# Patient Record
Sex: MALE | Race: WHITE | NOT HISPANIC OR LATINO | Employment: UNEMPLOYED | URBAN - METROPOLITAN AREA
[De-identification: names, ages, dates, MRNs, and addresses within clinical notes are randomized per-mention and may not be internally consistent; named-entity substitution may affect disease eponyms.]

---

## 2017-02-22 ENCOUNTER — COMMUNICATION - HEALTHEAST (OUTPATIENT)
Dept: FAMILY MEDICINE | Facility: CLINIC | Age: 57
End: 2017-02-22

## 2017-02-27 ENCOUNTER — OFFICE VISIT - HEALTHEAST (OUTPATIENT)
Dept: FAMILY MEDICINE | Facility: CLINIC | Age: 57
End: 2017-02-27

## 2017-02-27 ENCOUNTER — AMBULATORY - HEALTHEAST (OUTPATIENT)
Dept: FAMILY MEDICINE | Facility: CLINIC | Age: 57
End: 2017-02-27

## 2017-02-27 DIAGNOSIS — Z00.00 HEALTHCARE MAINTENANCE: ICD-10-CM

## 2017-02-27 DIAGNOSIS — R80.9 PROTEINURIA: ICD-10-CM

## 2017-02-27 DIAGNOSIS — N39.41 URGE INCONTINENCE OF URINE: ICD-10-CM

## 2017-02-27 DIAGNOSIS — E78.00 HYPERCHOLESTEROLEMIA: ICD-10-CM

## 2017-02-27 DIAGNOSIS — I10 ESSENTIAL HYPERTENSION WITH GOAL BLOOD PRESSURE LESS THAN 140/90: ICD-10-CM

## 2017-02-27 LAB
ALT SERPL W P-5'-P-CCNC: 18 U/L (ref 0–45)
CHOLEST SERPL-MCNC: 186 MG/DL
FASTING STATUS PATIENT QL REPORTED: NO
HDLC SERPL-MCNC: 38 MG/DL

## 2017-02-28 ENCOUNTER — COMMUNICATION - HEALTHEAST (OUTPATIENT)
Dept: FAMILY MEDICINE | Facility: CLINIC | Age: 57
End: 2017-02-28

## 2017-03-11 ENCOUNTER — COMMUNICATION - HEALTHEAST (OUTPATIENT)
Dept: FAMILY MEDICINE | Facility: CLINIC | Age: 57
End: 2017-03-11

## 2017-10-02 ENCOUNTER — COMMUNICATION - HEALTHEAST (OUTPATIENT)
Dept: FAMILY MEDICINE | Facility: CLINIC | Age: 57
End: 2017-10-02

## 2017-10-11 ENCOUNTER — OFFICE VISIT - HEALTHEAST (OUTPATIENT)
Dept: FAMILY MEDICINE | Facility: CLINIC | Age: 57
End: 2017-10-11

## 2017-10-11 DIAGNOSIS — K21.9 ESOPHAGEAL REFLUX: ICD-10-CM

## 2017-10-11 DIAGNOSIS — I10 ESSENTIAL HYPERTENSION, MALIGNANT: ICD-10-CM

## 2017-10-11 DIAGNOSIS — Z00.00 HEALTH CARE MAINTENANCE: ICD-10-CM

## 2017-10-11 DIAGNOSIS — R05.9 COUGH: ICD-10-CM

## 2017-10-11 DIAGNOSIS — I10 HTN (HYPERTENSION): ICD-10-CM

## 2017-10-11 RX ORDER — LORATADINE 10 MG/1
10 TABLET ORAL DAILY
Qty: 30 TABLET | Refills: 11 | Status: SHIPPED | OUTPATIENT
Start: 2017-10-11 | End: 2024-04-23

## 2017-10-11 ASSESSMENT — MIFFLIN-ST. JEOR: SCORE: 1482.22

## 2017-10-12 ENCOUNTER — COMMUNICATION - HEALTHEAST (OUTPATIENT)
Dept: FAMILY MEDICINE | Facility: CLINIC | Age: 57
End: 2017-10-12

## 2018-01-12 ENCOUNTER — COMMUNICATION - HEALTHEAST (OUTPATIENT)
Dept: FAMILY MEDICINE | Facility: CLINIC | Age: 58
End: 2018-01-12

## 2018-01-15 ENCOUNTER — OFFICE VISIT - HEALTHEAST (OUTPATIENT)
Dept: FAMILY MEDICINE | Facility: CLINIC | Age: 58
End: 2018-01-15

## 2018-01-15 ENCOUNTER — COMMUNICATION - HEALTHEAST (OUTPATIENT)
Dept: FAMILY MEDICINE | Facility: CLINIC | Age: 58
End: 2018-01-15

## 2018-01-15 ENCOUNTER — RECORDS - HEALTHEAST (OUTPATIENT)
Dept: GENERAL RADIOLOGY | Facility: CLINIC | Age: 58
End: 2018-01-15

## 2018-01-15 DIAGNOSIS — R05.9 COUGH: ICD-10-CM

## 2018-01-15 DIAGNOSIS — R11.0 NAUSEA: ICD-10-CM

## 2018-01-15 DIAGNOSIS — R80.9 PROTEINURIA: ICD-10-CM

## 2018-01-15 DIAGNOSIS — I10 ESSENTIAL HYPERTENSION: ICD-10-CM

## 2018-01-15 LAB
ALT SERPL W P-5'-P-CCNC: 16 U/L (ref 0–45)
ANION GAP SERPL CALCULATED.3IONS-SCNC: 13 MMOL/L (ref 5–18)
BUN SERPL-MCNC: 17 MG/DL (ref 8–22)
CALCIUM SERPL-MCNC: 9.3 MG/DL (ref 8.5–10.5)
CHLORIDE BLD-SCNC: 98 MMOL/L (ref 98–107)
CHOLEST SERPL-MCNC: 140 MG/DL
CO2 SERPL-SCNC: 30 MMOL/L (ref 22–31)
CREAT SERPL-MCNC: 0.97 MG/DL (ref 0.7–1.3)
FASTING STATUS PATIENT QL REPORTED: NO
GFR SERPL CREATININE-BSD FRML MDRD: >60 ML/MIN/1.73M2
GLUCOSE BLD-MCNC: 87 MG/DL (ref 70–125)
HDLC SERPL-MCNC: 33 MG/DL
LDLC SERPL CALC-MCNC: 85 MG/DL
POTASSIUM BLD-SCNC: 3.7 MMOL/L (ref 3.5–5)
SODIUM SERPL-SCNC: 141 MMOL/L (ref 136–145)
TRIGL SERPL-MCNC: 110 MG/DL

## 2018-01-15 ASSESSMENT — MIFFLIN-ST. JEOR: SCORE: 1477.68

## 2018-01-15 NOTE — ASSESSMENT & PLAN NOTE
Well-controlled, on lisinopril 20, hydrochlorothiazide 25, potassium.  Check basic metabolic profile.  Also urine protein.

## 2018-01-15 NOTE — ASSESSMENT & PLAN NOTE
Protein creatinine ratio 600 last year.  Need follow-up.  Unable to leave urine today.  On lisinopril

## 2018-01-17 ENCOUNTER — COMMUNICATION - HEALTHEAST (OUTPATIENT)
Dept: FAMILY MEDICINE | Facility: CLINIC | Age: 58
End: 2018-01-17

## 2018-01-19 ENCOUNTER — COMMUNICATION - HEALTHEAST (OUTPATIENT)
Dept: FAMILY MEDICINE | Facility: CLINIC | Age: 58
End: 2018-01-19

## 2018-01-20 ENCOUNTER — COMMUNICATION - HEALTHEAST (OUTPATIENT)
Dept: FAMILY MEDICINE | Facility: CLINIC | Age: 58
End: 2018-01-20

## 2018-01-22 ENCOUNTER — COMMUNICATION - HEALTHEAST (OUTPATIENT)
Dept: FAMILY MEDICINE | Facility: CLINIC | Age: 58
End: 2018-01-22

## 2018-01-23 ENCOUNTER — COMMUNICATION - HEALTHEAST (OUTPATIENT)
Dept: FAMILY MEDICINE | Facility: CLINIC | Age: 58
End: 2018-01-23

## 2018-01-24 ENCOUNTER — COMMUNICATION - HEALTHEAST (OUTPATIENT)
Dept: FAMILY MEDICINE | Facility: CLINIC | Age: 58
End: 2018-01-24

## 2018-01-25 ENCOUNTER — COMMUNICATION - HEALTHEAST (OUTPATIENT)
Dept: FAMILY MEDICINE | Facility: CLINIC | Age: 58
End: 2018-01-25

## 2018-01-26 ENCOUNTER — COMMUNICATION - HEALTHEAST (OUTPATIENT)
Dept: FAMILY MEDICINE | Facility: CLINIC | Age: 58
End: 2018-01-26

## 2018-01-27 ENCOUNTER — COMMUNICATION - HEALTHEAST (OUTPATIENT)
Dept: FAMILY MEDICINE | Facility: CLINIC | Age: 58
End: 2018-01-27

## 2018-01-30 ENCOUNTER — COMMUNICATION - HEALTHEAST (OUTPATIENT)
Dept: FAMILY MEDICINE | Facility: CLINIC | Age: 58
End: 2018-01-30

## 2018-01-31 ENCOUNTER — COMMUNICATION - HEALTHEAST (OUTPATIENT)
Dept: FAMILY MEDICINE | Facility: CLINIC | Age: 58
End: 2018-01-31

## 2018-02-08 ENCOUNTER — OFFICE VISIT - HEALTHEAST (OUTPATIENT)
Dept: FAMILY MEDICINE | Facility: CLINIC | Age: 58
End: 2018-02-08

## 2018-02-08 DIAGNOSIS — R80.9 PROTEINURIA: ICD-10-CM

## 2018-02-08 DIAGNOSIS — R23.8 PIMPLES: ICD-10-CM

## 2018-02-08 DIAGNOSIS — I10 ESSENTIAL HYPERTENSION: ICD-10-CM

## 2018-02-08 DIAGNOSIS — R51.9 HEADACHE: ICD-10-CM

## 2018-02-08 LAB
CREAT UR-MCNC: 166 MG/DL
MICROALBUMIN UR-MCNC: 56.85 MG/DL (ref 0–1.99)
MICROALBUMIN/CREAT UR: 342.5 MG/G

## 2018-02-08 ASSESSMENT — MIFFLIN-ST. JEOR: SCORE: 1477.68

## 2018-02-08 NOTE — ASSESSMENT & PLAN NOTE
Hypertension is poorly controlled today but as below has been good coming into this and good at home  BP Readings from Last 3 Encounters:   02/08/18 142/82   01/15/18 108/62   10/11/17 136/70     Comment: We will continue to follow  Current antihypertensives lisinopril 20 mg, hydrochlorothiazide 25  Plan: Follow-up 3 months, continue current medication

## 2018-02-09 ENCOUNTER — COMMUNICATION - HEALTHEAST (OUTPATIENT)
Dept: FAMILY MEDICINE | Facility: CLINIC | Age: 58
End: 2018-02-09

## 2018-02-10 ENCOUNTER — COMMUNICATION - HEALTHEAST (OUTPATIENT)
Dept: FAMILY MEDICINE | Facility: CLINIC | Age: 58
End: 2018-02-10

## 2018-02-12 ENCOUNTER — COMMUNICATION - HEALTHEAST (OUTPATIENT)
Dept: FAMILY MEDICINE | Facility: CLINIC | Age: 58
End: 2018-02-12

## 2018-02-14 ENCOUNTER — COMMUNICATION - HEALTHEAST (OUTPATIENT)
Dept: FAMILY MEDICINE | Facility: CLINIC | Age: 58
End: 2018-02-14

## 2018-02-18 ENCOUNTER — COMMUNICATION - HEALTHEAST (OUTPATIENT)
Dept: FAMILY MEDICINE | Facility: CLINIC | Age: 58
End: 2018-02-18

## 2018-02-20 ENCOUNTER — COMMUNICATION - HEALTHEAST (OUTPATIENT)
Dept: FAMILY MEDICINE | Facility: CLINIC | Age: 58
End: 2018-02-20

## 2018-02-26 ENCOUNTER — COMMUNICATION - HEALTHEAST (OUTPATIENT)
Dept: FAMILY MEDICINE | Facility: CLINIC | Age: 58
End: 2018-02-26

## 2018-03-06 ENCOUNTER — COMMUNICATION - HEALTHEAST (OUTPATIENT)
Dept: FAMILY MEDICINE | Facility: CLINIC | Age: 58
End: 2018-03-06

## 2018-03-07 ENCOUNTER — OFFICE VISIT - HEALTHEAST (OUTPATIENT)
Dept: FAMILY MEDICINE | Facility: CLINIC | Age: 58
End: 2018-03-07

## 2018-03-07 DIAGNOSIS — R05.9 COUGH: ICD-10-CM

## 2018-03-07 DIAGNOSIS — I10 ESSENTIAL HYPERTENSION: ICD-10-CM

## 2018-03-07 DIAGNOSIS — R80.9 PROTEINURIA: ICD-10-CM

## 2018-03-07 DIAGNOSIS — R51.9 HEADACHE: ICD-10-CM

## 2018-03-07 NOTE — ASSESSMENT & PLAN NOTE
9 weeks, at times severe, no clear pattern though most symptoms consistent with muscle tension headache, no red flags.  Patient with history of cerebral palsy.    Given duration, describes severity, will obtain MRI.  If negative, referral for physical therapy for tension headache/cervicalgia

## 2018-03-07 NOTE — ASSESSMENT & PLAN NOTE
Well-controlled but has developed a subacute cough, will stop lisinopril, start losartan, follow blood pressures.

## 2018-03-07 NOTE — ASSESSMENT & PLAN NOTE
Subacute/chronic at this point  No clear cause, began without upper respiratory infection  Normal chest x-ray, some wheezing with coughing  Not respond to treatment with omeprazole, prednisone, cephalexin (used for another cause and )  No symptoms GERD, no upper respiratory symptoms whatsoever  We will stop lisinopril despite the fact that he took it for a long time without any significant cough.  Obtain PFTs looking for obstructive lung disease/asthma  Follow-up with these results

## 2018-03-09 ENCOUNTER — COMMUNICATION - HEALTHEAST (OUTPATIENT)
Dept: FAMILY MEDICINE | Facility: CLINIC | Age: 58
End: 2018-03-09

## 2018-03-12 ENCOUNTER — COMMUNICATION - HEALTHEAST (OUTPATIENT)
Dept: FAMILY MEDICINE | Facility: CLINIC | Age: 58
End: 2018-03-12

## 2018-03-13 ENCOUNTER — COMMUNICATION - HEALTHEAST (OUTPATIENT)
Dept: FAMILY MEDICINE | Facility: CLINIC | Age: 58
End: 2018-03-13

## 2018-03-13 ENCOUNTER — HOSPITAL ENCOUNTER (OUTPATIENT)
Dept: MRI IMAGING | Facility: CLINIC | Age: 58
Discharge: HOME | End: 2018-03-13
Attending: FAMILY MEDICINE

## 2018-03-13 DIAGNOSIS — R51.9 HEADACHE: ICD-10-CM

## 2018-03-13 LAB
CREAT BLD-MCNC: 1 MG/DL
CREAT BLD-MCNC: 1 MG/DL (ref 0.7–1.3)
POC GFR AMER AF HE - HISTORICAL: >60 ML/MIN/1.73M2
POC GFR NON AMER AF HE - HISTORICAL: >60 ML/MIN/1.73M2

## 2018-03-14 ENCOUNTER — AMBULATORY - HEALTHEAST (OUTPATIENT)
Dept: FAMILY MEDICINE | Facility: CLINIC | Age: 58
End: 2018-03-14

## 2018-03-14 ENCOUNTER — COMMUNICATION - HEALTHEAST (OUTPATIENT)
Dept: FAMILY MEDICINE | Facility: CLINIC | Age: 58
End: 2018-03-14

## 2018-03-14 DIAGNOSIS — G44.209 MUSCLE TENSION HEADACHE: ICD-10-CM

## 2018-03-23 ENCOUNTER — OFFICE VISIT - HEALTHEAST (OUTPATIENT)
Dept: PHYSICAL THERAPY | Facility: REHABILITATION | Age: 58
End: 2018-03-23

## 2018-03-23 DIAGNOSIS — R29.3 ABNORMAL POSTURE: ICD-10-CM

## 2018-03-23 DIAGNOSIS — G44.209 TENSION HEADACHE: ICD-10-CM

## 2018-03-23 DIAGNOSIS — R29.898 DECREASED ROM OF NECK: ICD-10-CM

## 2018-03-27 ENCOUNTER — OFFICE VISIT - HEALTHEAST (OUTPATIENT)
Dept: PHYSICAL THERAPY | Facility: REHABILITATION | Age: 58
End: 2018-03-27

## 2018-03-27 DIAGNOSIS — R29.3 ABNORMAL POSTURE: ICD-10-CM

## 2018-03-27 DIAGNOSIS — G44.209 TENSION HEADACHE: ICD-10-CM

## 2018-03-27 DIAGNOSIS — R29.898 DECREASED ROM OF NECK: ICD-10-CM

## 2018-03-30 ENCOUNTER — OFFICE VISIT - HEALTHEAST (OUTPATIENT)
Dept: PHYSICAL THERAPY | Facility: REHABILITATION | Age: 58
End: 2018-03-30

## 2018-03-30 ENCOUNTER — COMMUNICATION - HEALTHEAST (OUTPATIENT)
Dept: FAMILY MEDICINE | Facility: CLINIC | Age: 58
End: 2018-03-30

## 2018-03-30 DIAGNOSIS — R29.3 ABNORMAL POSTURE: ICD-10-CM

## 2018-03-30 DIAGNOSIS — R29.898 DECREASED ROM OF NECK: ICD-10-CM

## 2018-03-30 DIAGNOSIS — G44.209 TENSION HEADACHE: ICD-10-CM

## 2018-04-17 ENCOUNTER — OFFICE VISIT - HEALTHEAST (OUTPATIENT)
Dept: PHYSICAL THERAPY | Facility: REHABILITATION | Age: 58
End: 2018-04-17

## 2018-04-17 DIAGNOSIS — R29.898 DECREASED ROM OF NECK: ICD-10-CM

## 2018-04-17 DIAGNOSIS — G44.209 TENSION HEADACHE: ICD-10-CM

## 2018-04-17 DIAGNOSIS — R29.3 ABNORMAL POSTURE: ICD-10-CM

## 2018-08-05 ENCOUNTER — COMMUNICATION - HEALTHEAST (OUTPATIENT)
Dept: FAMILY MEDICINE | Facility: CLINIC | Age: 58
End: 2018-08-05

## 2018-08-13 ENCOUNTER — COMMUNICATION - HEALTHEAST (OUTPATIENT)
Dept: FAMILY MEDICINE | Facility: CLINIC | Age: 58
End: 2018-08-13

## 2018-08-15 ENCOUNTER — OFFICE VISIT - HEALTHEAST (OUTPATIENT)
Dept: FAMILY MEDICINE | Facility: CLINIC | Age: 58
End: 2018-08-15

## 2018-08-15 ENCOUNTER — COMMUNICATION - HEALTHEAST (OUTPATIENT)
Dept: FAMILY MEDICINE | Facility: CLINIC | Age: 58
End: 2018-08-15

## 2018-08-15 DIAGNOSIS — N30.00 ACUTE CYSTITIS WITHOUT HEMATURIA: ICD-10-CM

## 2018-08-15 DIAGNOSIS — I10 ESSENTIAL HYPERTENSION: ICD-10-CM

## 2018-08-15 DIAGNOSIS — R35.0 URINARY FREQUENCY: ICD-10-CM

## 2018-08-15 LAB
ALBUMIN UR-MCNC: ABNORMAL MG/DL
APPEARANCE UR: ABNORMAL
BILIRUB UR QL STRIP: NEGATIVE
COLOR UR AUTO: YELLOW
GLUCOSE UR STRIP-MCNC: NEGATIVE MG/DL
HGB UR QL STRIP: ABNORMAL
KETONES UR STRIP-MCNC: NEGATIVE MG/DL
LEUKOCYTE ESTERASE UR QL STRIP: ABNORMAL
NITRATE UR QL: POSITIVE
PH UR STRIP: 5.5 [PH] (ref 5–8)
SP GR UR STRIP: 1.02 (ref 1–1.03)
UROBILINOGEN UR STRIP-ACNC: ABNORMAL

## 2018-08-15 NOTE — ASSESSMENT & PLAN NOTE
Ports condition pre-existing before recent UTI.  Asked him to follow-up when he comes in for hypertension, discussion regarding possible treatment of BPH.

## 2018-08-15 NOTE — ASSESSMENT & PLAN NOTE
, Unclear whether he is taking losartan in addition to hydrochlorothiazide.  Asked him to check on this, also, check blood pressures a few times a week over the next 3-4 weeks and follow-up withhis machine in hand

## 2018-08-17 LAB — BACTERIA SPEC CULT: ABNORMAL

## 2018-11-06 ENCOUNTER — COMMUNICATION - HEALTHEAST (OUTPATIENT)
Dept: FAMILY MEDICINE | Facility: CLINIC | Age: 58
End: 2018-11-06

## 2019-01-17 ENCOUNTER — COMMUNICATION - HEALTHEAST (OUTPATIENT)
Dept: FAMILY MEDICINE | Facility: CLINIC | Age: 59
End: 2019-01-17

## 2019-05-29 ENCOUNTER — COMMUNICATION - HEALTHEAST (OUTPATIENT)
Dept: FAMILY MEDICINE | Facility: CLINIC | Age: 59
End: 2019-05-29

## 2019-06-18 ENCOUNTER — OFFICE VISIT - HEALTHEAST (OUTPATIENT)
Dept: FAMILY MEDICINE | Facility: CLINIC | Age: 59
End: 2019-06-18

## 2019-06-18 ENCOUNTER — COMMUNICATION - HEALTHEAST (OUTPATIENT)
Dept: FAMILY MEDICINE | Facility: CLINIC | Age: 59
End: 2019-06-18

## 2019-06-18 DIAGNOSIS — N40.1 BENIGN PROSTATIC HYPERPLASIA WITH LOWER URINARY TRACT SYMPTOMS, SYMPTOM DETAILS UNSPECIFIED: ICD-10-CM

## 2019-06-18 DIAGNOSIS — I10 ESSENTIAL HYPERTENSION: ICD-10-CM

## 2019-06-18 DIAGNOSIS — I51.7 CARDIOMEGALY: ICD-10-CM

## 2019-06-18 DIAGNOSIS — R80.9 PROTEINURIA, UNSPECIFIED TYPE: ICD-10-CM

## 2019-06-18 DIAGNOSIS — E78.00 HYPERCHOLESTEROLEMIA: ICD-10-CM

## 2019-06-18 DIAGNOSIS — Z00.00 HEALTHCARE MAINTENANCE: ICD-10-CM

## 2019-06-18 LAB
ALBUMIN UR-MCNC: ABNORMAL MG/DL
ALT SERPL W P-5'-P-CCNC: 14 U/L (ref 0–45)
ANION GAP SERPL CALCULATED.3IONS-SCNC: 7 MMOL/L (ref 5–18)
APPEARANCE UR: CLEAR
BACTERIA #/AREA URNS HPF: ABNORMAL HPF
BILIRUB UR QL STRIP: NEGATIVE
BUN SERPL-MCNC: 20 MG/DL (ref 8–22)
CALCIUM SERPL-MCNC: 9.8 MG/DL (ref 8.5–10.5)
CHLORIDE BLD-SCNC: 105 MMOL/L (ref 98–107)
CHOLEST SERPL-MCNC: 210 MG/DL
CO2 SERPL-SCNC: 31 MMOL/L (ref 22–31)
COLOR UR AUTO: YELLOW
CREAT SERPL-MCNC: 1.01 MG/DL (ref 0.7–1.3)
CREAT UR-MCNC: 165.5 MG/DL
FASTING STATUS PATIENT QL REPORTED: YES
GFR SERPL CREATININE-BSD FRML MDRD: >60 ML/MIN/1.73M2
GLUCOSE BLD-MCNC: 77 MG/DL (ref 70–125)
GLUCOSE UR STRIP-MCNC: NEGATIVE MG/DL
HDLC SERPL-MCNC: 41 MG/DL
HGB UR QL STRIP: ABNORMAL
KETONES UR STRIP-MCNC: NEGATIVE MG/DL
LEUKOCYTE ESTERASE UR QL STRIP: NEGATIVE
MICROALBUMIN UR-MCNC: 61.76 MG/DL (ref 0–1.99)
MICROALBUMIN/CREAT UR: 373.2 MG/G
MUCOUS THREADS #/AREA URNS LPF: ABNORMAL LPF
NITRATE UR QL: NEGATIVE
PH UR STRIP: 5.5 [PH] (ref 5–8)
POTASSIUM BLD-SCNC: 3.7 MMOL/L (ref 3.5–5)
RBC #/AREA URNS AUTO: ABNORMAL HPF
SODIUM SERPL-SCNC: 143 MMOL/L (ref 136–145)
SP GR UR STRIP: >=1.03 (ref 1–1.03)
SQUAMOUS #/AREA URNS AUTO: ABNORMAL LPF
UROBILINOGEN UR STRIP-ACNC: ABNORMAL
WBC #/AREA URNS AUTO: ABNORMAL HPF

## 2019-06-18 ASSESSMENT — MIFFLIN-ST. JEOR: SCORE: 1451.03

## 2019-06-18 NOTE — ASSESSMENT & PLAN NOTE
Symptoms of urinary frequency, and urgency moderate to severe AUA, enlarged prostate.  Check UA, start tamsulosin, follow-up 1 month

## 2019-06-18 NOTE — ASSESSMENT & PLAN NOTE
Patient had stopped all medications.  Restart losartan 50, hold on restarting hydrochlorothiazide because of urinary frequency issues.Base metabolic profile and microalbumin ordered, history of mild proteinuria.  Follow-up 1 month

## 2019-06-19 ENCOUNTER — AMBULATORY - HEALTHEAST (OUTPATIENT)
Dept: FAMILY MEDICINE | Facility: CLINIC | Age: 59
End: 2019-06-19

## 2019-06-19 ENCOUNTER — COMMUNICATION - HEALTHEAST (OUTPATIENT)
Dept: FAMILY MEDICINE | Facility: CLINIC | Age: 59
End: 2019-06-19

## 2019-06-19 DIAGNOSIS — E78.00 HYPERCHOLESTEROLEMIA: ICD-10-CM

## 2019-06-20 ENCOUNTER — COMMUNICATION - HEALTHEAST (OUTPATIENT)
Dept: FAMILY MEDICINE | Facility: CLINIC | Age: 59
End: 2019-06-20

## 2019-07-18 ENCOUNTER — OFFICE VISIT - HEALTHEAST (OUTPATIENT)
Dept: FAMILY MEDICINE | Facility: CLINIC | Age: 59
End: 2019-07-18

## 2019-07-18 DIAGNOSIS — Z11.4 ENCOUNTER FOR SCREENING FOR HIV: ICD-10-CM

## 2019-07-18 DIAGNOSIS — I51.7 CARDIOMEGALY: ICD-10-CM

## 2019-07-18 DIAGNOSIS — R53.1 WEAKNESS: ICD-10-CM

## 2019-07-18 DIAGNOSIS — I10 ESSENTIAL HYPERTENSION: ICD-10-CM

## 2019-07-18 DIAGNOSIS — R80.9 PROTEINURIA, UNSPECIFIED TYPE: ICD-10-CM

## 2019-07-18 DIAGNOSIS — R40.0 DAYTIME SLEEPINESS: ICD-10-CM

## 2019-07-18 DIAGNOSIS — N40.1 BENIGN PROSTATIC HYPERPLASIA WITH LOWER URINARY TRACT SYMPTOMS, SYMPTOM DETAILS UNSPECIFIED: ICD-10-CM

## 2019-07-18 LAB
ALBUMIN SERPL-MCNC: 3.8 G/DL (ref 3.5–5)
ALP SERPL-CCNC: 71 U/L (ref 45–120)
ALT SERPL W P-5'-P-CCNC: 12 U/L (ref 0–45)
ANION GAP SERPL CALCULATED.3IONS-SCNC: 7 MMOL/L (ref 5–18)
AST SERPL W P-5'-P-CCNC: 13 U/L (ref 0–40)
BILIRUB SERPL-MCNC: 0.7 MG/DL (ref 0–1)
BUN SERPL-MCNC: 25 MG/DL (ref 8–22)
CALCIUM SERPL-MCNC: 9.6 MG/DL (ref 8.5–10.5)
CHLORIDE BLD-SCNC: 106 MMOL/L (ref 98–107)
CO2 SERPL-SCNC: 28 MMOL/L (ref 22–31)
CREAT SERPL-MCNC: 0.94 MG/DL (ref 0.7–1.3)
ERYTHROCYTE [SEDIMENTATION RATE] IN BLOOD BY WESTERGREN METHOD: 2 MM/HR (ref 0–15)
GFR SERPL CREATININE-BSD FRML MDRD: >60 ML/MIN/1.73M2
GLUCOSE BLD-MCNC: 94 MG/DL (ref 70–125)
HIV 1+2 AB+HIV1 P24 AG SERPL QL IA: NEGATIVE
POTASSIUM BLD-SCNC: 4.3 MMOL/L (ref 3.5–5)
PROT SERPL-MCNC: 7.1 G/DL (ref 6–8)
SODIUM SERPL-SCNC: 141 MMOL/L (ref 136–145)
URATE SERPL-MCNC: 5.3 MG/DL (ref 3–8)

## 2019-07-18 NOTE — ASSESSMENT & PLAN NOTE
Modest improvement in symptoms with tamsulosin but patient is complaining of daytime sleepiness and fairly convinced that is medication related.  Will hold this for 2 weeks, restart if it does not seem related to the sleepiness

## 2019-07-18 NOTE — ASSESSMENT & PLAN NOTE
Hypertension is well controlled;    BP Readings from Last 3 Encounters:   07/18/19 138/82   06/25/19 134/61   06/18/19 (!) 162/100       Current antihypertensives losartan 50 mg  Patient has long-standing proteinuria, on the order of 300 mg/day semiquantitative estimate  Plan: No change in blood pressure medicine, work-up proteinuria    for orders placed or performed during the hospital encounter of 06/25/19   Basic Metabolic Panel   Result Value Ref Range    Sodium 141 136 - 145 mmol/L    Potassium 3.4 (L) 3.5 -5.0 mmol/L    Chloride 104 98 - 107 mmol/L    CO2 23 22 - 31 mmol/L    Anion Gap, Calculation 14 5 - 18 mmol/L    Glucose 199 (H) 70 - 125 mg/dL    Calcium 9.4 8.5 - 10.5 mg/dL    BUN 16 8 - 22 mg/dL    Creatinine 1.37 (H) 0.70 - 1.30mg/dL    GFR MDRD Af Amer >60 >60 mL/min/1.73m2    GFR MDRD Non Af Amer 53 (L) >60 mL/min/1.73m2

## 2019-07-18 NOTE — ASSESSMENT & PLAN NOTE
Long-standing.  Reviewed chart and it appears that the first inkling of some proteinuria occurred on urinalysis in 2015.  Had a high level in 2017 and this is gone down somewhat.  Now on the order of 300 mg/dL; to take it if.  Has not been worked up.  Given LVH, suspect this may be related to long-standing untreated hypertension from the past but no proof of that.    Will do serology work-up, check 24-hour urine protein, hold all ibuprofen, reassess, consider referral to nephrology

## 2019-07-18 NOTE — ASSESSMENT & PLAN NOTE
Recently started 3 medications which he believes are the culprit.  Losartan is unlikely to be a problem, atorvastatin unlikely but possible, tamsulosin possible.  Will hold the last 2 for 2 weeks, see if symptoms improve.  If not improving, consider sleep study if patient does not sleep with anybody and is not sure if he snores

## 2019-07-19 ENCOUNTER — COMMUNICATION - HEALTHEAST (OUTPATIENT)
Dept: FAMILY MEDICINE | Facility: CLINIC | Age: 59
End: 2019-07-19

## 2019-07-19 DIAGNOSIS — R80.9 PROTEINURIA, UNSPECIFIED TYPE: ICD-10-CM

## 2019-07-19 LAB
ALBUMIN PERCENT: 55.4 % (ref 51–67)
ALBUMIN SERPL ELPH-MCNC: 3.8 G/DL (ref 3.2–4.7)
ALPHA 1 PERCENT: 3 % (ref 2–4)
ALPHA 2 PERCENT: 10.8 % (ref 5–13)
ALPHA1 GLOB SERPL ELPH-MCNC: 0.2 G/DL (ref 0.1–0.3)
ALPHA2 GLOB SERPL ELPH-MCNC: 0.7 G/DL (ref 0.4–0.9)
B-GLOBULIN SERPL ELPH-MCNC: 0.7 G/DL (ref 0.7–1.2)
BETA PERCENT: 10.1 % (ref 10–17)
GAMMA GLOB SERPL ELPH-MCNC: 1.4 G/DL (ref 0.6–1.4)
GAMMA GLOBULIN PERCENT: 20.7 % (ref 9–20)
HAV IGM SERPL QL IA: NEGATIVE
HBV CORE IGM SERPL QL IA: NEGATIVE
HBV SURFACE AG SERPL QL IA: NEGATIVE
HCV AB SERPL QL IA: NEGATIVE
PATH ICD:: ABNORMAL
PROT PATTERN SERPL ELPH-IMP: ABNORMAL
PROT SERPL-MCNC: 6.9 G/DL (ref 6–8)
REVIEWING PATHOLOGIST: ABNORMAL
T PALLIDUM AB SER QL: NEGATIVE

## 2019-08-15 ENCOUNTER — OFFICE VISIT - HEALTHEAST (OUTPATIENT)
Dept: FAMILY MEDICINE | Facility: CLINIC | Age: 59
End: 2019-08-15

## 2019-08-15 DIAGNOSIS — I10 ESSENTIAL HYPERTENSION: ICD-10-CM

## 2019-08-15 DIAGNOSIS — N40.1 BENIGN PROSTATIC HYPERPLASIA WITH LOWER URINARY TRACT SYMPTOMS, SYMPTOM DETAILS UNSPECIFIED: ICD-10-CM

## 2019-08-15 DIAGNOSIS — E78.00 HYPERCHOLESTEROLEMIA: ICD-10-CM

## 2019-08-15 DIAGNOSIS — I51.7 CARDIOMEGALY: ICD-10-CM

## 2019-08-15 LAB
PROTEIN TOTAL TIMED URINE MG/SPEC LHE: 306 MG/24HR (ref 0–150)
PROTEIN, RANDOM URINE - HISTORICAL: 18 MG/DL
SPECIMEN VOL UR: 1700 ML

## 2019-09-05 ENCOUNTER — AMBULATORY - HEALTHEAST (OUTPATIENT)
Dept: FAMILY MEDICINE | Facility: CLINIC | Age: 59
End: 2019-09-05

## 2019-09-05 ENCOUNTER — AMBULATORY - HEALTHEAST (OUTPATIENT)
Dept: NURSING | Facility: CLINIC | Age: 59
End: 2019-09-05

## 2019-09-05 ENCOUNTER — COMMUNICATION - HEALTHEAST (OUTPATIENT)
Dept: FAMILY MEDICINE | Facility: CLINIC | Age: 59
End: 2019-09-05

## 2019-09-05 DIAGNOSIS — E78.00 HYPERCHOLESTEROLEMIA: ICD-10-CM

## 2019-11-15 ENCOUNTER — COMMUNICATION - HEALTHEAST (OUTPATIENT)
Dept: FAMILY MEDICINE | Facility: CLINIC | Age: 59
End: 2019-11-15

## 2019-11-26 ENCOUNTER — COMMUNICATION - HEALTHEAST (OUTPATIENT)
Dept: FAMILY MEDICINE | Facility: CLINIC | Age: 59
End: 2019-11-26

## 2020-02-22 ENCOUNTER — COMMUNICATION - HEALTHEAST (OUTPATIENT)
Dept: FAMILY MEDICINE | Facility: CLINIC | Age: 60
End: 2020-02-22

## 2020-04-21 ENCOUNTER — COMMUNICATION - HEALTHEAST (OUTPATIENT)
Dept: FAMILY MEDICINE | Facility: CLINIC | Age: 60
End: 2020-04-21

## 2020-05-08 ENCOUNTER — COMMUNICATION - HEALTHEAST (OUTPATIENT)
Dept: FAMILY MEDICINE | Facility: CLINIC | Age: 60
End: 2020-05-08

## 2020-05-18 ENCOUNTER — AMBULATORY - HEALTHEAST (OUTPATIENT)
Dept: FAMILY MEDICINE | Facility: CLINIC | Age: 60
End: 2020-05-18

## 2020-05-18 DIAGNOSIS — R05.9 COUGH: ICD-10-CM

## 2020-05-19 ENCOUNTER — COMMUNICATION - HEALTHEAST (OUTPATIENT)
Dept: FAMILY MEDICINE | Facility: CLINIC | Age: 60
End: 2020-05-19

## 2020-05-20 ENCOUNTER — OFFICE VISIT - HEALTHEAST (OUTPATIENT)
Dept: INTERNAL MEDICINE | Facility: CLINIC | Age: 60
End: 2020-05-20

## 2020-05-20 DIAGNOSIS — R05.9 COUGH: ICD-10-CM

## 2020-05-21 ENCOUNTER — COMMUNICATION - HEALTHEAST (OUTPATIENT)
Dept: INTERNAL MEDICINE | Facility: CLINIC | Age: 60
End: 2020-05-21

## 2020-05-23 ENCOUNTER — COMMUNICATION - HEALTHEAST (OUTPATIENT)
Dept: FAMILY MEDICINE | Facility: CLINIC | Age: 60
End: 2020-05-23

## 2020-07-03 ENCOUNTER — COMMUNICATION - HEALTHEAST (OUTPATIENT)
Dept: FAMILY MEDICINE | Facility: CLINIC | Age: 60
End: 2020-07-03

## 2020-07-07 ENCOUNTER — OFFICE VISIT - HEALTHEAST (OUTPATIENT)
Dept: FAMILY MEDICINE | Facility: CLINIC | Age: 60
End: 2020-07-07

## 2020-07-07 DIAGNOSIS — N18.30 CHRONIC KIDNEY DISEASE, STAGE 3 (MODERATE): ICD-10-CM

## 2020-07-07 DIAGNOSIS — M16.11 OSTEOARTHRITIS OF RIGHT HIP, UNSPECIFIED OSTEOARTHRITIS TYPE: ICD-10-CM

## 2020-07-07 DIAGNOSIS — I10 ESSENTIAL HYPERTENSION: ICD-10-CM

## 2020-07-07 DIAGNOSIS — R73.9 HYPERGLYCEMIA: ICD-10-CM

## 2020-07-07 DIAGNOSIS — E87.6 HYPOKALEMIA: ICD-10-CM

## 2020-07-07 DIAGNOSIS — R79.89 ELEVATED SERUM CREATININE: ICD-10-CM

## 2020-07-07 ASSESSMENT — PATIENT HEALTH QUESTIONNAIRE - PHQ9: SUM OF ALL RESPONSES TO PHQ QUESTIONS 1-9: 0

## 2020-07-08 ENCOUNTER — COMMUNICATION - HEALTHEAST (OUTPATIENT)
Dept: FAMILY MEDICINE | Facility: CLINIC | Age: 60
End: 2020-07-08

## 2020-07-22 ENCOUNTER — COMMUNICATION - HEALTHEAST (OUTPATIENT)
Dept: FAMILY MEDICINE | Facility: CLINIC | Age: 60
End: 2020-07-22

## 2020-07-22 DIAGNOSIS — N40.1 BENIGN PROSTATIC HYPERPLASIA WITH LOWER URINARY TRACT SYMPTOMS, SYMPTOM DETAILS UNSPECIFIED: ICD-10-CM

## 2020-07-23 ENCOUNTER — AMBULATORY - HEALTHEAST (OUTPATIENT)
Dept: FAMILY MEDICINE | Facility: CLINIC | Age: 60
End: 2020-07-23

## 2020-07-23 DIAGNOSIS — I10 ESSENTIAL HYPERTENSION, MALIGNANT: ICD-10-CM

## 2020-07-23 RX ORDER — TAMSULOSIN HYDROCHLORIDE 0.4 MG/1
CAPSULE ORAL
Qty: 90 CAPSULE | Refills: 3 | Status: SHIPPED | OUTPATIENT
Start: 2020-07-23 | End: 2021-10-04

## 2020-08-28 ENCOUNTER — COMMUNICATION - HEALTHEAST (OUTPATIENT)
Dept: FAMILY MEDICINE | Facility: CLINIC | Age: 60
End: 2020-08-28

## 2020-08-28 DIAGNOSIS — E78.00 HYPERCHOLESTEROLEMIA: ICD-10-CM

## 2020-08-28 RX ORDER — ATORVASTATIN CALCIUM 20 MG/1
20 TABLET, FILM COATED ORAL AT BEDTIME
Qty: 90 TABLET | Refills: 3 | Status: SHIPPED | OUTPATIENT
Start: 2020-08-28 | End: 2024-03-03

## 2020-12-13 ENCOUNTER — COMMUNICATION - HEALTHEAST (OUTPATIENT)
Dept: FAMILY MEDICINE | Facility: CLINIC | Age: 60
End: 2020-12-13

## 2020-12-30 ENCOUNTER — COMMUNICATION - HEALTHEAST (OUTPATIENT)
Dept: FAMILY MEDICINE | Facility: CLINIC | Age: 60
End: 2020-12-30

## 2020-12-30 ENCOUNTER — OFFICE VISIT - HEALTHEAST (OUTPATIENT)
Dept: FAMILY MEDICINE | Facility: CLINIC | Age: 60
End: 2020-12-30

## 2020-12-30 DIAGNOSIS — R60.0 LOWER EXTREMITY EDEMA: ICD-10-CM

## 2020-12-30 DIAGNOSIS — M54.50 ACUTE BILATERAL LOW BACK PAIN WITHOUT SCIATICA: ICD-10-CM

## 2020-12-30 DIAGNOSIS — I10 ESSENTIAL HYPERTENSION: ICD-10-CM

## 2020-12-30 DIAGNOSIS — R59.0 CERVICAL LYMPHADENOPATHY: ICD-10-CM

## 2020-12-30 DIAGNOSIS — N18.31 STAGE 3A CHRONIC KIDNEY DISEASE (H): ICD-10-CM

## 2020-12-30 DIAGNOSIS — R73.9 HYPERGLYCEMIA: ICD-10-CM

## 2020-12-30 DIAGNOSIS — E78.00 HYPERCHOLESTEROLEMIA: ICD-10-CM

## 2020-12-30 DIAGNOSIS — R80.9 PROTEINURIA, UNSPECIFIED TYPE: ICD-10-CM

## 2020-12-30 LAB
ANION GAP SERPL CALCULATED.3IONS-SCNC: 11 MMOL/L (ref 5–18)
BUN SERPL-MCNC: 24 MG/DL (ref 8–22)
CALCIUM SERPL-MCNC: 9 MG/DL (ref 8.5–10.5)
CHLORIDE BLD-SCNC: 105 MMOL/L (ref 98–107)
CO2 SERPL-SCNC: 27 MMOL/L (ref 22–31)
CREAT SERPL-MCNC: 0.99 MG/DL (ref 0.7–1.3)
GFR SERPL CREATININE-BSD FRML MDRD: >60 ML/MIN/1.73M2
GLUCOSE BLD-MCNC: 121 MG/DL (ref 70–125)
HBA1C MFR BLD: 5.4 %
HGB BLD-MCNC: 15.2 G/DL (ref 14–18)
LDLC SERPL CALC-MCNC: 139 MG/DL
POTASSIUM BLD-SCNC: 4.2 MMOL/L (ref 3.5–5)
SODIUM SERPL-SCNC: 143 MMOL/L (ref 136–145)

## 2020-12-30 RX ORDER — CYCLOBENZAPRINE HCL 5 MG
5 TABLET ORAL 3 TIMES DAILY PRN
Qty: 30 TABLET | Refills: 3 | Status: SHIPPED | OUTPATIENT
Start: 2020-12-30 | End: 2023-12-05

## 2020-12-30 ASSESSMENT — MIFFLIN-ST. JEOR: SCORE: 1489.02

## 2020-12-30 NOTE — ASSESSMENT & PLAN NOTE
Recheck UA/microalbumin upon follow-up.  Possibly related to swelling.  Increase losartan dose to treat blood pressure

## 2020-12-30 NOTE — ASSESSMENT & PLAN NOTE
BMP, hemoglobin ordered, vitamin D check rejected/not covered even for chronic kidney disease.  Willforego that for now.  Losartan.  Check microalbumin upon follow-up

## 2020-12-30 NOTE — ASSESSMENT & PLAN NOTE
No red flags but patient exquisitely tenderness with a lot of spasm.  Had plan to do x-ray today but x-ray closed by the time this wasdone.  Consider upon follow-up for hypertension.  Tylenol, physical therapy, muscle relaxer.

## 2020-12-30 NOTE — ASSESSMENT & PLAN NOTE
Mobile, smooth, less than 1 cm, not pathologic, 3 weeks duration.  Follow 3-4 more weeks for resolution.  Work-up if not resolving upon follow-up

## 2020-12-30 NOTE — ASSESSMENT & PLAN NOTE
Etiology unclear, check BMP.  Recommend recheck urine but unable to urinate.  I do not want to add a diuretic at this point because ofhistory of hypokalemia.  Will follow, treat elevated blood pressure, follow-up 3 weeks for elevated blood pressure, check UA at that time patient does have a history of albuminuria

## 2020-12-31 ENCOUNTER — COMMUNICATION - HEALTHEAST (OUTPATIENT)
Dept: FAMILY MEDICINE | Facility: CLINIC | Age: 60
End: 2020-12-31

## 2021-01-18 ENCOUNTER — COMMUNICATION - HEALTHEAST (OUTPATIENT)
Dept: FAMILY MEDICINE | Facility: CLINIC | Age: 61
End: 2021-01-18

## 2021-01-27 ENCOUNTER — OFFICE VISIT - HEALTHEAST (OUTPATIENT)
Dept: FAMILY MEDICINE | Facility: CLINIC | Age: 61
End: 2021-01-27

## 2021-01-27 ENCOUNTER — COMMUNICATION - HEALTHEAST (OUTPATIENT)
Dept: FAMILY MEDICINE | Facility: CLINIC | Age: 61
End: 2021-01-27

## 2021-01-27 DIAGNOSIS — I10 ESSENTIAL HYPERTENSION: ICD-10-CM

## 2021-01-27 DIAGNOSIS — M54.50 ACUTE BILATERAL LOW BACK PAIN WITHOUT SCIATICA: ICD-10-CM

## 2021-01-27 NOTE — ASSESSMENT & PLAN NOTE
Blood pressure remains high.  Losartan 100 mg, dose increased from 50 mg.  We will add metoprolol XL 50 mg, follow-up 1 month

## 2021-01-27 NOTE — ASSESSMENT & PLAN NOTE
Has not scheduled for physical therapy yet, continues to have significant pain, difficulty getting up out of a chair.  Pledges to schedule for physical therapy.

## 2021-01-29 RX ORDER — METOPROLOL SUCCINATE 50 MG/1
TABLET, EXTENDED RELEASE ORAL
Qty: 90 TABLET | Refills: 3 | Status: SHIPPED | OUTPATIENT
Start: 2021-01-29 | End: 2021-06-16

## 2021-03-07 ENCOUNTER — COMMUNICATION - HEALTHEAST (OUTPATIENT)
Dept: FAMILY MEDICINE | Facility: CLINIC | Age: 61
End: 2021-03-07

## 2021-03-31 ENCOUNTER — OFFICE VISIT - HEALTHEAST (OUTPATIENT)
Dept: FAMILY MEDICINE | Facility: CLINIC | Age: 61
End: 2021-03-31

## 2021-03-31 ENCOUNTER — COMMUNICATION - HEALTHEAST (OUTPATIENT)
Dept: FAMILY MEDICINE | Facility: CLINIC | Age: 61
End: 2021-03-31

## 2021-03-31 DIAGNOSIS — I13.0 HYPERTENSIVE HEART AND CHRONIC KIDNEY DISEASE WITH HEART FAILURE AND STAGE 1 THROUGH STAGE 4 CHRONIC KIDNEY DISEASE, OR UNSPECIFIED CHRONIC KIDNEY DISEASE (H): ICD-10-CM

## 2021-03-31 DIAGNOSIS — I10 ESSENTIAL HYPERTENSION: ICD-10-CM

## 2021-03-31 DIAGNOSIS — R60.9 EDEMA, UNSPECIFIED TYPE: ICD-10-CM

## 2021-03-31 DIAGNOSIS — M54.50 ACUTE BILATERAL LOW BACK PAIN WITHOUT SCIATICA: ICD-10-CM

## 2021-03-31 LAB
ALBUMIN SERPL-MCNC: 4 G/DL (ref 3.5–5)
ALP SERPL-CCNC: 78 U/L (ref 45–120)
ALT SERPL W P-5'-P-CCNC: 17 U/L (ref 0–45)
ANION GAP SERPL CALCULATED.3IONS-SCNC: 9 MMOL/L (ref 5–18)
AST SERPL W P-5'-P-CCNC: 14 U/L (ref 0–40)
BILIRUB SERPL-MCNC: 1 MG/DL (ref 0–1)
BNP SERPL-MCNC: 281 PG/ML (ref 0–56)
BUN SERPL-MCNC: 22 MG/DL (ref 8–22)
CALCIUM SERPL-MCNC: 9.2 MG/DL (ref 8.5–10.5)
CHLORIDE BLD-SCNC: 107 MMOL/L (ref 98–107)
CO2 SERPL-SCNC: 28 MMOL/L (ref 22–31)
CREAT SERPL-MCNC: 1.01 MG/DL (ref 0.7–1.3)
GFR SERPL CREATININE-BSD FRML MDRD: >60 ML/MIN/1.73M2
GLUCOSE BLD-MCNC: 111 MG/DL (ref 70–125)
POTASSIUM BLD-SCNC: 4.1 MMOL/L (ref 3.5–5)
PROT SERPL-MCNC: 7.4 G/DL (ref 6–8)
SODIUM SERPL-SCNC: 144 MMOL/L (ref 136–145)

## 2021-03-31 NOTE — ASSESSMENT & PLAN NOTE
Uncontrolled blood pressure and lower extremity edema.  Will add chlorothiazide for both problems.  Follow-up 3 weeks.

## 2021-03-31 NOTE — ASSESSMENT & PLAN NOTE
Usually more chronic pain at this point.  Still has not scheduled physical therapy.  Will refer once more

## 2021-03-31 NOTE — ASSESSMENT & PLAN NOTE
Unclear cause, fairly inactive muscle/sedentary.  Also proteinuria.  Check BNP and CMP.  No anasarca.    Start/restart hydrochlorothiazide for blood pressure and edema, Ace wrap lower extremities, follow-up 3 weeks.  Measure for compression stockings.

## 2021-04-01 ENCOUNTER — AMBULATORY - HEALTHEAST (OUTPATIENT)
Dept: FAMILY MEDICINE | Facility: CLINIC | Age: 61
End: 2021-04-01

## 2021-04-01 ENCOUNTER — COMMUNICATION - HEALTHEAST (OUTPATIENT)
Dept: FAMILY MEDICINE | Facility: CLINIC | Age: 61
End: 2021-04-01

## 2021-04-01 DIAGNOSIS — R60.9 EDEMA, UNSPECIFIED TYPE: ICD-10-CM

## 2021-04-02 RX ORDER — HYDROCHLOROTHIAZIDE 25 MG/1
TABLET ORAL
Qty: 90 TABLET | Refills: 0 | Status: SHIPPED | OUTPATIENT
Start: 2021-04-02 | End: 2021-06-16

## 2021-04-12 ENCOUNTER — COMMUNICATION - HEALTHEAST (OUTPATIENT)
Dept: FAMILY MEDICINE | Facility: CLINIC | Age: 61
End: 2021-04-12

## 2021-04-14 ENCOUNTER — COMMUNICATION - HEALTHEAST (OUTPATIENT)
Dept: FAMILY MEDICINE | Facility: CLINIC | Age: 61
End: 2021-04-14

## 2021-04-14 DIAGNOSIS — I10 ESSENTIAL HYPERTENSION: ICD-10-CM

## 2021-04-16 RX ORDER — LOSARTAN POTASSIUM 100 MG/1
100 TABLET ORAL DAILY
Qty: 90 TABLET | Refills: 3 | Status: SHIPPED | OUTPATIENT
Start: 2021-04-16 | End: 2022-07-19

## 2021-04-20 ENCOUNTER — OFFICE VISIT - HEALTHEAST (OUTPATIENT)
Dept: PHYSICAL THERAPY | Facility: REHABILITATION | Age: 61
End: 2021-04-20

## 2021-04-20 ENCOUNTER — AMBULATORY - HEALTHEAST (OUTPATIENT)
Dept: NURSING | Facility: CLINIC | Age: 61
End: 2021-04-20

## 2021-04-20 DIAGNOSIS — R26.9 ABNORMALITY OF GAIT: ICD-10-CM

## 2021-04-20 DIAGNOSIS — G80.9 CEREBRAL PALSY, UNSPECIFIED TYPE (H): ICD-10-CM

## 2021-04-20 DIAGNOSIS — M54.50 ACUTE BILATERAL LOW BACK PAIN: ICD-10-CM

## 2021-04-20 DIAGNOSIS — M62.81 GENERALIZED MUSCLE WEAKNESS: ICD-10-CM

## 2021-04-20 DIAGNOSIS — M53.86 DECREASED ROM OF LUMBAR SPINE: ICD-10-CM

## 2021-04-20 DIAGNOSIS — M54.50 ACUTE BILATERAL LOW BACK PAIN WITHOUT SCIATICA: ICD-10-CM

## 2021-04-30 ENCOUNTER — OFFICE VISIT - HEALTHEAST (OUTPATIENT)
Dept: PHYSICAL THERAPY | Facility: REHABILITATION | Age: 61
End: 2021-04-30

## 2021-04-30 DIAGNOSIS — G80.9 CEREBRAL PALSY, UNSPECIFIED TYPE (H): ICD-10-CM

## 2021-04-30 DIAGNOSIS — M62.81 GENERALIZED MUSCLE WEAKNESS: ICD-10-CM

## 2021-04-30 DIAGNOSIS — R26.9 ABNORMALITY OF GAIT: ICD-10-CM

## 2021-04-30 DIAGNOSIS — M53.86 DECREASED ROM OF LUMBAR SPINE: ICD-10-CM

## 2021-04-30 DIAGNOSIS — M54.50 ACUTE BILATERAL LOW BACK PAIN WITHOUT SCIATICA: ICD-10-CM

## 2021-05-04 ENCOUNTER — RECORDS - HEALTHEAST (OUTPATIENT)
Dept: FAMILY MEDICINE | Facility: CLINIC | Age: 61
End: 2021-05-04

## 2021-05-04 ENCOUNTER — AMBULATORY - HEALTHEAST (OUTPATIENT)
Dept: FAMILY MEDICINE | Facility: CLINIC | Age: 61
End: 2021-05-04

## 2021-05-04 DIAGNOSIS — R60.9 EDEMA, UNSPECIFIED TYPE: ICD-10-CM

## 2021-05-04 DIAGNOSIS — I13.0 HYPERTENSIVE HEART AND CHRONIC KIDNEY DISEASE WITH HEART FAILURE AND STAGE 1 THROUGH STAGE 4 CHRONIC KIDNEY DISEASE, OR UNSPECIFIED CHRONIC KIDNEY DISEASE (H): ICD-10-CM

## 2021-05-07 ENCOUNTER — OFFICE VISIT - HEALTHEAST (OUTPATIENT)
Dept: PHYSICAL THERAPY | Facility: REHABILITATION | Age: 61
End: 2021-05-07

## 2021-05-07 DIAGNOSIS — R26.9 ABNORMALITY OF GAIT: ICD-10-CM

## 2021-05-07 DIAGNOSIS — M53.86 DECREASED ROM OF LUMBAR SPINE: ICD-10-CM

## 2021-05-07 DIAGNOSIS — G80.9 CEREBRAL PALSY, UNSPECIFIED TYPE (H): ICD-10-CM

## 2021-05-07 DIAGNOSIS — M62.81 GENERALIZED MUSCLE WEAKNESS: ICD-10-CM

## 2021-05-07 DIAGNOSIS — M54.50 ACUTE BILATERAL LOW BACK PAIN WITHOUT SCIATICA: ICD-10-CM

## 2021-05-11 ENCOUNTER — AMBULATORY - HEALTHEAST (OUTPATIENT)
Dept: NURSING | Facility: CLINIC | Age: 61
End: 2021-05-11

## 2021-05-14 ENCOUNTER — OFFICE VISIT - HEALTHEAST (OUTPATIENT)
Dept: PHYSICAL THERAPY | Facility: REHABILITATION | Age: 61
End: 2021-05-14

## 2021-05-14 DIAGNOSIS — G80.9 CEREBRAL PALSY, UNSPECIFIED TYPE (H): ICD-10-CM

## 2021-05-14 DIAGNOSIS — M54.50 ACUTE BILATERAL LOW BACK PAIN WITHOUT SCIATICA: ICD-10-CM

## 2021-05-14 DIAGNOSIS — M53.86 DECREASED ROM OF LUMBAR SPINE: ICD-10-CM

## 2021-05-14 DIAGNOSIS — M62.81 GENERALIZED MUSCLE WEAKNESS: ICD-10-CM

## 2021-05-14 DIAGNOSIS — R26.9 ABNORMALITY OF GAIT: ICD-10-CM

## 2021-05-21 ENCOUNTER — OFFICE VISIT - HEALTHEAST (OUTPATIENT)
Dept: PHYSICAL THERAPY | Facility: REHABILITATION | Age: 61
End: 2021-05-21

## 2021-05-21 DIAGNOSIS — R26.9 ABNORMALITY OF GAIT: ICD-10-CM

## 2021-05-21 DIAGNOSIS — G80.9 CEREBRAL PALSY, UNSPECIFIED TYPE (H): ICD-10-CM

## 2021-05-21 DIAGNOSIS — M53.86 DECREASED ROM OF LUMBAR SPINE: ICD-10-CM

## 2021-05-21 DIAGNOSIS — M54.50 ACUTE BILATERAL LOW BACK PAIN WITHOUT SCIATICA: ICD-10-CM

## 2021-05-21 DIAGNOSIS — M62.81 GENERALIZED MUSCLE WEAKNESS: ICD-10-CM

## 2021-05-24 ENCOUNTER — RECORDS - HEALTHEAST (OUTPATIENT)
Dept: ADMINISTRATIVE | Facility: CLINIC | Age: 61
End: 2021-05-24

## 2021-05-25 ENCOUNTER — RECORDS - HEALTHEAST (OUTPATIENT)
Dept: ADMINISTRATIVE | Facility: CLINIC | Age: 61
End: 2021-05-25

## 2021-05-26 ENCOUNTER — RECORDS - HEALTHEAST (OUTPATIENT)
Dept: ADMINISTRATIVE | Facility: CLINIC | Age: 61
End: 2021-05-26

## 2021-05-26 VITALS — DIASTOLIC BLOOD PRESSURE: 70 MMHG | SYSTOLIC BLOOD PRESSURE: 144 MMHG

## 2021-05-27 ENCOUNTER — RECORDS - HEALTHEAST (OUTPATIENT)
Dept: ADMINISTRATIVE | Facility: CLINIC | Age: 61
End: 2021-05-27

## 2021-05-27 VITALS
DIASTOLIC BLOOD PRESSURE: 82 MMHG | RESPIRATION RATE: 12 BRPM | TEMPERATURE: 98.4 F | SYSTOLIC BLOOD PRESSURE: 180 MMHG | HEART RATE: 88 BPM

## 2021-05-27 ASSESSMENT — PATIENT HEALTH QUESTIONNAIRE - PHQ9: SUM OF ALL RESPONSES TO PHQ QUESTIONS 1-9: 0

## 2021-05-28 ENCOUNTER — RECORDS - HEALTHEAST (OUTPATIENT)
Dept: ADMINISTRATIVE | Facility: CLINIC | Age: 61
End: 2021-05-28

## 2021-05-28 ENCOUNTER — OFFICE VISIT - HEALTHEAST (OUTPATIENT)
Dept: PHYSICAL THERAPY | Facility: REHABILITATION | Age: 61
End: 2021-05-28

## 2021-05-28 ENCOUNTER — HOSPITAL ENCOUNTER (OUTPATIENT)
Dept: CARDIOLOGY | Facility: HOSPITAL | Age: 61
Discharge: HOME | End: 2021-05-28
Attending: FAMILY MEDICINE

## 2021-05-28 DIAGNOSIS — I13.0 HYPERTENSIVE HEART AND CHRONIC KIDNEY DISEASE WITH HEART FAILURE AND STAGE 1 THROUGH STAGE 4 CHRONIC KIDNEY DISEASE, OR UNSPECIFIED CHRONIC KIDNEY DISEASE (H): ICD-10-CM

## 2021-05-28 DIAGNOSIS — M53.86 DECREASED ROM OF LUMBAR SPINE: ICD-10-CM

## 2021-05-28 DIAGNOSIS — M54.50 ACUTE BILATERAL LOW BACK PAIN WITHOUT SCIATICA: ICD-10-CM

## 2021-05-28 DIAGNOSIS — G80.9 CEREBRAL PALSY, UNSPECIFIED TYPE (H): ICD-10-CM

## 2021-05-28 DIAGNOSIS — R26.9 ABNORMALITY OF GAIT: ICD-10-CM

## 2021-05-28 DIAGNOSIS — R60.9 EDEMA, UNSPECIFIED TYPE: ICD-10-CM

## 2021-05-28 DIAGNOSIS — M62.81 GENERALIZED MUSCLE WEAKNESS: ICD-10-CM

## 2021-05-28 LAB
AORTIC ROOT: 3.4 CM
AORTIC VALVE MEAN VELOCITY: 111 CM/S
AR DECEL SLOPE: 3320 MM/S2
AR PEAK VELOCITY: 410 CM/S
ASCENDING AORTA: 3.2 CM
AV DIMENSIONLESS INDEX VTI: 0.5
AV MEAN GRADIENT: 5 MMHG
AV PEAK GRADIENT: 9.6 MMHG
AV REGURGITANT PEAK GRADIENT: 67.2 MMHG
AV REGURGITATION PRESSURE HALF TIME: 366 MS
AV VALVE AREA: 1.3 CM2
AV VELOCITY RATIO: 0.4
BSA FOR ECHO PROCEDURE: 1.88 M2
CV BLOOD PRESSURE: ABNORMAL MMHG
CV ECHO HEIGHT: 64.8 IN
CV ECHO WEIGHT: 170 LBS
DOP CALC AO PEAK VEL: 155 CM/S
DOP CALC AO VTI: 24.7 CM
DOP CALC LVOT AREA: 2.54 CM2
DOP CALC LVOT DIAMETER: 1.8 CM
DOP CALC LVOT PEAK VEL: 60.2 CM/S
DOP CALC LVOT STROKE VOLUME: 32.8 CM3
DOP CALCLVOT PEAK VEL VTI: 12.9 CM
FRACTIONAL SHORTENING: 34.1 % (ref 28–44)
INTERVENTRICULAR SEPTUM IN END DIASTOLE: 1.2 CM (ref 0.6–1)
IVS/PW RATIO: 1
LA AREA 1: 21.8 CM2
LA AREA 2: 23.8 CM2
LEFT ATRIUM LENGTH: 5.77 CM
LEFT ATRIUM SIZE: 4.1 CM
LEFT ATRIUM TO AORTIC ROOT RATIO: 1.21 NO UNITS
LEFT ATRIUM VOLUME INDEX: 40.7 ML/M2
LEFT ATRIUM VOLUME: 76.4 ML
LEFT VENTRICLE CARDIAC INDEX: 1.6 L/MIN/M2
LEFT VENTRICLE CARDIAC OUTPUT: 3.1 L/MIN
LEFT VENTRICLE HEART RATE: 94 BPM
LEFT VENTRICLE MASS INDEX: 101.8 G/M2
LEFT VENTRICULAR INTERNAL DIMENSION IN DIASTOLE: 4.4 CM (ref 4.2–5.8)
LEFT VENTRICULAR INTERNAL DIMENSION IN SYSTOLE: 2.9 CM (ref 2.5–4)
LEFT VENTRICULAR MASS: 191.3 G
LEFT VENTRICULAR OUTFLOW TRACT MEAN GRADIENT: 1 MMHG
LEFT VENTRICULAR OUTFLOW TRACT MEAN VELOCITY: 41.6 CM/S
LEFT VENTRICULAR OUTFLOW TRACT PEAK GRADIENT: 1 MMHG
LEFT VENTRICULAR POSTERIOR WALL IN END DIASTOLE: 1.2 CM (ref 0.6–1)
LV STROKE VOLUME INDEX: 17.5 ML/M2
MITRAL VALVE E/A RATIO: 1.6
MV AVERAGE E/E' RATIO: 23.1 CM/S
MV DECELERATION TIME: 156 MS
MV E'TISSUE VEL-LAT: 4.68 CM/S
MV E'TISSUE VEL-MED: 4.48 CM/S
MV LATERAL E/E' RATIO: 22.6
MV MEDIAL E/E' RATIO: 23.7
MV PEAK A VELOCITY: 67.1 CM/S
MV PEAK E VELOCITY: 106 CM/S
NUC REST DIASTOLIC VOLUME INDEX: 2720 LBS
NUC REST SYSTOLIC VOLUME INDEX: 64.75 IN
PR MAX PG: 5 MMHG
PR PEAK VELOCITY: 115 CM/S
PV ACCELERATION TIME: 111 MS
TRICUSPID REGURGITATION PEAK PRESSURE GRADIENT: 29.8 MMHG
TRICUSPID VALVE ANULAR PLANE SYSTOLIC EXCURSION: 1.6 CM
TRICUSPID VALVE PEAK REGURGITANT VELOCITY: 273 CM/S

## 2021-05-28 ASSESSMENT — MIFFLIN-ST. JEOR: SCORE: 1489.02

## 2021-05-29 ENCOUNTER — RECORDS - HEALTHEAST (OUTPATIENT)
Dept: ADMINISTRATIVE | Facility: CLINIC | Age: 61
End: 2021-05-29

## 2021-05-29 NOTE — TELEPHONE ENCOUNTER
RN cannot approve Refill Request    RN can NOT refill this medication overdue for office visits and/or labs.    Clint Galarza, [facility] Triage/Med Refill 6/19/2019    Requested Prescriptions   Pending Prescriptions Disp Refills     tamsulosin (FLOMAX) 0.4 mg cap [Pharmacy Med Name: TAMSULOSIN 0.4MG CAPSULES] 90 capsule 6     Sig: TAKE 1 CAPSULE(0.4 MG) BY MOUTH DAILY AFTER BREAKFAST       Alfuzosin/Tamsulosin/Silodosin Refill Protocol  Passed - 6/18/2019  3:55 PM        Passed - PCP or prescribing provider visit in past 12 months       Last office visit with prescriber/PCP: 6/18/2019 Ladarius Soria MD OR same dept: 6/18/2019 Ladarius Soria MD OR same specialty: 6/18/2019 Ladarius Soria MD  Last physical: Visit date not found Last MTM visit: Visit date not found   Next visit within 3 mo: Visit date not found  Next physical within 3 mo: Visit date not found  Prescriber OR PCP: Ladarius Soria MD  Last diagnosis associated with med order: 1. Benign prostatic hyperplasia with lower urinary tract symptoms, symptom details unspecified  - tamsulosin (FLOMAX) 0.4 mg cap [Pharmacy Med Name: TAMSULOSIN 0.4MG CAPSULES]; TAKE 1 CAPSULE(0.4 MG) BY MOUTH DAILY AFTER BREAKFAST  Dispense: 90 capsule; Refill: 6    2. Essential hypertension  - losartan (COZAAR) 50 MG tablet [Pharmacy Med Name: LOSARTAN 50MG TABLETS]; TAKE 1 TABLET(50 MG) BY MOUTH DAILY  Dispense: 90 tablet; Refill: 3    3. Left Ventricular Hypertrophy  - losartan (COZAAR) 50 MG tablet [Pharmacy Med Name: LOSARTAN 50MG TABLETS]; TAKE 1 TABLET(50 MG) BY MOUTH DAILY  Dispense: 90 tablet; Refill: 3    If protocol passes may refill for 12 months if within 3 months of last provider visit (or a total of 15 months).             losartan (COZAAR) 50 MG tablet [Pharmacy Med Name: LOSARTAN 50MG TABLETS] 90 tablet 3     Sig: TAKE 1 TABLET(50 MG) BY MOUTH DAILY       Angiotensin Receptor Blocker Protocol Failed - 6/18/2019  3:55 PM         Failed - Serum potassium within the past 12 months     Lab Results   Component Value Date    Potassium 3.7 06/18/2019             Failed - Serum creatinine within the past 12 months     Creatinine   Date Value Ref Range Status   06/18/2019 1.01 0.70 - 1.30 mg/dL Final             Passed - PCP or prescribing provider visit in past 12 months       Last office visit with prescriber/PCP: 6/18/2019 Ladarius Soria MD OR same dept: 6/18/2019 Ladarius Soria MD OR same specialty: 6/18/2019 Ladarius Soria MD  Last physical: Visit date not found Last MTM visit: Visit date not found   Next visit within 3 mo: Visit date not found  Next physical within 3 mo: Visit date not found  Prescriber OR PCP: Ladarius Sorai MD  Last diagnosis associated with med order: 1. Benign prostatic hyperplasia with lower urinary tract symptoms, symptom details unspecified  - tamsulosin (FLOMAX) 0.4 mg cap [Pharmacy Med Name: TAMSULOSIN 0.4MG CAPSULES]; TAKE 1 CAPSULE(0.4 MG) BY MOUTH DAILY AFTER BREAKFAST  Dispense: 90 capsule; Refill: 6    2. Essential hypertension  - losartan (COZAAR) 50 MG tablet [Pharmacy Med Name: LOSARTAN 50MG TABLETS]; TAKE 1 TABLET(50 MG) BY MOUTH DAILY  Dispense: 90 tablet; Refill: 3    3. Left Ventricular Hypertrophy  - losartan (COZAAR) 50 MG tablet [Pharmacy Med Name: LOSARTAN 50MG TABLETS]; TAKE 1 TABLET(50 MG) BY MOUTH DAILY  Dispense: 90 tablet; Refill: 3    If protocol passes may refill for 12 months if within 3 months of last provider visit (or a total of 15 months).             Passed - Blood pressure filed in past 12 months     BP Readings from Last 1 Encounters:   06/18/19 (!) 162/100

## 2021-05-29 NOTE — TELEPHONE ENCOUNTER
----- Message from Ladarius Soria MD sent at 6/19/2019  4:29 PM CDT -----  This contact patient.  Let him know that his cholesterol is high and that I think he should be on medication for this.  I have sent him a letter detailing the rest of his lab results.  We can talk about this on follow-up in 3 or 4 weeks when he comes in.  Have him bring in his lab letter at that time.    The meantime I called in atorvastatin which is similar but a little different than the medication he was on before.  Should take 1 pill per night

## 2021-05-30 ENCOUNTER — RECORDS - HEALTHEAST (OUTPATIENT)
Dept: ADMINISTRATIVE | Facility: CLINIC | Age: 61
End: 2021-05-30

## 2021-05-30 NOTE — PROGRESS NOTES
Assessment/ Plan  Problem List Items Addressed This Visit        High    Essential hypertension (Chronic)     Hypertension is well controlled;    BP Readings from Last 3 Encounters:   07/18/19 138/82   06/25/19 134/61   06/18/19 (!) 162/100       Current antihypertensives losartan 50 mg  Patient has long-standing proteinuria, on the order of 300 mg/day semiquantitative estimate  Plan: No change in blood pressure medicine, work-up proteinuria    Results for orders placed or performed during the hospital encounter of 06/25/19   Basic Metabolic Panel   Result Value Ref Range    Sodium 141 136 - 145 mmol/L    Potassium 3.4 (L) 3.5 - 5.0 mmol/L    Chloride 104 98 - 107 mmol/L    CO2 23 22 - 31 mmol/L    Anion Gap, Calculation 14 5 - 18 mmol/L    Glucose 199 (H) 70 - 125 mg/dL    Calcium 9.4 8.5 - 10.5 mg/dL    BUN 16 8 - 22 mg/dL    Creatinine 1.37 (H) 0.70 - 1.30 mg/dL    GFR MDRD Af Amer >60 >60 mL/min/1.73m2    GFR MDRD Non Af Amer 53 (L) >60 mL/min/1.73m2                 Left Ventricular Hypertrophy - Primary     Markedly abnormal EKG, last echocardiogram was 8 years ago, recheck         Relevant Orders    Comprehensive Metabolic Panel    Echo Complete       Unprioritized    Proteinuria     Long-standing.  Reviewed chart and it appears that the first inkling of some proteinuria occurred on urinalysis in 2015.  Had a high level in 2017 and this is gone down somewhat.  Now on the order of 300 mg/dL; to take it if.  Has not been worked up.  Given LVH, suspect this may be related to long-standing untreated hypertension from the past but no proof of that.    Will do serology work-up, check 24-hour urine protein, hold all ibuprofen, reassess, consider referral to nephrology         Relevant Orders    Protein, 24 Hour Urine    Uric Acid    Electrophoresis, Protein, Serum    Syphilis Screen, [place]    HIV Antigen/Antibody Screening [place]    Erythrocyte Sedimentation Rate (Completed)    Hepatitis Acute Evaluation    Benign  prostatic hyperplasia with lower urinary tract symptoms, symptom details unspecified     Modest improvement in symptoms with tamsulosin but patient is complaining of daytime sleepiness and fairly convinced that is medication related.  Will hold this for 2 weeks, restart if it does not seem related to the sleepiness         Daytime sleepiness     Recently started 3 medications which he believes are the culprit.  Losartan is unlikely to be a problem, atorvastatin unlikely but possible, tamsulosin possible.  Will hold the last 2 for 2 weeks, see if symptoms improve.  If not improving, consider sleep study if patient does not sleep with anybody and is not sure if he snores           Other Visit Diagnoses     Encounter for screening for HIV         Relevant Orders    HIV Antigen/Antibody Screening [facility]    Weakness         Relevant Orders    Hepatitis Acute Evaluation        Subjective  CC:  Chief Complaint   Patient presents with     Follow-up     HPI:    59-year-old with cerebral palsy and hypertension and chronic proteinuria, sub-nephrotic level, comes in for follow-up on emergency room visit and hypertension and BPH.    Last visit his blood pressure was very high.  He had stopped his blood pressure medications which included a diuretic.  He had frequent urination.  I restarted losartan for blood pressure.  Labs were done which showed a normal BMP, estimated 300 mg/day protein excretion in his urine based on semi-quantitative level.  Total cholesterol is 210, HDL was 41.  ALT was 14.    Atorvastatin 40 mg was started.    Patient was seen in the emergency room 6/25/2019, 1 week after I saw him for elevated blood pressure and BPH.  He was feeling weak, came on suddenly with presyncope when he was waiting for the bus.  Ate a candy bar which did not help, asked the pedestrian to call an ambulance.  He admitted that he had been drinking less water.  Seen in the emergency room, with blood pressure was 133/63 with a pulse  of 94 O2 sat was 94% mucous membranes were dry, hemoglobin was normal, potassium was 3.4 glucose was elevated at 199, creatinine was 1.37 and BUN was 16, troponin 0 0.03 EKG showed ST depression in inferior leads.  Possible left atrial enlargement  He complains of quite severe sleepiness since starting medications.  Then with pushing fluids, still feels at times lightheaded.  Does not know if he snores.  Does wake up doing well rested and denies depressionPFSH:  Patient Active Problem List   Diagnosis     Unspecified glaucoma     Neuritis     Essential hypertension     Osteoarthritis Of The Knee     Cerebral Palsy     Left Ventricular Hypertrophy     Esophageal reflux     Aortic Sclerosis     Benign Adenomatous Polyp Of The Large Intestine     Diverticulosis     Hypercholesterolemia     Low back pain     Proteinuria     Headache     Cough     Urinary frequency     Healthcare maintenance     Benign prostatic hyperplasia with lower urinary tract symptoms, symptom details unspecified     Daytime sleepiness     Current medications reviewed as follows:  Current Outpatient Medications on File Prior to Visit   Medication Sig     atorvastatin (LIPITOR) 40 MG tablet TAKE 1 TABLET(40 MG) BY MOUTH AT BEDTIME     losartan (COZAAR) 50 MG tablet TAKE 1 TABLET(50 MG) BY MOUTH DAILY     tamsulosin (FLOMAX) 0.4 mg cap TAKE 1 CAPSULE(0.4 MG) BY MOUTH DAILY AFTER BREAKFAST     ASCORBATE CALCIUM (VITAMIN C ORAL) 1 tablet daily. TABS     calcium-magnesium-zinc Tab 1 tablet daily.     fluticasone (ALLERGY RELIEF, FLUTICASONE,) 50 mcg/actuation nasal spray 2 sprays each nostril once daily     loratadine (CLARITIN) 10 mg tablet Take 1 tablet (10 mg total) by mouth daily.     omeprazole (PRILOSEC) 20 MG capsule Take 1 capsule (20 mg total) by mouth daily.     potassium chloride (MICRO-K) 10 mEq CR capsule Take 1 capsule (10 mEq total) by mouth daily.     timolol maleate (TIMOPTIC) 0.5 % ophthalmic solution Administer 1 drop to both eyes  daily.     [DISCONTINUED] ibuprofen (ADVIL,MOTRIN) 600 MG tablet 600 mg PO TID for 5 days scheduled then TID PRN     [DISCONTINUED] lovastatin (MEVACOR) 40 MG tablet Take 2 tablets (80 mg total) by mouth at bedtime.     No current facility-administered medications on file prior to visit.      Social History     Tobacco Use   Smoking Status Never Smoker   Smokeless Tobacco Never Used     Social History     Social History Narrative     Not on file     Patient Care Team:  Ladarius Soria MD as PCP - General  ROS  Full 10 system review including constitutional, respiratory, cardiac, gi, urinary, rheumatologic, neurologic, reproductive, dermatologic psychiatric is  performed  and is otherwise negative         Objective  Physical Exam  Vitals:    07/18/19 1452   BP: 138/82   Patient Site: Left Arm   Patient Position: Sitting   Cuff Size: Adult Regular   Pulse: 82   Resp: 18   Weight: 161 lb (73 kg)     Body mass index is 26.79 kg/m .  Gen- alert, oriented/ appropriately responsive  HEENT- normal cephalic, atraumatic.   Chest- Normal inspiration and expiration.    Clear to ascultation.    No chest wall deformity or scar.  CV- Heart regular rate and rhythm  normal tones, no murmurs   No gallops or rubs.  Ext- appear well perfused, no edema  Skin- warm and dry,   no visualized rash    Diagnostics:   Pending      Please note: Voice recognition software was used in this dictation.  It may therefore contain typographical errors.

## 2021-05-31 VITALS — WEIGHT: 165 LBS | HEIGHT: 66 IN | BODY MASS INDEX: 26.52 KG/M2

## 2021-05-31 VITALS — BODY MASS INDEX: 26.36 KG/M2 | HEIGHT: 66 IN | WEIGHT: 164 LBS

## 2021-05-31 NOTE — PROGRESS NOTES
Assessment/ Plan  Problem List Items Addressed This Visit        High    Essential hypertension (Chronic)     Really controlled today because he is out of his losartan.  Refill this medication, ask him to come in in the near future for nurse only check.  Previous blood pressures were good on this medication, anticipate success         Relevant Medications    losartan (COZAAR) 50 MG tablet    Hypercholesterolemia (Chronic)     Did a trial of stopping medications and realized that it was the atorvastatin that was making him tired.  Cut back to 20 mg daily, problem got better.  We will continue this level.         Left Ventricular Hypertrophy     Reordered echocardiogram as patient overslept and missed his appointment         Relevant Medications    losartan (COZAAR) 50 MG tablet       Unprioritized    Benign prostatic hyperplasia with lower urinary tract symptoms, symptom details unspecified - Primary     Doing well, no significant side effects of tamsulosin.  Continue medication             Subjective  CC:  Chief Complaint   Patient presents with     Follow-up     HPI:  Bright is here to follow-up from last visit.  At that time he was seen for hypertension, taking losartan, noted to have proteinuria.  Serologic work-up was done at that time, reviewing now, showed BUN slightly elevated at 25 but normal creatinine serum protein electrophoresis showed borderline elevated gammaglobulin but felt to be unremarkable by pathologist interpreting results sed rate was normal acute hepatitis evaluation negative HIV negative syphilis done and negative uric acid 5.3 he brought in his 24-hour protein today    Echocardiogram was ordered but I do not see that it has been completed    Patient also had symptoms of moderate to severe BPH with an enlarged prostate.  Urinalysis was checked which was negative except for the protein.  He was started on tamsulosin    Reports that he is tolerating the tamsulosin well    Reports that he missed  his echocardiogram because he overslept    Reports that he is run out of his losartan for the last few days and needs a refill.  To this blood pressures were good.      PFSH:  Patient Active Problem List   Diagnosis     Unspecified glaucoma     Neuritis     Essential hypertension     Osteoarthritis Of The Knee     Cerebral Palsy     Left Ventricular Hypertrophy     Esophageal reflux     Aortic Sclerosis     Benign Adenomatous Polyp Of The Large Intestine     Diverticulosis     Hypercholesterolemia     Low back pain     Proteinuria     Headache     Cough     Urinary frequency     Healthcare maintenance     Benign prostatic hyperplasia with lower urinary tract symptoms, symptom details unspecified     Daytime sleepiness     Current medications reviewed as follows:  Current Outpatient Medications on File Prior to Visit   Medication Sig     atorvastatin (LIPITOR) 40 MG tablet TAKE 1 TABLET(40 MG) BY MOUTH AT BEDTIME     tamsulosin (FLOMAX) 0.4 mg cap TAKE 1 CAPSULE(0.4 MG) BY MOUTH DAILY AFTER BREAKFAST     [DISCONTINUED] losartan (COZAAR) 50 MG tablet TAKE 1 TABLET(50 MG) BY MOUTH DAILY     ASCORBATE CALCIUM (VITAMIN C ORAL) 1 tablet daily. TABS     calcium-magnesium-zinc Tab 1 tablet daily.     fluticasone (ALLERGY RELIEF, FLUTICASONE,) 50 mcg/actuation nasal spray 2 sprays each nostril once daily     loratadine (CLARITIN) 10 mg tablet Take 1 tablet (10 mg total) by mouth daily.     omeprazole (PRILOSEC) 20 MG capsule Take 1 capsule (20 mg total) by mouth daily.     potassium chloride (MICRO-K) 10 mEq CR capsule Take 1 capsule (10 mEq total) by mouth daily.     timolol maleate (TIMOPTIC) 0.5 % ophthalmic solution Administer 1 drop to both eyes daily.     No current facility-administered medications on file prior to visit.      Social History     Tobacco Use   Smoking Status Never Smoker   Smokeless Tobacco Never Used     Social History     Social History Narrative     Not on file     Patient Care Team:  Ladarius Soria  MD Carrington as PCP - General  ROS  Full 10 system review including constitutional, respiratory, cardiac, gi, urinary, rheumatologic, neurologic, reproductive, dermatologic psychiatric is  performed  and is otherwise negative         Objective  Physical Exam  Vitals:    08/15/19 1428 08/15/19 1511   BP: 158/82 162/78   Patient Site: Right Arm    Patient Position: Sitting    Cuff Size: Adult Regular    Pulse: 89    Resp: 18    Weight: 160 lb (72.6 kg)      Body mass index is 26.63 kg/m .  Gen- alert, oriented/ appropriately responsive  HEENT- normal cephalic, atraumatic.   Chest- Normal inspiration and expiration.    Clear to ascultation.    No chest wall deformity or scar.  CV- Heart regular rate and rhythm  normal tones, no murmurs   No gallops or rubs.  Ext- appear well perfused, no edema  Skin- warm and dry,   no visualized rash    Diagnostics:   None      Please note: Voice recognition software was used in this dictation.  It may therefore contain typographical errors.

## 2021-06-01 VITALS — WEIGHT: 158 LBS | BODY MASS INDEX: 25.7 KG/M2

## 2021-06-01 VITALS — HEIGHT: 66 IN | WEIGHT: 164 LBS | BODY MASS INDEX: 26.36 KG/M2

## 2021-06-01 VITALS — WEIGHT: 154 LBS | BODY MASS INDEX: 25.05 KG/M2

## 2021-06-01 NOTE — PROGRESS NOTES
I met with Bright Lockett at the request of Dr. Soria to recheck his blood pressure.  Blood pressure medications on the MAR were reviewed with patient.    Patient has taken all medications as per usual regimen: No, patient states he takes one bp med in the morning and one in the evening.  Patient reports tolerating them without any issues or concerns: Yes    Vitals:    09/05/19 1453   BP: 144/70   Patient Site: Left Arm   Patient Position: Sitting   Cuff Size: Adult Large       Blood pressure was taken, previous encounter was reviewed, recorded blood pressure below 140/90.  Patient was discharged and the note will be sent to the provider for final review.    Patient asked me to speak with Dr. Soria about taking half of a tablet instead of a whole tablet for ATORVASTATIN 40MG. Per Dr. Soria, provider was informed of patients concern, and said he will address by either calling the patient or having a nurse call.

## 2021-06-01 NOTE — TELEPHONE ENCOUNTER
Refill Approved    Rx renewed per Medication Renewal Policy. Medication was last renewed on 9/5/19. (Requesting 90 day supply)    Kenyatta Deutsch, [facility] Triage/Med Refill 9/5/2019     Requested Prescriptions   Pending Prescriptions Disp Refills     atorvastatin (LIPITOR) 20 MG tablet [Pharmacy Med Name: ATORVASTATIN 20MG TABLETS] 90 tablet 6     Sig: TAKE 1 TABLET(20 MG) BY MOUTH AT BEDTIME       Statins Refill Protocol (Hmg CoA Reductase Inhibitors) Passed - 9/5/2019  4:10 PM        Passed - PCP or prescribing provider visit in past 12 months      Last office visit with prescriber/PCP: 8/15/2019 Ladarius Soria MD OR same dept: 8/15/2019 Ladarius Soria MD OR same specialty: 8/15/2019 Ladarius Soria MD  Last physical: Visit date not found Last MTM visit: Visit date not found   Next visit within 3 mo: Visit date not found  Next physical within 3 mo: Visit date not found  Prescriber OR PCP: Ladarius Soria MD  Last diagnosis associated with med order: 1. Hypercholesterolemia  - atorvastatin (LIPITOR) 20 MG tablet [Pharmacy Med Name: ATORVASTATIN 20MG TABLETS]; TAKE 1 TABLET(20 MG) BY MOUTH AT BEDTIME  Dispense: 90 tablet; Refill: 6    If protocol passes may refill for 12 months if within 3 months of last provider visit (or a total of 15 months).

## 2021-06-02 ENCOUNTER — RECORDS - HEALTHEAST (OUTPATIENT)
Dept: ADMINISTRATIVE | Facility: CLINIC | Age: 61
End: 2021-06-02

## 2021-06-03 VITALS — HEIGHT: 66 IN | BODY MASS INDEX: 25.55 KG/M2 | WEIGHT: 159 LBS

## 2021-06-03 VITALS — BODY MASS INDEX: 26.79 KG/M2 | WEIGHT: 161 LBS

## 2021-06-03 VITALS — BODY MASS INDEX: 26.63 KG/M2 | WEIGHT: 160 LBS

## 2021-06-05 VITALS
SYSTOLIC BLOOD PRESSURE: 195 MMHG | DIASTOLIC BLOOD PRESSURE: 92 MMHG | RESPIRATION RATE: 16 BRPM | TEMPERATURE: 98.2 F | HEART RATE: 105 BPM

## 2021-06-05 VITALS
TEMPERATURE: 98.3 F | SYSTOLIC BLOOD PRESSURE: 181 MMHG | BODY MASS INDEX: 28.32 KG/M2 | HEART RATE: 111 BPM | RESPIRATION RATE: 17 BRPM | HEIGHT: 65 IN | WEIGHT: 170 LBS | DIASTOLIC BLOOD PRESSURE: 83 MMHG

## 2021-06-06 NOTE — TELEPHONE ENCOUNTER
Not sure what the question is here.  We will have PCP address tomorrow.  It appears patient should probably come in for an appointment...

## 2021-06-07 NOTE — TELEPHONE ENCOUNTER
Please let the patient know that Dr. Soria will be back next week, at which time Dr. Soria will review this.  If the patient feels like he wants something done before then, have him let us know

## 2021-06-08 NOTE — TELEPHONE ENCOUNTER
Dr. Vargas, Mr. Goldman would like to get tested for COVID-19, are you willing to order this for him? He prefers the [facility] location if you are agreeable. Thank you.

## 2021-06-08 NOTE — TELEPHONE ENCOUNTER
----- Message from Ladarius Soria MD sent at 5/8/2020  1:40 PM CDT -----  Regarding: covid testing  This patient has a worsening cough and should get covid testing  Please give him inof on how to do this (onCARE)  And, he does not drive- if that changes anything from our end

## 2021-06-09 NOTE — TELEPHONE ENCOUNTER
Medication refill requested. Please authorize medication if appropriate.   I do not see a prescription for potassium .

## 2021-06-09 NOTE — PROGRESS NOTES
"Bright Lockett is a 60 y.o. male who is being evaluated via a billable telephone visit.      The patient has been notified of following:     \"This telephone visit will be conducted via a call between you and your physician/provider. We have found that certain health care needs can be provided without the need for a physical exam.  This service lets us provide the care you need with a short phone conversation.  If a prescription is necessary we can send it directly to your pharmacy.  If lab work is needed we can place an order for that and you can then stop by our lab to have the test done at a later time.    Telephone visits are billed at different rates depending on your insurance coverage. During this emergency period, for some insurers they may be billed the same as an in-person visit.  Please reach out to your insurance provider with any questions.    If during the course of the call the physician/provider feels a telephone visit is not appropriate, you will not be charged for this service.\"    Patient has given verbal consent to a Telephone visit? Yes    What phone number would you like to be contacted at? [phone]    Patient would like to receive their AVS by AVS Preference: Dotty.    Additional provider notes:  Follow up emergency room visit, [facility] on 7/4. Right hip pain, x-ray suggested osteoarthritis. Patient describes more weakness in the leg. Had elevated blood sugar and low potassium at this time as well as slightly elevated creatinine. Treated with potassium chloride, 20 Vicodin. Blood pressures were good, actually on the low side.    Currently on low Vishal 50 mg, atorvastatin 20.Blood pressure machine not working well.  Assessment/Plan:    Problem List Items Addressed This Visit        High    Essential hypertension (Chronic)      Other Visit Diagnoses     Osteoarthritis of right hip, unspecified osteoarthritis type    -  Primary    Hypokalemia        Hyperglycemia        Elevated serum " creatinine          Plan, no change in hypertension treatment, continue potassium supplements, recheck BMP, hemoglobin, vitamin D and A-1 C.  Phone call duration:  15 minutes    Ladarius Soria MD

## 2021-06-11 ENCOUNTER — OFFICE VISIT - HEALTHEAST (OUTPATIENT)
Dept: PHYSICAL THERAPY | Facility: REHABILITATION | Age: 61
End: 2021-06-11

## 2021-06-11 DIAGNOSIS — M62.81 GENERALIZED MUSCLE WEAKNESS: ICD-10-CM

## 2021-06-11 DIAGNOSIS — M54.50 ACUTE BILATERAL LOW BACK PAIN WITHOUT SCIATICA: ICD-10-CM

## 2021-06-11 DIAGNOSIS — M53.86 DECREASED ROM OF LUMBAR SPINE: ICD-10-CM

## 2021-06-11 DIAGNOSIS — R26.9 ABNORMALITY OF GAIT: ICD-10-CM

## 2021-06-11 DIAGNOSIS — G80.9 CEREBRAL PALSY, UNSPECIFIED TYPE (H): ICD-10-CM

## 2021-06-11 NOTE — TELEPHONE ENCOUNTER
Please see message below regarding medication. Medication refill requested. Please authorize medication if appropriate.

## 2021-06-13 NOTE — PROGRESS NOTES
"Assessment/ Plan  1. Cough  Suspect this is cough variant asthma  Possibly triggered by allergies    Nebulizer \"took the edge off\" the cough but did not solve it.  Will not choose to use bronchodilator  Prednisone 5 days.  Loratadine around this time to help prevent problems.  Patient does use lisinopril which could be playing a role but suspect it is not because the cough is seasonal.    Discussed option of whether to do pulmonary function tests.  Since there is no shortness of breath, problem is pretty limited and his transportation is difficulty, will hold off for now.  - albuterol nebulizer solution 3 mL (PROVENTIL); Take 3 mL by nebulization once.    2. HTN (hypertension)  Well-controlled with lisinopril and hydrochlorothiazide.  Labs done in February.  No changes  - hydroCHLOROthiazide (HYDRODIURIL) 25 MG tablet; TAKE ONE TABLET BY MOUTH DAILY  Dispense: 90 tablet; Refill: 3    3. Health care maintenance    - Influenza, Seasonal,Quad Inj, 36+ MOS    4. Esophageal reflux  Potentially playing a role with the cough but symptoms are pretty well controlled.  - omeprazole (PRILOSEC) 20 MG capsule; Take 1 capsule (20 mg total) by mouth daily.  Dispense: 90 capsule; Refill: 2        Body mass index is 26.83 kg/(m^2).    Subjective  CC:  Chief Complaint   Patient presents with     Cough     HPI:  Cough  Narrative spring and fall cough  Mild cough   -----------------------------------------  Duration: 1 month this fall but again has been twice year, usually lasts about a month  Associated URI symptoms?  Mild runny nose, mild throat irritation  Productive?  Mildly productive- clear phlegm  SOB?  no  Severity   comes  Context, Other associated symptoms:   After meals    Does cough more after.    No allergy meds      Same duration    Patient Active Problem List   Diagnosis     Glaucoma     Neuritis     Essential hypertension     Osteoarthritis Of The Knee     Cerebral Palsy     Left Ventricular Hypertrophy     Esophageal " "reflux     Aortic Sclerosis     Benign Adenomatous Polyp Of The Large Intestine     Diverticulosis     Hypercholesterolemia     Low back pain     Current medications reviewed as follows:  Current Outpatient Prescriptions on File Prior to Visit   Medication Sig     ASCORBATE CALCIUM (VITAMIN C ORAL) 1 tablet daily. TABS     calcium-magnesium-zinc Tab 1 tablet daily.     ibuprofen (ADVIL,MOTRIN) 600 MG tablet 600 mg PO TID for 5 days scheduled then TID PRN     potassium chloride (MICRO-K) 10 mEq CR capsule Take 1 capsule (10 mEq total) by mouth daily.     timolol maleate (TIMOPTIC) 0.5 % ophthalmic solution Administer 1 drop to both eyes daily.     [DISCONTINUED] hydroCHLOROthiazide (HYDRODIURIL) 25 MG tablet TAKE ONE TABLET BY MOUTH DAILY     [DISCONTINUED] lisinopril (PRINIVIL,ZESTRIL) 20 MG tablet Take 1 tablet (20 mg total) by mouth daily.     [DISCONTINUED] lovastatin (MEVACOR) 40 MG tablet Take 2 tablets (80 mg total) by mouth bedtime.     [DISCONTINUED] omeprazole (PRILOSEC) 20 MG capsule Take 1 capsule (20 mg total) by mouth daily.     No current facility-administered medications on file prior to visit.      History   Smoking Status     Never Smoker   Smokeless Tobacco     Not on file     Social History     Social History Narrative     Patient Care Team:  Ladarius Soria MD as PCP - General  ROS  Full 10 system review including constitutional, respiratory, cardiac, gi, urinary, rheumatologic, neurologic, reproductive, dermatologic psychiatric is  performed (via questionnaire) and is negative       Objective  Physical Exam  Vitals:    10/11/17 1349   BP: 136/70   Pulse: 95   Resp: 20   SpO2: 100%   Weight: 165 lb (74.8 kg)   Height: 5' 5.75\" (1.67 m)     Patient is alert, oriented and in no distress.    Conjunctiva, lids appear normal.  Nares are normal bilaterally.    TMs are visualized bilaterally and appear normal    There is no adenopathy in the neck.  Oral cavity is without any notable lesion, " oropharynx appears normal without any erythema, exudate, petechia    Chest appears normal, auscultation reveals normal breath sounds, no wheezing, rales or rhonchi.  Exam unchanged after neb  Diagnostics  None, reviewed labs from February  Reviewed chest x-ray report from 2011 which was normal.  Please note: Voice recognition software was used in this dictation.  It may therefore contain typographical errors.

## 2021-06-14 NOTE — PROGRESS NOTES
Assessment/ Plan  Problem List Items Addressed This Visit        High    Essential hypertension - Primary (Chronic)     Blood pressure remains high.  Losartan 100 mg, dose increased from 50 mg.  We will add metoprolol XL 50 mg, follow-up 1 month         Relevant Medications    metoprolol succinate (TOPROL XL) 50 MG 24 hr tablet       Medium    Acute bilateral low back pain without sciatica     Has not scheduled for physical therapy yet, continues to have significant pain, difficulty getting up out of a chair.  Pledges to schedule for physical therapy.             Subjective  CC:  Chief Complaint   Patient presents with     Follow-up     HPI:  61-year-old with history of cerebral palsy and hypertension with proteinuria presents for follow-up on hypertension.    He was seen about 1 month ago, complaining of back pain, prescribed cyclobenzaprine and referred for physical therapy.  He confesses that he is not done physical therapy yet.    Does not check blood pressures on his own.  Indicates that he has been adherent with the losartan and does not have any problems taking this.    Patient also indicates that the soreness on the left side of his neck, the palpable area has improved, though not completely resolved.  PFSH:  Patient Active Problem List   Diagnosis     Unspecified glaucoma     Neuritis     Essential hypertension     Osteoarthritis Of The Knee     Cerebral Palsy     Left Ventricular Hypertrophy     Esophageal reflux     Aortic Sclerosis     Benign Adenomatous Polyp Of The Large Intestine     Diverticulosis     Hypercholesterolemia     Acute bilateral low back pain without sciatica     Proteinuria     Headache     Cough     Urinary frequency     Healthcare maintenance     Benign prostatic hyperplasia with lower urinary tract symptoms, symptom details unspecified     Daytime sleepiness     Cervical lymphadenopathy     Hyperglycemia     Stage 3a chronic kidney disease     Current medications reviewed as  follows:  Current Outpatient Medications on File Prior to Visit   Medication Sig     atorvastatin (LIPITOR) 20 MG tablet Take 1 tablet (20 mg total) by mouth at bedtime.     cyclobenzaprine (FLEXERIL) 5 MG tablet Take 1 tablet (5 mg total) by mouth 3 (three) times a day as needed for muscle spasms.     losartan (COZAAR) 100 MG tablet TAKE 1 TABLET(100 MG) BY MOUTH DAILY     potassium chloride (MICRO-K) 10 mEq CR capsule Take 1 capsule (10 mEq total) by mouth daily.     tamsulosin (FLOMAX) 0.4 mg cap TAKE 1 CAPSULE(0.4 MG) BY MOUTH DAILY AFTER BREAKFAST     calcium-magnesium-zinc Tab 1 tablet daily.     fluticasone (ALLERGY RELIEF, FLUTICASONE,) 50 mcg/actuation nasal spray 2 sprays each nostril once daily     HYDROcodone-acetaminophen 5-325 mg per tablet Take 1 tablet by mouth every 6 (six) hours as needed for pain.     loratadine (CLARITIN) 10 mg tablet Take 1 tablet (10 mg total) by mouth daily.     omeprazole (PRILOSEC) 20 MG capsule Take 1 capsule (20 mg total) by mouth daily.     timolol maleate (TIMOPTIC) 0.5 % ophthalmic solution Administer 1 drop to both eyes daily.     No current facility-administered medications on file prior to visit.      Social History     Tobacco Use   Smoking Status Never Smoker   Smokeless Tobacco Never Used     Social History     Social History Narrative     Not on file     Patient Care Team:  Ladarius Soria MD as PCP - General  Ladarius Soria MD as Assigned PCP        Objective  Physical Exam  Vitals:    01/27/21 1704   BP: (!) 195/92   Patient Site: Right Arm   Patient Position: Sitting   Cuff Size: Adult Regular   Pulse: (!) 105   Resp: 16   Temp: 98.2  F (36.8  C)   TempSrc: Temporal     There is no height or weight on file to calculate BMI.  I do not feel any palpable lymph nodes on the neck, no mass, normal laryngeal cartilage.,   Cardiac exam normal, chest is clear to auscultation.  Diagnostics:   None      Please note: Voice recognition software was used in  this dictation.  It may therefore contain typographical errors.

## 2021-06-14 NOTE — PROGRESS NOTES
Assessment/ Plan    Problem List Items Addressed This Visit        High    Essential hypertension - Primary (Chronic)     Etiology unclear, check BMP.  Recommend recheck urine but unable to urinate.  I do not want to add a diuretic at this point because of history of hypokalemia.  Will follow, treat elevated blood pressure, follow-up 3 weeks for elevated blood pressure, check UA at that time patient does have a history of albuminuria         Relevant Medications    losartan (COZAAR) 100 MG tablet    Other Relevant Orders    Basic Metabolic Panel    Hemoglobin (Completed)    LDL Cholesterol, Direct    Hypercholesterolemia (Chronic)     On atorvastatin, 20 mg, check direct LDL and ALT            Medium    Acute bilateral low back pain without sciatica     No red flags but patient exquisitely tenderness with a lot of spasm.  Had plan to do x-ray today but x-ray closed by the time this was done.  Consider upon follow-up for hypertension.  Tylenol, physical therapy, muscle relaxer.         Relevant Medications    cyclobenzaprine (FLEXERIL) 5 MG tablet    Other Relevant Orders    Ambulatory referral to PT/OT       Unprioritized    Proteinuria     Recheck UA/microalbumin upon follow-up.  Possibly related to swelling.  Increase losartan dose to treat blood pressure         Relevant Orders    Basic Metabolic Panel    Hemoglobin (Completed)    Cervical lymphadenopathy     Mobile, smooth, less than 1 cm, not pathologic, 3 weeks duration.  Follow 3-4 more weeks for resolution.  Work-up if not resolving upon follow-up         Hyperglycemia     Check A1c         Relevant Orders    Glycosylated Hemoglobin A1c (Completed)    Stage 3a chronic kidney disease     BMP, hemoglobin ordered, vitamin D check rejected/not covered even for chronic kidney disease.  Will forego that for now.  Losartan.  Check microalbumin upon follow-up         Relevant Orders    Basic Metabolic Panel    Hemoglobin (Completed)      Other Visit Diagnoses      Lower extremity edema        Possibly related to proteinuria/chronic kidney disease, await labs, follow-up for UA which he was unable to leave today.          Subjective  CC:  Chief Complaint   Patient presents with     Back Pain     HPI:  Nodule   Narrative patient noticed a lump on the outside of his neck, no recent URI symptoms  --------------  Location- R side of neck  Duration/ onset- 3 weeks  Enlarging? No, but varies in size  Painful?  no  Associated symptoms? no    Back pain  ---------------------  Duration/timing- 5 months, progressing- really hurts  Location/ radiation- center of low back- initially thought it was due to diverticultis  Quality/Severity-severe  Context/modifying factors-any sort of movement, it seems especially when he stands up shaking long underwear, moving in certain ways  Red flags- progressive weakness, weight loss/ fever, night-time awakening?-No  Back pain history  previous evaluation, treatment, imaging? - yes  Comments-none    Legs swelling last few months.  Painless, fairly mild  Patient with history of hypertension, takes losartan 50 mg, does not check blood pressure regularly.  Also on potassium.  PFSH:  Patient Active Problem List   Diagnosis     Unspecified glaucoma     Neuritis     Essential hypertension     Osteoarthritis Of The Knee     Cerebral Palsy     Left Ventricular Hypertrophy     Esophageal reflux     Aortic Sclerosis     Benign Adenomatous Polyp Of The Large Intestine     Diverticulosis     Hypercholesterolemia     Low back pain     Proteinuria     Headache     Cough     Urinary frequency     Healthcare maintenance     Benign prostatic hyperplasia with lower urinary tract symptoms, symptom details unspecified     Daytime sleepiness     Cervical lymphadenopathy     Hyperglycemia     Stage 3a chronic kidney disease     Current medications reviewed as follows:  Current Outpatient Medications on File Prior to Visit   Medication Sig     atorvastatin (LIPITOR) 20 MG  "tablet Take 1 tablet (20 mg total) by mouth at bedtime.     calcium-magnesium-zinc Tab 1 tablet daily.     fluticasone (ALLERGY RELIEF, FLUTICASONE,) 50 mcg/actuation nasal spray 2 sprays each nostril once daily     HYDROcodone-acetaminophen 5-325 mg per tablet Take 1 tablet by mouth every 6 (six) hours as needed for pain.     loratadine (CLARITIN) 10 mg tablet Take 1 tablet (10 mg total) by mouth daily.     omeprazole (PRILOSEC) 20 MG capsule Take 1 capsule (20 mg total) by mouth daily.     potassium chloride (MICRO-K) 10 mEq CR capsule Take 1 capsule (10 mEq total) by mouth daily.     tamsulosin (FLOMAX) 0.4 mg cap TAKE 1 CAPSULE(0.4 MG) BY MOUTH DAILY AFTER BREAKFAST     timolol maleate (TIMOPTIC) 0.5 % ophthalmic solution Administer 1 drop to both eyes daily.     [DISCONTINUED] losartan (COZAAR) 50 MG tablet Take 1 tablet (50 mg total) by mouth daily.     No current facility-administered medications on file prior to visit.      Social History     Tobacco Use   Smoking Status Never Smoker   Smokeless Tobacco Never Used     Social History     Social History Narrative     Not on file     Patient Care Team:  Ladarius Soria MD as PCP - General  Ladarius Soria MD as Assigned PCP  ROS  Full 10 system review including constitutional, respiratory, cardiac, gi, urinary, rheumatologic, neurologic, reproductive, dermatologic psychiatric is  performed  and is otherwise negative         Objective  Physical Exam  Vitals:    12/30/20 1710   BP: (!) 181/83   Patient Site: Right Arm   Patient Position: Sitting   Cuff Size: Adult Regular   Pulse: (!) 111   Resp: 17   Temp: 98.3  F (36.8  C)   TempSrc: Temporal   Weight: 170 lb (77.1 kg)   Height: 5' 4.75\" (1.645 m)     Body mass index is 28.51 kg/m .  Gen- alert, oriented/ appropriately responsive  HEENT- normal cephalic, atraumatic.   Chest- Normal inspiration and expiration.    Clear to ascultation.    No chest wall deformity or scar.  CV- Heart regular rate and " rhythm  normal tones, no murmurs   No gallops or rubs.  Ext- appear well perfused, no edema  Skin- warm and dry,   no visualized rash  Spinous process tenderness on back, some low back spasm.  Unable to do x-ray today.  Consider this upon follow-up  Diagnostics:   Pending      Please note: Voice recognition software was used in this dictation.  It may therefore contain typographical errors.

## 2021-06-15 NOTE — PROGRESS NOTES
Assessment/ Plan  Problem List Items Addressed This Visit        High    Essential hypertension (Chronic)     Hypertension is poorly controlled today but as below has been good coming into this and good at home  BP Readings from Last 3 Encounters:   02/08/18 142/82   01/15/18 108/62   10/11/17 136/70     Comment: We will continue to follow  Current antihypertensives lisinopril 20 mg, hydrochlorothiazide 25  Plan: Follow-up 3 months, continue current medication                Unprioritized    Proteinuria     Recheck protein creatinine ratio today         Relevant Orders    Microalbumin, Random Urine      Other Visit Diagnoses     Headache    -  Primary    Trigger points is placed where pimple formed on scalp.  Now pretty well resorbed but still pain.  Will try antibiotic treatment.  If ineffective, consider predn    Pimples        As above, cephalexin        Subjective  CC:  Chief Complaint   Patient presents with     Headache     HPI:  Headache  Duration-began around the first of the year.  Started with a pimple or swelling in his occipital region of his head.  This did not drain but went down and is left with the pain which is been increasing lately.  Is been using acetaminophen, 3 pills first thing in the morning followed by 2 every 6 hours after that.  Discussed that this is too much and could cause rebound headache.  He understands and will cut back.  Location- R posterior  Severity/quality-dull- severe- feels like initial impact from a fall  Associated symptoms-no  Timing/context-pretty constant  Things that make pain better?  He says nothing, Tylenol must help a little    Nausea/vomiting?  No  Photophobia?  No  No comfortable way to sleep  3 acetominophen q am, then 2 q 6   Red flag symptoms; overnight awakening, worsening with valsalva or orthostatic change, change in behavior or mood, gradually worsening pattern?  Denies    HTN Follow-up  Narrative/ comments: Been taking blood pressure medication,  BP meds  lisinopril 20, hydrochlorothiazide 25  BP Readings from Last 3 Encounters:   02/08/18 164/70   01/15/18 108/62   10/11/17 136/70     BP readings outside of clinic?  Occasionally and has been good  Comments on lifestyle factors: Did not discuss at length, previously no problem with alcohol  Results for orders placed or performed in visit on 01/15/18   Basic Metabolic Panel   Result Value Ref Range    Sodium 141 136 - 145 mmol/L    Potassium 3.7 3.5 - 5.0 mmol/L    Chloride 98 98 - 107 mmol/L    CO2 30 22 - 31 mmol/L    Anion Gap, Calculation 13 5 - 18 mmol/L    Glucose 87 70 - 125 mg/dL    Calcium 9.3 8.5 - 10.5 mg/dL    BUN 17 8 - 22 mg/dL    Creatinine 0.97 0.70 - 1.30 mg/dL    GFR MDRD Af Amer >60 >60 mL/min/1.73m2    GFR MDRD Non Af Amer >60 >60 mL/min/1.73m2         PFSH:  Patient Active Problem List   Diagnosis     Glaucoma     Neuritis     Essential hypertension     Osteoarthritis Of The Knee     Cerebral Palsy     Left Ventricular Hypertrophy     Esophageal reflux     Aortic Sclerosis     Benign Adenomatous Polyp Of The Large Intestine     Diverticulosis     Hypercholesterolemia     Low back pain     Proteinuria     Current medications reviewed as follows:  Current Outpatient Prescriptions on File Prior to Visit   Medication Sig     ASCORBATE CALCIUM (VITAMIN C ORAL) 1 tablet daily. TABS     calcium-magnesium-zinc Tab 1 tablet daily.     hydroCHLOROthiazide (HYDRODIURIL) 25 MG tablet TAKE ONE TABLET BY MOUTH DAILY     ibuprofen (ADVIL,MOTRIN) 600 MG tablet 600 mg PO TID for 5 days scheduled then TID PRN     lisinopril (PRINIVIL,ZESTRIL) 20 MG tablet Take 1 tablet (20 mg total) by mouth daily.     loratadine (CLARITIN) 10 mg tablet Take 1 tablet (10 mg total) by mouth daily.     lovastatin (MEVACOR) 40 MG tablet Take 2 tablets (80 mg total) by mouth at bedtime.     omeprazole (PRILOSEC) 20 MG capsule Take 1 capsule (20 mg total) by mouth daily.     potassium chloride (MICRO-K) 10 mEq CR capsule Take 1 capsule  "(10 mEq total) by mouth daily.     timolol maleate (TIMOPTIC) 0.5 % ophthalmic solution Administer 1 drop to both eyes daily.     No current facility-administered medications on file prior to visit.      History   Smoking Status     Never Smoker   Smokeless Tobacco     Never Used     Social History     Social History Narrative     Patient Care Team:  Ladarius Soria MD as PCP - General  [facility]  Full 10 system review including constitutional, respiratory, cardiac, gi, urinary, rheumatologic, neurologic, reproductive, dermatologic psychiatric is  performed (via questionnaire) and is otherwise negative         Objective  Physical Exam  Vitals:    02/08/18 1602 02/08/18 1637   BP: 164/70 142/82   Pulse: 73    Resp: 24    SpO2: 95%    Weight: 164 lb (74.4 kg)    Height: 5' 5.75\" (1.67 m)      Body mass index is 26.67 kg/(m^2).  Scalp is pretty unremarkable, there is one area of slight erythema and slight induration that triggers his pain.  Otherwise renal nerves are tested and are normal, skin palpated and is normal.  Chest clear, cardiovascular exam normal.  Diagnostics:   Microalbumin is pending      Please note: Voice recognition software was used in this dictation.  It may therefore contain typographical errors.      "

## 2021-06-15 NOTE — PROGRESS NOTES
Assessment/ Plan  Problem List Items Addressed This Visit        High    Essential hypertension (Chronic)     Well-controlled, on lisinopril 20, hydrochlorothiazide 25, potassium.  Check basic metabolic profile.  Also urine protein.           Relevant Orders    Microalbumin, Random Urine    Basic Metabolic Panel    ALT (SGPT)    Lipid Cascade       Unprioritized    Proteinuria     Protein creatinine ratio 600 last year.  Need follow-up.  Unable to leave urine today.  On lisinopril         Relevant Orders    Microalbumin, Random Urine      Other Visit Diagnoses     Cough    -  Primary    Likely post viral.  Paroxysms leading to nausea.  Using over-the-counter cough medicine.  Printed prescription for prednisone to use if not improving    Relevant Orders    XR Chest 2 Views (Completed)    Nausea        Related to coughing, will focus on treatment of that, see above    Relevant Orders    XR Chest 2 Views (Completed)        Subjective  CC:  Chief Complaint   Patient presents with     feeling sick     HPI:  Nausea   Narrative-  Spells since new years holiday, not vomiting-comes after jags of coughing  ___________________________  Notes  Duration/ Timing/ Frequency/bieaiup2lgime- no vomiting  Abdominal Pain? no  Diarrhea? no  Other associated symptoms? Nagging cough  Severity?-Quite severe but again has not vomited to the  Trigger/ anything make it worse? Happens 20 minutes after meal and lasts 20-30 minutes      Cough  Narrative productive cough, seemingly stable, intermittent, comes in spasms,  -----------------------------------------  Duration:  2 weeks  Associated URI symptoms?  No   Productive?  Small amount of phlegm  SOB?  no  Severity   spells    2 months  Blemish on back of head  Initially swelling- not painfull      HTN Follow-up  Narrative/ comments: Adherence to medication regimen.  Last time patient was seen, we loaded up on protein and indeed he had an estimated 600 g a day.  Has not followed up  since.  Adherent with antihypertensives?  Lisinopril 20, hydrochlorothiazide 25 (see assessment section for drugs)  Unfortunately patient unable to leave urine today  PFSH:  Patient Active Problem List   Diagnosis     Glaucoma     Neuritis     Essential hypertension     Osteoarthritis Of The Knee     Cerebral Palsy     Left Ventricular Hypertrophy     Esophageal reflux     Aortic Sclerosis     Benign Adenomatous Polyp Of The Large Intestine     Diverticulosis     Hypercholesterolemia     Low back pain     Proteinuria     Current medications reviewed as follows:  Current Outpatient Prescriptions on File Prior to Visit   Medication Sig     ASCORBATE CALCIUM (VITAMIN C ORAL) 1 tablet daily. TABS     calcium-magnesium-zinc Tab 1 tablet daily.     hydroCHLOROthiazide (HYDRODIURIL) 25 MG tablet TAKE ONE TABLET BY MOUTH DAILY     ibuprofen (ADVIL,MOTRIN) 600 MG tablet 600 mg PO TID for 5 days scheduled then TID PRN     lisinopril (PRINIVIL,ZESTRIL) 20 MG tablet Take 1 tablet (20 mg total) by mouth daily.     loratadine (CLARITIN) 10 mg tablet Take 1 tablet (10 mg total) by mouth daily.     lovastatin (MEVACOR) 40 MG tablet Take 2 tablets (80 mg total) by mouth at bedtime.     omeprazole (PRILOSEC) 20 MG capsule Take 1 capsule (20 mg total) by mouth daily.     potassium chloride (MICRO-K) 10 mEq CR capsule Take 1 capsule (10 mEq total) by mouth daily.     timolol maleate (TIMOPTIC) 0.5 % ophthalmic solution Administer 1 drop to both eyes daily.     No current facility-administered medications on file prior to visit.      History   Smoking Status     Never Smoker   Smokeless Tobacco     Never Used     Social History     Social History Narrative     Patient Care Team:  Ladarius Soria MD as PCP - General  [facility]  Full 10 system review including constitutional, respiratory, cardiac, gi, urinary, rheumatologic, neurologic, reproductive, dermatologic psychiatric is  performed  and is negative except as listed  "above      Objective  Physical Exam  Vitals:    01/15/18 1556   BP: 108/62   Pulse: 84   Resp: 20   Weight: 164 lb (74.4 kg)   Height: 5' 5.75\" (1.67 m)     Body mass index is 26.67 kg/(m^2).  Patient is alert, oriented and in no distress.    Conjunctiva, lids appear normal.  Nares are normal bilaterally.    TMs are visualized bilaterally and appear normal    There is no adenopathy in the neck.  Oral cavity is without any notable lesion, oropharynx appears normal without any erythema, exudate, petechia    Chest appears normal, auscultation reveals normal breath sounds, no wheezing, rales or rhonchi.  Neck exam reveals regular rate and rhythm normal tones no murmurs, gallops, rubs  Abdomen is soft and nontender  Diagnostics:         Please note: Voice recognition software was used in this dictation.  It may therefore contain typographical errors.            "

## 2021-06-16 ENCOUNTER — OFFICE VISIT - HEALTHEAST (OUTPATIENT)
Dept: FAMILY MEDICINE | Facility: CLINIC | Age: 61
End: 2021-06-16

## 2021-06-16 ENCOUNTER — COMMUNICATION - HEALTHEAST (OUTPATIENT)
Dept: FAMILY MEDICINE | Facility: CLINIC | Age: 61
End: 2021-06-16

## 2021-06-16 DIAGNOSIS — R40.0 DAYTIME SLEEPINESS: ICD-10-CM

## 2021-06-16 DIAGNOSIS — R60.9 EDEMA, UNSPECIFIED TYPE: ICD-10-CM

## 2021-06-16 DIAGNOSIS — I10 ESSENTIAL HYPERTENSION: ICD-10-CM

## 2021-06-16 DIAGNOSIS — N40.1 BENIGN PROSTATIC HYPERPLASIA WITH LOWER URINARY TRACT SYMPTOMS, SYMPTOM DETAILS UNSPECIFIED: ICD-10-CM

## 2021-06-16 DIAGNOSIS — I51.7 CARDIOMEGALY: ICD-10-CM

## 2021-06-16 PROBLEM — R73.9 HYPERGLYCEMIA: Status: ACTIVE | Noted: 2020-12-30

## 2021-06-16 PROBLEM — R80.9 PROTEINURIA: Status: ACTIVE | Noted: 2018-01-15

## 2021-06-16 PROBLEM — R59.0 CERVICAL LYMPHADENOPATHY: Status: ACTIVE | Noted: 2020-12-30

## 2021-06-16 PROBLEM — N18.31 STAGE 3A CHRONIC KIDNEY DISEASE (H): Status: ACTIVE | Noted: 2020-12-30

## 2021-06-16 PROBLEM — Z00.00 HEALTHCARE MAINTENANCE: Status: ACTIVE | Noted: 2019-06-18

## 2021-06-16 PROBLEM — R05.9 COUGH: Status: ACTIVE | Noted: 2018-03-07

## 2021-06-16 PROBLEM — R51.9 HEADACHE: Status: ACTIVE | Noted: 2018-03-07

## 2021-06-16 PROBLEM — R35.0 URINARY FREQUENCY: Status: ACTIVE | Noted: 2018-08-15

## 2021-06-16 RX ORDER — FUROSEMIDE 20 MG
TABLET ORAL
Qty: 90 TABLET | Refills: 0 | Status: SHIPPED | OUTPATIENT
Start: 2021-06-16 | End: 2021-10-04

## 2021-06-16 RX ORDER — FINASTERIDE 5 MG/1
TABLET, FILM COATED ORAL
Qty: 90 TABLET | Refills: 0 | Status: SHIPPED | OUTPATIENT
Start: 2021-06-16 | End: 2021-10-04

## 2021-06-16 RX ORDER — METOPROLOL SUCCINATE 100 MG/1
TABLET, EXTENDED RELEASE ORAL
Qty: 90 TABLET | Refills: 0 | Status: SHIPPED | OUTPATIENT
Start: 2021-06-16 | End: 2021-07-23

## 2021-06-16 NOTE — ASSESSMENT & PLAN NOTE
Noted on echocardiogram again, probably some diastolic dysfunction.  Discussed importance of good blood pressure control.

## 2021-06-16 NOTE — PROGRESS NOTES
Assessment/ Plan    Problem List Items Addressed This Visit        High    Essential hypertension - Primary (Chronic)     Uncontrolled blood pressure and lower extremity edema.  Will add chlorothiazide for both problems.  Follow-up 3 weeks.         Relevant Medications    hydroCHLOROthiazide (HYDRODIURIL) 25 MG tablet    Other Relevant Orders    Comprehensive Metabolic Panel    BNP(B-type Natriuretic Peptide)       Medium    Acute bilateral low back pain without sciatica     Usually more chronic pain at this point.  Still has not scheduled physical therapy.  Will refer once more         Relevant Orders    Ambulatory referral to PT/OT       Unprioritized    Edema, unspecified type     Unclear cause, fairly inactive muscle/sedentary.  Also proteinuria.  Check BNP and CMP.  No anasarca.    Start/restart hydrochlorothiazide for blood pressure and edema, Ace wrap lower extremities, follow-up 3 weeks.  Measure for compression stockings.         Relevant Medications    hydroCHLOROthiazide (HYDRODIURIL) 25 MG tablet    Other Relevant Orders    Comprehensive Metabolic Panel    BNP(B-type Natriuretic Peptide)      Other Visit Diagnoses     Hypertensive heart and chronic kidney disease with heart failure and stage 1 through stage 4 chronic kidney disease, or unspecified chronic kidney disease (H)         Relevant Medications    hydroCHLOROthiazide (HYDRODIURIL) 25 MG tablet    Other Relevant Orders    BNP(B-type Natriuretic Peptide)          Subjective  CC:  Chief Complaint   Patient presents with     Edema     HPI:  He complains about 2 months of increasing lower extremity edema.    Continues to have low back pain.  Taking metoprolol in addition to losartan for blood pressure.  Previously was on hydrochlorothiazide, reviewed old notes and this was held because of frequent urination.  PFSH:  Patient Active Problem List   Diagnosis     Unspecified glaucoma     Neuritis     Essential hypertension     Osteoarthritis Of The Knee      Cerebral Palsy     Left Ventricular Hypertrophy     Esophageal reflux     Aortic Sclerosis     Benign Adenomatous Polyp Of The Large Intestine     Diverticulosis     Hypercholesterolemia     Acute bilateral low back pain without sciatica     Proteinuria     Headache     Cough     Urinary frequency     Healthcare maintenance     Benign prostatic hyperplasia with lower urinary tract symptoms, symptom details unspecified     Daytime sleepiness     Cervical lymphadenopathy     Hyperglycemia     Stage 3a chronic kidney disease     Edema, unspecified type     Current medications reviewed as follows:  Current Outpatient Medications on File Prior to Visit   Medication Sig     ascorbic acid, vitamin C, (VITAMIN C) 1000 MG tablet Take 1,000 mg by mouth daily.     atorvastatin (LIPITOR) 20 MG tablet Take 1 tablet (20 mg total) by mouth at bedtime.     losartan (COZAAR) 100 MG tablet TAKE 1 TABLET(100 MG) BY MOUTH DAILY     metoprolol succinate (TOPROL-XL) 50 MG 24 hr tablet TAKE 1 TABLET(50 MG) BY MOUTH DAILY     potassium chloride (MICRO-K) 10 mEq CR capsule Take 1 capsule (10 mEq total) by mouth daily.     tamsulosin (FLOMAX) 0.4 mg cap TAKE 1 CAPSULE(0.4 MG) BY MOUTH DAILY AFTER BREAKFAST     calcium-magnesium-zinc Tab 1 tablet daily.     cyclobenzaprine (FLEXERIL) 5 MG tablet Take 1 tablet (5 mg total) by mouth 3 (three) times a day as needed for muscle spasms.     fluticasone (ALLERGY RELIEF, FLUTICASONE,) 50 mcg/actuation nasal spray 2 sprays each nostril once daily     HYDROcodone-acetaminophen 5-325 mg per tablet Take 1 tablet by mouth every 6 (six) hours as needed for pain.     loratadine (CLARITIN) 10 mg tablet Take 1 tablet (10 mg total) by mouth daily.     omeprazole (PRILOSEC) 20 MG capsule Take 1 capsule (20 mg total) by mouth daily.     timolol maleate (TIMOPTIC) 0.5 % ophthalmic solution Administer 1 drop to both eyes daily.     No current facility-administered medications on file prior to visit.       Social History     Tobacco Use   Smoking Status Never Smoker   Smokeless Tobacco Never Used     Social History     Social History Narrative     Not on file     Patient Care Team:  Ladarius Soria MD as PCP - General  Ladarius Soria MD as Assigned PCP  [facility]  As above      Objective  Physical Exam  Vitals:    03/31/21 1605   BP: 180/82   Patient Site: Right Arm   Patient Position: Sitting   Cuff Size: Adult Regular   Pulse: 88   Resp: 12   Temp: 98.4  F (36.9  C)   TempSrc: Temporal     There is no height or weight on file to calculate BMI.  Plus lower extremity edema to the knee.  Chest clear, no elevated JVP.  No gallop murmurs.  Diagnostics:   Pending      Please note: Voice recognition software was used in this dictation.  It may therefore contain typographical errors.

## 2021-06-16 NOTE — PROGRESS NOTES
[facility] Certification Request    March 23, 2018      Patient: Bright Lockett  MR Number: [id number]  YOB: 1960  Date of Visit: 3/23/2018      Dear Dr. Ladarius Soria:    Thank you for this referral.   We are seeing Bright Lockett for Physical Therapy for headaches.    Medicare and/or Medicaid requires physician review and approval of the treatment plan. Please review the plan of care and verify that you agree with the therapy plan of care by co-signing this note.      Plan of Care  Authorization / Certification Start Date: 03/23/18  Authorization / Certification End Date: 06/21/18  Authorization / Certification Number of Visits: 10  Communication with: Referral Source  Patient Related Instruction: Nature of Condition;Treatment plan and rationale;Self Care instruction;Basis of treatment;Posture;Next steps;Expected outcome  Times per Week: 2  Number of Weeks: 4-5  Number of Visits: up to 10  Therapeutic Exercise: Stretching;Strengthening  Neuromuscular Reeducation: posture;kinesio tape  Manual Therapy: soft tissue mobilization;myofascial release;joint mobilization    Goals:  Pt. will be independent with home exercise program in : 4 weeks  Pt. will report decreased intensity, frequency of : Headache;in 4 weeks;Comment  Comment:: pain intensity <= 2/10 and <= 2 days/week  Pt. will have improved quality of sleep: waking less times/night;in 4 weeks;Comment  Comment:: back to his regular sleep schedule  No Data Recorded      If you have any questions or concerns, please don't hesitate to call.    Sincerely,      Pantera Goldman, PT        Physician recommendation:     ___ Follow therapist's recommendation        ___ Modify therapy      *Physician co-signature indicates they certify the need for these services furnished within this plan and while under their care.    [facility]   Initial Evaluation    Patient Name: Bright Lockett  Date of evaluation: 3/23/2018  PRECAUTIONS:  cerebral palsy, aortic sclerosis  Referral Diagnosis: Muscle tension headache  Referring provider: Ladarius Soria, *  Visit Diagnosis:     ICD-10-CM    1. Tension headache G44.209    2. Decreased ROM of neck R29.898    3. Abnormal posture R29.3        Assessment:      Pt. is appropriate for skilled PT intervention as outlined in the Plan of Care (POC).  Pt. is a good candidate for skilled PT services to improve pain levels and function.  Goals and POC established in collaboration with the patient.  Signs/sx consistent with acute headaches, left>right-sided, most likely tension-related.  Pt with good tolerance for SOR and initiation of HEP, although denies change in intensity of pain post treatment session.    Goals:  Pt. will be independent with home exercise program in : 4 weeks  Pt. will report decreased intensity, frequency of : Headache;in 4 weeks;Comment  Comment:: pain intensity <= 2/10 and <= 2 days/week  Pt. will have improved quality of sleep: waking less times/night;in 4 weeks;Comment  Comment:: back to his regular sleep schedule  No Data Recorded    Patient's expectations/goals are realistic.    Barriers to Learning or Achieving Goals:  Co-morbidities or other medical factors.  CP       Plan / Patient Instructions:        Plan of Care:   Authorization / Certification Start Date: 03/23/18  Authorization / Certification End Date: 06/21/18  Authorization / Certification Number of Visits: 10  Communication with: Referral Source  Patient Related Instruction: Nature of Condition;Treatment plan and rationale;Self Care instruction;Basis of treatment;Posture;Next steps;Expected outcome  Times per Week: 2  Number of Weeks: 4-5  Number of Visits: up to 10  Therapeutic Exercise: Stretching;Strengthening  Neuromuscular Reeducation: posture;kinesio tape  Manual Therapy: soft tissue mobilization;myofascial release;joint mobilization    Plan for next visit: Review HEP, and progressing neck/scap retraction ex as  "able. Continue SOR.     Subjective:       History of Present Illness:    Bright is a 58 y.o. male who presents to therapy today with complaints of headaches, starting primarily in the occipital region, extending to the bridge of the nose and bilat temples.  Over time, seems to be more predominant and worse on the left side.  Has done some medication shifting; notes HAs to be getting better since a nasal medication.   Onset: 2018.  Duration: Constant  Sleep: Reports difficulty with getting to sleep, along with staying asleep at times due to pain.    PER PCP from visit on 3/7/18: \"9 weeks, at times severe, no clear pattern though most symptoms consistent with muscle tension headache, no red flags.  Patient with history of cerebral palsy.\"    Pt seeks PT to help get rid of the HAs.    Pain Ratin  Pain rating at best: 2  Pain rating at worst: 10  Pain description: \"dull roar\" but can fluctuate to very sharp     Objective:      Patient Outcome Measures :    Not completed    PER EMR:  HEAD MRI WITHOUT AND WITH IV CONTRAST  3/13/2018 6:52 PM     INDICATION: 9 weeks of headache  TECHNIQUE: Head MRI without and with intravenous contrast.  CONTRAST: Gadavist 7ml  COMPARISON: 2007 brain MRI.     FINDINGS: No finding for acute infarct. Susceptibility artifact in the left lateral vivian suggest a focus of hemosiderin deposition from remote chronic microhemorrhage. Additional small foci of presumed chronic hemosiderin deposition in the left corona   radiata, with one focus anteriorly and one focus posteriorly.  No abnormally enhancing mass. Mild scattered chronic small vessel ischemic changes. Mild diffuse intraparenchymal volume loss. Ventricular size is in keeping with this volume loss. Right   globe prosthesis. Unremarkable left orbit. Mild mucosal thickening at the floor of the right maxillary sinus. Small polyp versus mucous retention cyst in the left maxillary sinus.     IMPRESSION:   CONCLUSION:  1.  No " finding for acute infarct, acute intracranial hemorrhage, or abnormally enhancing mass.     2.  Small focus of presumed chronic hemosiderin deposition in the lateral left vivian, with 2 additional small foci of presumed hemosiderin deposition in the left corona radiata.     3.  Mild diffuse intraparenchymal volume loss with mild scattered chronic small vessel ischemic change.    Examination  1. Tension headache     2. Decreased ROM of neck     3. Abnormal posture       Involved side: left>right  Posture Observation:      General sitting posture is  fair.  General standing posture is fair.  Cervical:  Mild forward head  Shoulder/Thoracic complex: Moderately increased upper thoracic kyphosis  Mildly increased mid thoracic kyphosis    Cervical ROM  Date:      *Indicate scale AROM AROM AROM   Cervical Flexion 50     Cervical Extension 30      Right Left Right Left Right Left   Cervical Sidebending 25 25       Cervical Rotation 55 45       Cervical Protraction      Cervical Retraction      Thoracic Flexion      Thoracic Extension      Thoracic Sidebending         Thoracic Rotation           Increased muscle spasm present L suboccipitals. Denies TTP L scalenes, SCM, or paraspinals.      Treatment Today     TREATMENT MINUTES COMMENTS   Evaluation 20 Cervical   Self-care/ Home management     Manual therapy 10 Supine SOR, 2x5 min intervals   Neuromuscular Re-education     Therapeutic Activity     Therapeutic Exercises 15 -Discussed therapy diagnosis, prognosis, POC, relevant anatomy and basis for tx.  -Reviewed and performed HEP with handouts provided.   Gait training     Modality__________________                Total 45    Blank areas are intentional and mean the treatment did not include these items.            PT Evaluation Code: (Please list factors)  Patient History/Comorbidities: CP  Examination: Cervical/HA  Clinical Presentation: Stable  Clinical Decision Making: Low    Patient History/  Comorbidities  Examination  (body structures and functions, activity limitations, and/or participation restrictions) Clinical Presentation Clinical Decision Making (Complexity)   No documented Comorbidities or personal factors 1-2 Elements Stable and/or uncomplicated Low   1-2 documented comorbidities or personal factor 3 Elements Evolving clinical presentation with changing characteristics Moderate   3-4 documented comorbidities or personal factors 4 or more Unstable and unpredictable High Pantera Goldman  3/23/2018  11:03 AM

## 2021-06-16 NOTE — ASSESSMENT & PLAN NOTE
Current antihypertensives losartan 100, hydrochlorothiazide 25, metoprolol XL 50  Hypertension is poorly controlled  BP Readings from Last 3 Encounters:   06/16/21 (!) 189/96   05/28/21 (!) 180/93   03/31/21 180/82     Also with lower extremity edema, slightly elevated BNP, LVH , chronic kidney disease 3-day  Plan: Add furosemide 20, discontinue hydrochlorothiazide, increase metoprolol to 100, follow-up 1 month      Results for orders placed or performed in visit on 12/30/20   Basic Metabolic Panel   Result Value Ref Range    Sodium 143 136 - 145 mmol/L    Potassium 4.2 3.5 - 5.0 mmol/L    Chloride 105 98 - 107 mmol/L    CO2 27 22 - 31 mmol/L    Anion Gap, Calculation 11 5 - 18 mmol/L    Glucose 121 70 - 125 mg/dL    Calcium 9.0 8.5 - 10.5 mg/dL    BUN 24 (H) 8 - 22 mg/dL    Creatinine 0.99 0.70 - 1.30 mg/dL    GFR MDRD Af Amer >60 >60 mL/min/1.73m2    GFR MDRD Non Af Amer >60 >60 mL/min/1.73m2

## 2021-06-16 NOTE — TELEPHONE ENCOUNTER
Refill Approved    Rx renewed per Medication Renewal Policy. Medication was last renewed on 12/31/20.    [place], [facility] Triage/Med Refill 4/16/2021     Requested Prescriptions   Pending Prescriptions Disp Refills     losartan (COZAAR) 100 MG tablet 90 tablet 0     Sig: Take 1 tablet (100 mg total) by mouth daily.       Angiotensin Receptor Blocker Protocol Passed - 4/14/2021  4:07 PM        Passed - PCP or prescribing provider visit in past 12 months       Last office visit with prescriber/PCP: 3/31/2021 Ladarius Soria MD OR same dept: 3/31/2021 Ladarius Soria MD OR same specialty: 3/31/2021 Ladarius Soria MD  Last physical: Visit date not found Last MTM visit: Visit date not found   Next visit within 3 mo: Visit date not found  Next physical within 3 mo: Visit date not found  Prescriber OR PCP: Ladarius Soria MD  Last diagnosis associated with med order: 1. Essential hypertension  - losartan (COZAAR) 100 MG tablet; Take 1 tablet (100 mg total) by mouth daily.  Dispense: 90 tablet; Refill: 0    If protocol passes may refill for 12 months if within 3 months of last provider visit (or a total of 15 months).             Passed - Serum potassium within the past 12 months     Lab Results   Component Value Date    Potassium 4.1 03/31/2021             Passed - Blood pressure filed in past 12 months     BP Readings from Last 1 Encounters:   03/31/21 180/82             Passed - Serum creatinine within the past 12 months     Creatinine   Date Value Ref Range Status   03/31/2021 1.01 0.70 - 1.30 mg/dL Final

## 2021-06-16 NOTE — ASSESSMENT & PLAN NOTE
Frequent urination at night, unknown snoring, suggested referral to sleep medicine, patient declined for now

## 2021-06-16 NOTE — TELEPHONE ENCOUNTER
Requested Prescriptions     Pending Prescriptions Disp Refills     losartan (COZAAR) 100 MG tablet 90 tablet 0     Sig: Take 1 tablet (100 mg total) by mouth daily.

## 2021-06-16 NOTE — PROGRESS NOTES
[facility] Daily Progress     Patient Name: Bright Lockett  Date: 3/27/2018  Visit #: 2/10  PTA visit #:  1  Referral Diagnosis: tension HA  Referring provider: Ladarius Soria, *  Visit Diagnosis:     ICD-10-CM    1. Tension headache G44.209    2. Decreased ROM of neck R29.898    3. Abnormal posture R29.3          Assessment:     Cues for HEP  Complaint with HEP  Pt would benefit from continuing with skilled PT treatment      Goal Status: Ongoing  Pt. will be independent with home exercise program in : 4 weeks  Pt. will report decreased intensity, frequency of : Headache;in 4 weeks;Comment  Comment:: pain intensity <= 2/10 and <= 2 days/week  Pt. will have improved quality of sleep: waking less times/night;in 4 weeks;Comment  Comment:: back to his regular sleep schedule  No Data Recorded    Plan / Patient Education:     Continue POC  Progress ex as tolerated     Subjective:     Pain Ratin-2/10 intermittent  Tennis ball I tried for 20 mins caused pain/pressure discussed proper placement and shorter time  Sleeping 4 hour cycle instead of 12 now        Objective:     Cues for HEP/posture  Reviewed HEP  Added C-SB,and C-rot   Continued manual therapy per flow sheet    Treatment Today   3/27/2018    TREATMENT MINUTES COMMENTS   Evaluation     Self-care/ Home management     Manual therapy 10 SOR   Neuromuscular Re-education     Therapeutic Activity     Therapeutic Exercises 15 HEP per flow sheet/posture   Gait training     Modality__________________                Total 25    Blank areas are intentional and mean the treatment did not include these items.       Felipe Ayala  3/27/2018

## 2021-06-16 NOTE — TELEPHONE ENCOUNTER
Roommate stated that patient is not home and will have patient return call.    LMTCB #1 to see if patient was interested in getting the COVID-19 Vaccine at [facility] on 4/17 if patient hasn't already.    If patient got it, please notate this information down and re-route it to the provider pool. Thanks.    Name of vaccine:  Place of vaccination:  Date of 1st dose:  Date of 2nd dose:  Lot #: (patient may or may not have this)
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
## 2021-06-17 NOTE — PROGRESS NOTES
[facility] Daily Progress     Patient Name: Bright Lockett  Date: 5/21/2021  Visit #: 5/12 until 7/19/21  PTA visit #:  na  Referral Diagnosis: Acute bilateral low back pain without sciatica  Referring provider: Ladarius oSria, *  Visit Diagnosis:     ICD-10-CM    1. Acute bilateral low back pain without sciatica  M54.5    2. Generalized muscle weakness  M62.81    3. Decreased ROM of lumbar spine  M53.86    4. Abnormality of gait  R26.9    5. Cerebral palsy, unspecified type (H)  G80.9        Past Medical History:   Diagnosis Date     Aortic sclerosis      Back pain      Benign neoplasm of adenomatous polyp     of the large intestine     Carpal tunnel syndrome      Cerebral palsy (H)      Diverticulosis      Esophageal reflux      Glaucoma      Hyperlipidemia      Hypertension      Left ventricular hypertrophy      Leg weakness     left     Neuritis      Osteoarthritis of knee          Assessment:     HEP/POC compliance is  fair .  Response to Intervention Pt reporting increased intermittent ankle, knee and hip pains throughout this past week. He felt relief with manual therapy and kinesiotape which were continued today. Lumbar spine pain still limited his mobility.  Patient is benefitting from skilled physical therapy and is making steady progress toward functional goals.  Patient is appropriate to continue with skilled physical therapy intervention, as indicated by initial plan of care.    Goal Status:  Pt. will demonstrate/verbalize independence in self-management of condition in : 4 weeks  Pt. will be independent with home exercise program in : 4 weeks  Pt. will be able to walk : 2 blocks;on even surfaces;with less pain;for household mobility;with less difficulty;for community mobility;in 12 weeks (<3/10 pain)    Pt will: be able to go from sitting to standing with <3/10 pain and improved speed to improve QOL in 12 weeks.      Plan / Patient Education:     Continue with initial plan  "of care.  Progress with home program as tolerated. Progression of LE/hip/abdominal strengthening exercises, lumbar and hip stretching    Subjective:     Pain Ratin/10 pain - Low back pain     Pt reports issues with his back, ankle and knee at times this past week. Pt is not sure why his leg pain is worse recently. He does report the stretching last time was helpful. He has not been able to do the exercises as much as he would like this past week. The kinesiotape was on until last night with possible slight improvement.       Objective:   Pt having difficulty with supine to sit with increased back pain.   Decreased speed with transfers.   ModA needed for supine to sit  Sit <>stand: pt reports mostly normal speed but increased pain with achieving fully upright posture    Exercises:  Exercise #1: ab set with march 5 x 5 sec hold,  Comment #1: bridge with isometric hip add with ball - reduced to GS with hip add due to pain  Exercise #2: supine pilates arch with 4# , 10 reps  Comment #2: seated lumbar flexion stretch 3 x 10-20 sec holds  Exercise #3: seated hip abd with L3 band 10x  Comment #3: LTR x10  Exercise #4: SKTC hold 20 seconds bilaterally      Treatment Today     TREATMENT MINUTES COMMENTS   Evaluation     Self-care/ Home management     Manual therapy 32 In supine: PROM hip stretch ER, SKTC, piriformis stretch, abduction, flexion, HS stretch, ankle dorsiflexion bilaterally   Supine R and L leg distraction, grade II, 4 x 10-30 sec oscillations   Neuromuscular Re-education 4 K-Tape  Prior to application skin was cleaned with alcohol wipe  3 strips were applied in star pattern to lumbosacral juntion with mild stretch.  No stretch applied to 2\" ends of tape.   Pt was sitting in lumbar flexion during application.      Therapeutic Activity     Therapeutic Exercises 10 -see exercise flow sheet for date completed: verbal review of all HEP- focus on exercises he does not need assist    Exercises from previous " episodes of PT :   Seated: marching with L4 band at foot and pressing into band on way down 5x B, seated with L4 band around trunk at T12 with learning backwards and slowly returning to seated supine knee fall out 5x      PTRx: fvubrrdzaw   Gait training     Modality__________________                Total 46    Blank areas are intentional and mean the treatment did not include these items.       Leia Mayo, PT, DPT, CLT-MARLEEN  5/21/2021

## 2021-06-17 NOTE — PROGRESS NOTES
[facility] Daily Progress     Patient Name: Bright Lockett  Date: 5/7/2021  Visit #: 3/12 until 7/19/21  PTA visit #:  na  Referral Diagnosis: Acute bilateral low back pain without sciatica  Referring provider: Ladarius Soria, *  Visit Diagnosis:     ICD-10-CM    1. Acute bilateral low back pain without sciatica  M54.5    2. Generalized muscle weakness  M62.81    3. Decreased ROM of lumbar spine  M53.86    4. Abnormality of gait  R26.9    5. Cerebral palsy, unspecified type (H)  G80.9        Past Medical History:   Diagnosis Date     Aortic sclerosis      Back pain      Benign neoplasm of adenomatous polyp     of the large intestine     Carpal tunnel syndrome      Cerebral palsy (H)      Diverticulosis      Esophageal reflux      Glaucoma      Hyperlipidemia      Hypertension      Left ventricular hypertrophy      Leg weakness     left     Neuritis      Osteoarthritis of knee          Assessment:     HEP/POC compliance is  fair .  Response to Intervention Pt overall reporting things seem to be improving with his low back. He has been a little inconsistent with his HEP.  Patient is benefitting from skilled physical therapy and is making steady progress toward functional goals.  Patient is appropriate to continue with skilled physical therapy intervention, as indicated by initial plan of care.    Goal Status:  Pt. will demonstrate/verbalize independence in self-management of condition in : 4 weeks  Pt. will be independent with home exercise program in : 4 weeks  Pt. will be able to walk : 2 blocks;on even surfaces;with less pain;for household mobility;with less difficulty;for community mobility;in 12 weeks (<3/10 pain)    Pt will: be able to go from sitting to standing with <3/10 pain and improved speed to improve QOL in 12 weeks.      Plan / Patient Education:     Continue with initial plan of care.  Progress with home program as tolerated. Progression of LE/hip/abdominal strengthening  exercises, lumbar and hip stretching    Subjective:     Pain Ratin-1.5  Pt reports he was able to get out of bed in a different way this morning. He stood outside for 5-10 minutes and that increased his pain.       Objective:     Increased speed with sit<> stand transfer noted today    No increased pain with HEP    Exercises:  Exercise #1: ab set with march 5 x 5 sec hold,  Comment #1: bridge with isometric hip add with ball - reduced to GS with hip add due to pain  Exercise #2: supine pilates arch with 4# , 10 reps  Comment #2: seated lumbar flexion stretch 3 x 10-20 sec holds  Exercise #3: seated hip abd with L3 band 10x        Treatment Today     TREATMENT MINUTES COMMENTS   Evaluation     Self-care/ Home management     Manual therapy     Neuromuscular Re-education     Therapeutic Activity     Therapeutic Exercises 25 -see exercise flow sheet for date   Completed    Exercises from previous episodes of PT :   Seated: marching with L4 band at foot and pressing into band on way down 5x B, seated with L4 band around trunk at T12 with learning backwards and slowly returning to seated supine knee fall out 5x    Not in HEP: standing forward trunk lean with CGA with returning to upright posture for abdominal/back strengthening 5x     PTRx: fvubrrdzaw    Discussed pt could be appropriate for PCA services- pt is aware of how to reach out to determine if he would qualify at this time   Gait training     Modality__________________                Total 25    Blank areas are intentional and mean the treatment did not include these items.       Leia Mayo, PT, DPT, CLT-MARLEEN  2021

## 2021-06-17 NOTE — TELEPHONE ENCOUNTER
Reason for Call: Request for an order or referral:    Order or referral being requested: ultrasound of the patients heart  He is requesting this    Date needed: as soon as possible    Has the patient been seen by the PCP for this problem? YES and Not Applicable    Additional comments: patient is calling to say he needs an order for a ultrasound of his heart. He said he called radiology to schedule and they dont have an order.    Phone number Patient can be reached at:  Home number on file [phone] (home)    Best Time:  asap please    Can we leave a detailed message on this number?  No    Call taken on 5/4/2021 at 10:33 AM by Lay Landin

## 2021-06-17 NOTE — PROGRESS NOTES
[facility] Rehabilitation Daily Progress     Patient Name: Bright Lockett  Date: 5/14/2021  Visit #: 4/12 until 7/19/21  PTA visit #:  na  Referral Diagnosis: Acute bilateral low back pain without sciatica  Referring provider: Ladarius Soria, *  Visit Diagnosis:     ICD-10-CM    1. Acute bilateral low back pain without sciatica  M54.5    2. Generalized muscle weakness  M62.81    3. Decreased ROM of lumbar spine  M53.86    4. Abnormality of gait  R26.9    5. Cerebral palsy, unspecified type (H)  G80.9        Past Medical History:   Diagnosis Date     Aortic sclerosis      Back pain      Benign neoplasm of adenomatous polyp     of the large intestine     Carpal tunnel syndrome      Cerebral palsy (H)      Diverticulosis      Esophageal reflux      Glaucoma      Hyperlipidemia      Hypertension      Left ventricular hypertrophy      Leg weakness     left     Neuritis      Osteoarthritis of knee          Assessment:     HEP/POC compliance is  fair .  Response to Intervention Pt overall reporting things seem to be improving with his low back. He has been a little inconsistent with his HEP.  Patient is benefitting from skilled physical therapy and is making steady progress toward functional goals.  Patient is appropriate to continue with skilled physical therapy intervention, as indicated by initial plan of care.    Goal Status:  Pt. will demonstrate/verbalize independence in self-management of condition in : 4 weeks  Pt. will be independent with home exercise program in : 4 weeks  Pt. will be able to walk : 2 blocks;on even surfaces;with less pain;for household mobility;with less difficulty;for community mobility;in 12 weeks (<3/10 pain)    Pt will: be able to go from sitting to standing with <3/10 pain and improved speed to improve QOL in 12 weeks.      Plan / Patient Education:     Continue with initial plan of care.  Progress with home program as tolerated. Progression of LE/hip/abdominal strengthening  "exercises, lumbar and hip stretching    Subjective:     Pain Rating: 3/10 pain - Low back pain increased this morning.   Pt is wondering if increased pain is related to new sleeping wedge.       Objective:   Pt having difficulty with sit to stand today. Ambulating: unable to lift LE due to back pain.   Decreased speed with transfers.   ModA needed for supine to s/l.     Exercises:  Exercise #1: ab set with march 5 x 5 sec hold,  Comment #1: bridge with isometric hip add with ball - reduced to GS with hip add due to pain  Exercise #2: supine pilates arch with 4# , 10 reps  Comment #2: seated lumbar flexion stretch 3 x 10-20 sec holds  Exercise #3: seated hip abd with L3 band 10x  Comment #3: LTR x10  Exercise #4: SKTC hold 20 seconds bilaterally  Added LTR and SKTC today.     Treatment Today     TREATMENT MINUTES COMMENTS   Evaluation     Self-care/ Home management     Manual therapy 15 In supine: PROM hip stretch ER, flexion, HS stretch bilaterally   S/L on L: Mild traction on sacrum. STM to low back.    Neuromuscular Re-education 4 K-Tape  Prior to application skin was cleaned with alcohol wipe  3 strips were applied in star pattern to lumbosacral juntion with mild stretch.  No stretch applied to 2\" ends of tape.   Pt was sitting in lumbar flexion during application.      Therapeutic Activity     Therapeutic Exercises 10 -see exercise flow sheet for date   Completed    Exercises from previous episodes of PT :   Seated: marching with L4 band at foot and pressing into band on way down 5x B, seated with L4 band around trunk at T12 with learning backwards and slowly returning to seated supine knee fall out 5x    Not in HEP: standing forward trunk lean with CGA with returning to upright posture for abdominal/back strengthening 5x     PTRx: [id number]    Discussed pt could be appropriate for PCA services- pt is aware of how to reach out to determine if he would qualify at this time   Gait training   "   Modality__________________                Total 25    Blank areas are intentional and mean the treatment did not include these items.       Kathleen Mcgee, PT, DPT  5/14/2021

## 2021-06-17 NOTE — PROGRESS NOTES
[facility] Rehabilitation Daily Progress     Patient Name: Bright Lockett  Date: 2021  Visit #:  until 21  PTA visit #:  na  Referral Diagnosis: Acute bilateral low back pain without sciatica  Referring provider: Ladarius Soria, *  Visit Diagnosis:     ICD-10-CM    1. Acute bilateral low back pain without sciatica  M54.5    2. Generalized muscle weakness  M62.81    3. Decreased ROM of lumbar spine  M53.86    4. Abnormality of gait  R26.9    5. Cerebral palsy, unspecified type (H)  G80.9        Past Medical History:   Diagnosis Date     Aortic sclerosis      Back pain      Benign neoplasm of adenomatous polyp     of the large intestine     Carpal tunnel syndrome      Cerebral palsy (H)      Diverticulosis      Esophageal reflux      Glaucoma      Hyperlipidemia      Hypertension      Left ventricular hypertrophy      Leg weakness     left     Neuritis      Osteoarthritis of knee          Assessment:     HEP/POC compliance is  fair .  Response to Intervention Pt tolerated progression of hip abdominal and gluteal strengthening exercises today. Continued low back pain especially with sit <>stand tranfer.  Patient is appropriate to continue with skilled physical therapy intervention, as indicated by initial plan of care.    Goal Status:  Pt. will demonstrate/verbalize independence in self-management of condition in : 4 weeks  Pt. will be independent with home exercise program in : 4 weeks  Pt. will be able to walk : 2 blocks;on even surfaces;with less pain;for household mobility;with less difficulty;for community mobility;in 12 weeks (<3/10 pain)    Pt will: be able to go from sitting to standing with <3/10 pain and improved speed to improve QOL in 12 weeks.      Plan / Patient Education:     Continue with initial plan of care.  Progress with home program as tolerated. Progression of LE/hip/abdominal strengthening exercises, lumbar and hip stretching    Subjective:     Pain Ratin  Pt  reports his back is more stiff this week. He had to re-assemble his bed earlier this week and his roommate was not able to help. He also had to get up about every hour to check on his roommate who had surgery. He would like to be able to get down to look at his plants. The exercises have started to help.       Objective:     Difficulty with sit<>stand transition at end of session     No increased pain with HEP    Exercises:  Exercise #1: ab set with march 5 x 5 sec hold,  Comment #1: bridge with isometric hip add with ball - reduced to GS with hip add due to pain  Exercise #2: supine pilates arch with 4# , 10 reps  Comment #2: seated lumbar flexion stretch 3 x 10-20 sec holds  Exercise #3: seated hip abd with L3 band 10x        Treatment Today     TREATMENT MINUTES COMMENTS   Evaluation     Self-care/ Home management     Manual therapy     Neuromuscular Re-education     Therapeutic Activity     Therapeutic Exercises 43 -see exercise flow sheet for date   Completed    Exercises from previous episodes of PT reviewed with pt:   Seated: marching with L4 band at foot and pressing into band on way down 5x B, seated with L4 band around trunk at T12 with learning backwards and slowly returning to seated supine knee fall out 5x    Attempted counter stretch- did not add to HEP- pt felt unstable and pain across his lumbar spine   Gait training     Modality__________________                Total 43    Blank areas are intentional and mean the treatment did not include these items.       Leia Mayo, PT, DPT, CLT-MARLEEN  4/30/2021

## 2021-06-18 ENCOUNTER — OFFICE VISIT - HEALTHEAST (OUTPATIENT)
Dept: PHYSICAL THERAPY | Facility: REHABILITATION | Age: 61
End: 2021-06-18

## 2021-06-18 DIAGNOSIS — M62.81 GENERALIZED MUSCLE WEAKNESS: ICD-10-CM

## 2021-06-18 DIAGNOSIS — R26.9 ABNORMALITY OF GAIT: ICD-10-CM

## 2021-06-18 DIAGNOSIS — M53.86 DECREASED ROM OF LUMBAR SPINE: ICD-10-CM

## 2021-06-18 DIAGNOSIS — M54.50 ACUTE BILATERAL LOW BACK PAIN WITHOUT SCIATICA: ICD-10-CM

## 2021-06-18 NOTE — PATIENT INSTRUCTIONS - HE
Patient Instructions by Leia Mayo PT at 5/7/2021  3:00 PM     Author: Leia Mayo PT Service: -- Author Type: Physical Therapist    Filed: 5/7/2021  3:18 PM Encounter Date: 5/7/2021 Status: Signed    : Leia Mayo PT (Physical Therapist)        SEATED MARCHING    While seated, lift your foot off the ground as your bend your knee.  Lower back down and repeat on the opposite leg. Repeat this alternating movement.     GO slow, 5-10 reps, 1-2 times

## 2021-06-18 NOTE — PATIENT INSTRUCTIONS - HE
Patient Instructions by Leia Mayo PT at 4/20/2021  2:30 PM     Author: Leia Mayo PT Service: -- Author Type: Physical Therapist    Filed: 4/20/2021  3:28 PM Encounter Date: 4/20/2021 Status: Signed    : Leia Mayo PT (Physical Therapist)        HIP ADDUCTION SQUEEZE - SUPINE    Place a rolled up towel, ball or pillow between your knees and press your knees together so that you squeeze the object firmly and squeeze your buttocks. Hold 5 sec, 5-10 reps, 1-3 times a day      BRACE MARCHING    While lying on your back with your knees bent,  slowly raise up one foot a few inches and then set it back down.  Next, perform on your other leg.  Use your stomach muscles to keep your spine from moving.    5-10 reps with a 5 second hold, 1-3 times a day

## 2021-06-19 NOTE — PROGRESS NOTES
Assessment/ Plan  Problem List Items Addressed This Visit        High    Essential hypertension (Chronic)     , Unclear whether he is taking losartan in addition to hydrochlorothiazide.  Asked him to check on this, also, check blood pressures a few times a week over the next 3-4 weeks and follow-up with his machine in hand            Unprioritized    Urinary frequency     Ports condition pre-existing before recent UTI.  Asked him to follow-up when he comes in for hypertension, discussion regarding possible treatment of BPH.           Other Visit Diagnoses     Acute cystitis without hematuria    -  Primary    Positive urine culture, classic symptoms, trimethoprim sulfa DS for 10 days    Relevant Orders    Urinalysis (Completed)    Culture, Urine        Subjective  CC:  Chief Complaint   Patient presents with     urinary complications     Frequency, barely anything coming out sometimes, urgency, painful      HPI:  Urgency of Urination  Narrative: Urinary urgency and near incontinence with some slight burning.  --------------------  Duration/Onset 10 days  Severity/ Incontinence? Abruptly/  Associated Dysuria? burning  Average times urinating overnight? modearte  Some increase in smell at first  Denies fever, chills    History of cerebral palsy.  Patient has history of hypertension, not sure if he is taking losartan, knows that he is taking hydrochlorothiazide.    Even before last 10 days, was having some problems with frequency of urination.  Can only make it 2 hours when he goes out.  Would like to do this longer and have discussion about treatment of BPH  PFSH:  Patient Active Problem List   Diagnosis     Glaucoma     Neuritis     Essential hypertension     Osteoarthritis Of The Knee     Cerebral Palsy     Left Ventricular Hypertrophy     Esophageal reflux     Aortic Sclerosis     Benign Adenomatous Polyp Of The Large Intestine     Diverticulosis     Hypercholesterolemia     Low back pain     Proteinuria      Headache     Cough     Urinary frequency     Current medications reviewed as follows:  Current Outpatient Prescriptions on File Prior to Visit   Medication Sig     ASCORBATE CALCIUM (VITAMIN C ORAL) 1 tablet daily. TABS     calcium-magnesium-zinc Tab 1 tablet daily.     fluticasone (ALLERGY RELIEF, FLUTICASONE,) 50 mcg/actuation nasal spray 2 sprays each nostril once daily     hydroCHLOROthiazide (HYDRODIURIL) 25 MG tablet TAKE ONE TABLET BY MOUTH DAILY     ibuprofen (ADVIL,MOTRIN) 600 MG tablet 600 mg PO TID for 5 days scheduled then TID PRN     loratadine (CLARITIN) 10 mg tablet Take 1 tablet (10 mg total) by mouth daily.     losartan (COZAAR) 50 MG tablet Take 1 tablet (50 mg total) by mouth daily.     lovastatin (MEVACOR) 40 MG tablet Take 2 tablets (80 mg total) by mouth at bedtime.     omeprazole (PRILOSEC) 20 MG capsule Take 1 capsule (20 mg total) by mouth daily.     potassium chloride (MICRO-K) 10 mEq CR capsule Take 1 capsule (10 mEq total) by mouth daily.     timolol maleate (TIMOPTIC) 0.5 % ophthalmic solution Administer 1 drop to both eyes daily.     No current facility-administered medications on file prior to visit.      History   Smoking Status     Never Smoker   Smokeless Tobacco     Never Used     Social History     Social History Narrative     Patient Care Team:  Ladarius Soria MD as PCP - General  ROS  Full 10 system review including constitutional, respiratory, cardiac, gi, urinary, rheumatologic, neurologic, reproductive, dermatologic psychiatric is  performed  and is otherwise negative         Objective  Physical Exam  Vitals:    08/15/18 1627   BP: 158/76   Patient Site: Left Arm   Patient Position: Sitting   Cuff Size: Adult Regular   Pulse: 84   Resp: 20   Temp: 97  F (36.1  C)   TempSrc: Oral   Weight: 154 lb (69.9 kg)     Body mass index is 25.05 kg/(m^2).  Alert, no acute distress, does not appear acutely ill.  Vital signs reviewed  Gen- alert, oriented/ appropriately  responsive  HEENT- normal cephalic, atraumatic.   Chest- Normal inspiration and expiration.    Clear to ascultation.    No chest wall deformity or scar.  CV- Heart regular rate and rhythm  normal tones, no murmurs   No gallops or rubs.  Ext- appear well perfused, no edema  Skin- warm and dry,   no visualized rash  No flank tenderness  Diagnostics:   Results for orders placed or performed in visit on 08/15/18   Urinalysis   Result Value Ref Range    Color, UA Yellow Colorless, Yellow, Straw, Light Yellow    Clarity, UA Slightly Cloudy (!) Clear    Glucose, UA Negative Negative    Bilirubin, UA Negative Negative    Ketones, UA Negative Negative    Specific Gravity, UA 1.025 1.005 - 1.030    Blood, UA Moderate (!) Negative    pH, UA 5.5 5.0 - 8.0    Protein,  mg/dL (!) Negative mg/dL    Urobilinogen, UA 0.2 E.U./dL 0.2 E.U./dL, 1.0 E.U./dL    Nitrite, UA Positive (!) Negative    Leukocytes, UA Small (!) Negative           Please note: Voice recognition software was used in this dictation.  It may therefore contain typographical errors.

## 2021-06-19 NOTE — LETTER
Letter by Ladarius Soria MD at      Author: Ladarius Soria MD Service: -- Author Type: --    Filed:  Encounter Date: 7/19/2019 Status: (Other)         Bright Lockett  [address] [place] [address]             July 19, 2019         Dear Mr. Lockett,    Below are the results from your recent visit:    Resulted Orders   Comprehensive Metabolic Panel   Result Value Ref Range    Sodium 141 136 - 145 mmol/L    Potassium 4.3 3.5 - 5.0 mmol/L    Chloride 106 98 - 107 mmol/L    CO2 28 22 - 31 mmol/L    Anion Gap, Calculation 7 5 - 18 mmol/L    Glucose 94 70 - 125 mg/dL    BUN 25 (H) 8 - 22 mg/dL    Creatinine 0.94 0.70 - 1.30 mg/dL    GFR MDRD Af Amer >60 >60 mL/min/1.73m2    GFR MDRD Non Af Amer >60 >60 mL/min/1.73m2    Bilirubin, Total 0.7 0.0 - 1.0 mg/dL    Calcium 9.6 8.5 - 10.5 mg/dL    Protein, Total 7.1 6.0 - 8.0 g/dL    Albumin 3.8 3.5 - 5.0 g/dL    Alkaline Phosphatase 71 45 - 120 U/L    AST 13 0 - 40 U/L    ALT 12 0 - 45 U/L    Narrative    Fasting Glucose reference range is 70-99 mg/dL per  American Diabetes Association (ADA) guidelines.   Uric Acid   Result Value Ref Range    Uric Acid 5.3 3.0 - 8.0 mg/dL   Syphilis Screen, Cascade   Result Value Ref Range    Treponema Antibody (Syphilis) Negative Negative   HIV Antigen/Antibody Screening Cascade   Result Value Ref Range    HIV Antigen / Antibody Negative Negative    Narrative    Method is Abbott HIV Ag/Ab for the detection of HIV p24 antigen, HIV-1 antibodies and HIV-2 antibodies.   Erythrocyte Sedimentation Rate   Result Value Ref Range    Sed Rate 2 0 - 15 mm/hr   Hepatitis Acute Evaluation   Result Value Ref Range    Hepatitis A Antibody, IgM Negative Negative    Hepatitis B Core Minda, IgM Negative Negative    Hepatitis B Surface Ag Negative Negative    Hepatitis C Ab Negative Negative       Bright almost all of your blood tests are back and look okay.    Please call with questions or contact us using  [employer].    Sincerely,        Electronically signed by Ladarius Soria MD

## 2021-06-19 NOTE — LETTER
Letter by Ladarius Soria MD at      Author: Ladarius Soria MD Service: -- Author Type: --    Filed:  Encounter Date: 6/19/2019 Status: (Other)         Bright Lockett  [address] [place] [address]             June 19, 2019         Dear Mr. Lockett,    Below are the results from your recent visit:    Resulted Orders   Basic Metabolic Panel   Result Value Ref Range    Sodium 143 136 - 145 mmol/L    Potassium 3.7 3.5 - 5.0 mmol/L    Chloride 105 98 - 107 mmol/L    CO2 31 22 - 31 mmol/L    Anion Gap, Calculation 7 5 - 18 mmol/L    Glucose 77 70 - 125 mg/dL    Calcium 9.8 8.5 - 10.5 mg/dL    BUN 20 8 - 22 mg/dL    Creatinine 1.01 0.70 - 1.30 mg/dL    GFR MDRD Af Amer >60 >60 mL/min/1.73m2    GFR MDRD Non Af Amer >60 >60 mL/min/1.73m2    Narrative    Fasting Glucose reference range is 70-99 mg/dL per  American Diabetes Association (ADA) guidelines.   Microalbumin, Random Urine   Result Value Ref Range    Microalbumin, Random Urine 61.76 (H) 0.00 - 1.99 mg/dL    Creatinine, Urine 165.5 mg/dL    Microalbumin/Creatinine Ratio Random Urine 373.2 (H) <=19.9 mg/g    Narrative    Microalbumin, Random Urine  <2.0 mg/dL . . . . . . . . Normal  3.0-30.0 mg/dL . . . . . . Microalbuminuria  >30.0 mg/dL . . . . . .  . Clinical Proteinuria  Microalbumin/Creatinine Ratio, Random Urine  <20 mg/g . . . . .. . . . Normal   mg/g . . . . . . . Microalbuminuria  >300 mg/g . . . . . . . . Clinical Proteinuria   ALT (SGPT)   Result Value Ref Range    ALT 14 0 - 45 U/L   Cholesterol, Total   Result Value Ref Range    Cholesterol 210 (H) <=199 mg/dL   HDL Cholesterol   Result Value Ref Range    HDL Cholesterol 41 >=40 mg/dL    Patient Fasting > 8hrs? Yes    Urinalysis-UC if Indicated   Result Value Ref Range    Color, UA Yellow Colorless, Yellow, Straw, Light Yellow    Clarity, UA Clear Clear    Glucose, UA Negative Negative    Bilirubin, UA Negative Negative    Ketones, UA Negative Negative    Specific  Gravity, UA >=1.030 1.005 - 1.030    Blood, UA Small (!) Negative    pH, UA 5.5 5.0 - 8.0    Protein,  mg/dL (!) Negative mg/dL    Urobilinogen, UA 0.2 E.U./dL 0.2 E.U./dL, 1.0 E.U./dL    Nitrite, UA Negative Negative    Leukocytes, UA Negative Negative    Bacteria, UA Few (!) None Seen hpf    RBC, UA 0-2 None Seen, 0-2 hpf    WBC, UA 0-5 None Seen, 0-5 hpf    Squam Epithel, UA 0-5 None Seen, 0-5 lpf    Mucus, UA Few (!) None Seen lpf    Narrative    UC not indicated       Peter, here are your test results.    Your cholesterol is a little high and coupled with your blood pressure, I think he should be back on cholesterol medicine.  By the time you get this letter, someone should have contacted you about that.    The other thing is, you continue to have protein in your urine.  We need to make sure your blood pressure is in good control and use losartan as the medication.  We we will talk about doing some other tests to make sure there is no kidney inflammation when you follow-up.    If you do start the cholesterol medicine, we can check to make sure your liver is tolerating it when you come back in 3 weeks    Please call with questions or contact us using [employer].    Sincerely,        Electronically signed by Ladarius Soria MD

## 2021-06-19 NOTE — LETTER
Letter by Ladarius Soria MD at      Author: Ladarius Soria MD Service: -- Author Type: --    Filed:  Encounter Date: 7/19/2019 Status: (Other)         Bright Lockett  [address] [place] [address]             August 16, 2019         Dear Mr. Lockett,    Below are the results from your recent visit:    Resulted Orders   Comprehensive Metabolic Panel   Result Value Ref Range    Sodium 141 136 - 145 mmol/L    Potassium 4.3 3.5 - 5.0 mmol/L    Chloride 106 98 - 107 mmol/L    CO2 28 22 - 31 mmol/L    Anion Gap, Calculation 7 5 - 18 mmol/L    Glucose 94 70 - 125 mg/dL    BUN 25 (H) 8 - 22 mg/dL    Creatinine 0.94 0.70 - 1.30 mg/dL    GFR MDRD Af Amer >60 >60 mL/min/1.73m2    GFR MDRD Non Af Amer >60 >60 mL/min/1.73m2    Bilirubin, Total 0.7 0.0 - 1.0 mg/dL    Calcium 9.6 8.5 - 10.5 mg/dL    Protein, Total 7.1 6.0 - 8.0 g/dL    Albumin 3.8 3.5 - 5.0 g/dL    Alkaline Phosphatase 71 45 - 120 U/L    AST 13 0 - 40 U/L    ALT 12 0 - 45 U/L    Narrative    Fasting Glucose reference range is 70-99 mg/dL per  American Diabetes Association (ADA) guidelines.   Protein, 24 Hour Urine   Result Value Ref Range    Total Protein, 24 Hour Urine 306 (H) 0 - 150 mg/24hr     Protein, Random Urine 18 mg/dL    24H Urine Volume 1,700 mL   Uric Acid   Result Value Ref Range    Uric Acid 5.3 3.0 - 8.0 mg/dL   Electrophoresis, Protein, Serum   Result Value Ref Range    Albumin % 55.4 51.0 - 67.0 %    Albumin  3.8 3.2 - 4.7 g/dL    Alpha 1 % 3.0 2.0 - 4.0 %    Alpha 1 0.2 0.1 - 0.3 g/dL    Alpha 2 % 10.8 5.0 - 13.0 %    Alpha 2 0.7 0.4 - 0.9 g/dL    % Beta 10.1 10.0 - 17.0 %    Beta 0.7 0.7 - 1.2 g/dL    Gamma Globulin % 20.7 (H) 9.0 - 20.0 %    Gamma Globulin 1.4 0.6 - 1.4 g/dL    ELP Comment Unremarkable protein electrophoresis.      Protein, Total 6.9 6.0 - 8.0 g/dL    Path ICD: R80.9     Interpreted By: Nayeli Burns MD    Syphilis Screen, Cascade   Result Value Ref Range    Treponema Antibody (Syphilis)  Negative Negative   HIV Antigen/Antibody Screening Cascade   Result Value Ref Range    HIV Antigen / Antibody Negative Negative    Narrative    Method is Abbott HIV Ag/Ab for the detection of HIV p24 antigen, HIV-1 antibodies and HIV-2 antibodies.   Erythrocyte Sedimentation Rate   Result Value Ref Range    Sed Rate 2 0 - 15 mm/hr   Hepatitis Acute Evaluation   Result Value Ref Range    Hepatitis A Antibody, IgM Negative Negative    Hepatitis B Core Minda, IgM Negative Negative    Hepatitis B Surface Ag Negative Negative    Hepatitis C Ab Negative Negative       shimon Novoa here is your 24-hour urine protein collection at 300 mg.  It is generally agreed that people that have more than 2000 mg should see a kidney doctor because that often causes a gradual decrease in your kidney filtration rate.  I think 300 mg can be monitored at this point.  The other tests were done to look for things that can cause proteinuria and those tests, are negative.    Please call with questions or contact us using [employer].    Sincerely,        Electronically signed by Ladarius Soria MD

## 2021-06-20 NOTE — LETTER
Letter by Chrissy Arreguin RN at      Author: Chrissy Arreguin RN Service: -- Author Type: --    Filed:  Encounter Date: 5/21/2020 Status: (Other)       5/21/2020        Bright Lockett  [address] [place] [address]    This letter provides a written record that you were tested for COVID-19 on 05/20/20.     Your result was negative.    This means that we didnt find the virus that causes COVID-19 in your sample. A test may show negative when you do actually have the virus. This can happen when the virus is in the early stages of infection, before you feel illness symptoms.    Even if you dont have symptoms, they may still appear. For safety, its very important to follow these rules.    Keep yourself away from others (self-isolation):      Stay home. Dont go to work, school or anywhere else.     Stay in your own room (and use your own bathroom), if you can.    Stay away from others in your home. No hugging, kissing or shaking hands. No visitors.    Clean high touch surfaces often (doorknobs, counters, handles, etc.). Use a household cleaning spray or wipes.    Cover your mouth and nose with a mask, tissue or washcloth to avoid spreading germs.    Wash your hands and face often with soap and water.    Stay in self-isolation until you meet ALL of the guidelines below:    1. You have had no fever for at least 72 hours (that is 3 full days of no fever without the use of medicine that reduces fevers), AND  2. other symptoms (such as cough, shortness of breath) have gotten better, AND  3. at least 10 days have passed since your symptoms first appeared.    Going back to work  Check with your employer for any guidelines to follow for going back to work.    Employers: This document serves as formal notice that your employee tested negative for COVID-19, as of the testing date shown above.    For questions regarding this letter or your Negative COVID-19 result, call [phone] between 8A to 6:30P (M-F) and 10A to  6:30P (weekends).

## 2021-06-21 NOTE — LETTER
Letter by Ladarius Soria MD at      Author: Ladarius Soria MD Service: -- Author Type: --    Filed:  Encounter Date: 4/1/2021 Status: (Other)         Bright Lockett  [address] [place] [address]             April 1, 2021         Dear Mr. Lockett,    Below are the results from your recent visit:    Resulted Orders   Comprehensive Metabolic Panel   Result Value Ref Range    Sodium 144 136 - 145 mmol/L    Potassium 4.1 3.5 - 5.0 mmol/L    Chloride 107 98 - 107 mmol/L    CO2 28 22 - 31 mmol/L    Anion Gap, Calculation 9 5 - 18 mmol/L    Glucose 111 70 - 125 mg/dL    BUN 22 8 - 22 mg/dL    Creatinine 1.01 0.70 - 1.30 mg/dL    GFR MDRD Af Amer >60 >60 mL/min/1.73m2    GFR MDRD Non Af Amer >60 >60 mL/min/1.73m2    Bilirubin, Total 1.0 0.0 - 1.0 mg/dL    Calcium 9.2 8.5 - 10.5 mg/dL    Protein, Total 7.4 6.0 - 8.0 g/dL    Albumin 4.0 3.5 - 5.0 g/dL    Alkaline Phosphatase 78 45 - 120 U/L    AST 14 0 - 40 U/L    ALT 17 0 - 45 U/L    Narrative    Fasting Glucose reference range is 70-99 mg/dL per  American Diabetes Association (ADA) guidelines.   BNP(B-type Natriuretic Peptide)   Result Value Ref Range     (H) 0 - 56 pg/mL       Bright, I hope you have already received the message that your BNP is elevated and I would recommend that we check an echocardiogram of your heart.  I assume someone has already contacted you about this as well.  Happy to discuss this on the phone.  I am sending this letter just to make sure you are notified.    Please call with questions or contact us using [employer].    Sincerely,        Electronically signed by Ladarius Soria MD

## 2021-06-21 NOTE — LETTER
Letter by Ladarius Soria MD at      Author: Ladarius Soria MD Service: -- Author Type: --    Filed:  Encounter Date: 12/31/2020 Status: (Other)         Bright Lockett  [address] [place] [address]             December 31, 2020         Dear Mr. Lockett,    Below are the results from your recent visit:    Resulted Orders   Glycosylated Hemoglobin A1c   Result Value Ref Range    Hemoglobin A1c 5.4 <=5.6 %   Basic Metabolic Panel   Result Value Ref Range    Sodium 143 136 - 145 mmol/L    Potassium 4.2 3.5 - 5.0 mmol/L    Chloride 105 98 - 107 mmol/L    CO2 27 22 - 31 mmol/L    Anion Gap, Calculation 11 5 - 18 mmol/L    Glucose 121 70 - 125 mg/dL    Calcium 9.0 8.5 - 10.5 mg/dL    BUN 24 (H) 8 - 22 mg/dL    Creatinine 0.99 0.70 - 1.30 mg/dL    GFR MDRD Af Amer >60 >60 mL/min/1.73m2    GFR MDRD Non Af Amer >60 >60 mL/min/1.73m2    Narrative    Fasting Glucose reference range is 70-99 mg/dL per  American Diabetes Association (ADA) guidelines.   Hemoglobin   Result Value Ref Range    Hemoglobin 15.2 14.0 - 18.0 g/dL   LDL Cholesterol, Direct   Result Value Ref Range    Direct  (H) <=129 mg/dl       Blood tests look okay, Bright    Please call with questions or contact us using [employer]    Sincerely,        Electronically signed by Ladarius Soria MD

## 2021-06-21 NOTE — LETTER
Letter by Ladarius Soria MD at      Author: Ladarius Soria MD Service: -- Author Type: --    Filed:  Encounter Date: 12/31/2020 Status: (Other)         Bright Lockett  [address] [place] [address]             January 5, 2021         Dear Mr. Lockett,    Below are the results from your recent visit:    Resulted Orders   Glycosylated Hemoglobin A1c   Result Value Ref Range    Hemoglobin A1c 5.4 <=5.6 %   Basic Metabolic Panel   Result Value Ref Range    Sodium 143 136 - 145 mmol/L    Potassium 4.2 3.5 - 5.0 mmol/L    Chloride 105 98 - 107 mmol/L    CO2 27 22 - 31 mmol/L    Anion Gap, Calculation 11 5 - 18 mmol/L    Glucose 121 70 - 125 mg/dL    Calcium 9.0 8.5 - 10.5 mg/dL    BUN 24 (H) 8 - 22 mg/dL    Creatinine 0.99 0.70 - 1.30 mg/dL    GFR MDRD Af Amer >60 >60 mL/min/1.73m2    GFR MDRD Non Af Amer >60 >60 mL/min/1.73m2    Narrative    Fasting Glucose reference range is 70-99 mg/dL per  American Diabetes Association (ADA) guidelines.   Hemoglobin   Result Value Ref Range    Hemoglobin 15.2 14.0 - 18.0 g/dL   LDL Cholesterol, Direct   Result Value Ref Range    Direct  (H) <=129 mg/dl       Bright, your blood tests look pretty good.  I am relieved to see your kidney function has basically returned to normal.  Your cholesterol is a little higher than it has been but not a major problem.  Keep taking the same medications.    Please call with questions or contact us using [employer].    Sincerely,        Electronically signed by Ladarius Soria MD

## 2021-06-25 NOTE — TELEPHONE ENCOUNTER
RN cannot approve Refill Request    RN can NOT refill this medication med is not covered by policy/route to provider. Last office visit: 6/16/2021 Ladarius Soria MD Last Physical: Visit date not found Last MTM visit: Visit date not found Last visit same specialty: 6/16/2021 Ladarius Soria MD.  Next visit within 3 mo: Visit date not found  Next physical within 3 mo: Visit date not found      Kenyatta Deutsch, [facility] Triage/Med Refill 6/16/2021    Requested Prescriptions   Pending Prescriptions Disp Refills     furosemide (LASIX) 20 MG tablet [Pharmacy Med Name: FUROSEMIDE 20MG TABLETS] 90 tablet 0     Sig: TAKE 1 TABLET(20 MG) BY MOUTH DAILY       Diuretics/Combination Diuretics Refill Protocol  Passed - 6/16/2021  3:58 PM        Passed - Visit with PCP or prescribing provider visit in past 12 months     Last office visit with prescriber/PCP: 6/16/2021 Ladarius Soria MD OR same dept: 6/16/2021 Ladarius Soria MD OR same specialty: 6/16/2021 Ladarius Soria MD  Last physical: Visit date not found Last MTM visit: Visit date not found   Next visit within 3 mo: Visit date not found  Next physical within 3 mo: Visit date not found  Prescriber OR PCP: Ladarius Soria MD  Last diagnosis associated with med order: 1. Essential hypertension  - furosemide (LASIX) 20 MG tablet [Pharmacy Med Name: FUROSEMIDE 20MG TABLETS]; TAKE 1 TABLET(20 MG) BY MOUTH DAILY  Dispense: 90 tablet; Refill: 0  - metoprolol succinate (TOPROL-XL) 100 MG 24 hr tablet [Pharmacy Med Name: METOPROLOL ER SUCCINATE 100MG TABS]; TAKE 1 TABLET(100 MG) BY MOUTH DAILY  Dispense: 90 tablet; Refill: 0    2. Edema, unspecified type  - furosemide (LASIX) 20 MG tablet [Pharmacy Med Name: FUROSEMIDE 20MG TABLETS]; TAKE 1 TABLET(20 MG) BY MOUTH DAILY  Dispense: 90 tablet; Refill: 0  - metoprolol succinate (TOPROL-XL) 100 MG 24 hr tablet [Pharmacy Med Name: METOPROLOL ER SUCCINATE 100MG TABS]; TAKE 1 TABLET(100 MG) BY  MOUTH DAILY  Dispense: 90 tablet; Refill: 0    3. Benign prostatic hyperplasia with lower urinary tract symptoms, symptom details unspecified  - finasteride (PROSCAR) 5 mg tablet [Pharmacy Med Name: FINASTERIDE 5MG TABLETS]; TAKE 1 TABLET(5 MG) BY MOUTH DAILY  Dispense: 90 tablet; Refill: 0    If protocol passes may refill for 12 months if within 3 months of last provider visit (or a total of 15 months).             Passed - Serum Potassium in past 12 months      Lab Results   Component Value Date    Potassium 4.1 03/31/2021             Passed - Serum Sodium in past 12 months      Lab Results   Component Value Date    Sodium 144 03/31/2021             Passed - Blood pressure on file in past 12 months     BP Readings from Last 1 Encounters:   06/16/21 (!) 189/96             Passed - Serum Creatinine in past 12 months      Creatinine   Date Value Ref Range Status   03/31/2021 1.01 0.70 - 1.30 mg/dL Final                metoprolol succinate (TOPROL-XL) 100 MG 24 hr tablet [Pharmacy Med Name: METOPROLOL ER SUCCINATE 100MG TABS] 90 tablet 0     Sig: TAKE 1 TABLET(100 MG) BY MOUTH DAILY       Beta-Blockers Refill Protocol Passed - 6/16/2021  3:58 PM        Passed - PCP or prescribing provider visit in past 12 months or next 3 months     Last office visit with prescriber/PCP: 6/16/2021 Ladarius Soria MD OR same dept: 6/16/2021 Ladarius Soria MD OR same specialty: 6/16/2021 Ladarius Soria MD  Last physical: Visit date not found Last [facility] visit: Visit date not found   Next visit within 3 mo: Visit date not found  Next physical within 3 mo: Visit date not found  Prescriber OR PCP: Ladarius Soria MD  Last diagnosis associated with med order: 1. Essential hypertension  - furosemide (LASIX) 20 MG tablet [Pharmacy Med Name: FUROSEMIDE 20MG TABLETS]; TAKE 1 TABLET(20 MG) BY MOUTH DAILY  Dispense: 90 tablet; Refill: 0  - metoprolol succinate (TOPROL-XL) 100 MG 24 hr tablet [Pharmacy Med Name: METOPROLOL ER  SUCCINATE 100MG TABS]; TAKE 1 TABLET(100 MG) BY MOUTH DAILY  Dispense: 90 tablet; Refill: 0    2. Edema, unspecified type  - furosemide (LASIX) 20 MG tablet [Pharmacy Med Name: FUROSEMIDE 20MG TABLETS]; TAKE 1 TABLET(20 MG) BY MOUTH DAILY  Dispense: 90 tablet; Refill: 0  - metoprolol succinate (TOPROL-XL) 100 MG 24 hr tablet [Pharmacy Med Name: METOPROLOL ER SUCCINATE 100MG TABS]; TAKE 1 TABLET(100 MG) BY MOUTH DAILY  Dispense: 90 tablet; Refill: 0    3. Benign prostatic hyperplasia with lower urinary tract symptoms, symptom details unspecified  - finasteride (PROSCAR) 5 mg tablet [Pharmacy Med Name: FINASTERIDE 5MG TABLETS]; TAKE 1 TABLET(5 MG) BY MOUTH DAILY  Dispense: 90 tablet; Refill: 0    If protocol passes may refill for 12 months if within 3 months of last provider visit (or a total of 15 months).             Passed - Blood pressure filed in past 12 months     BP Readings from Last 1 Encounters:   06/16/21 (!) 189/96                finasteride (PROSCAR) 5 mg tablet [Pharmacy Med Name: FINASTERIDE 5MG TABLETS] 90 tablet 0     Sig: TAKE 1 TABLET(5 MG) BY MOUTH DAILY       There is no refill protocol information for this order

## 2021-06-25 NOTE — PROGRESS NOTES
[facility] Daily Progress     Patient Name: Bright Lockett  Date: 5/28/2021  Visit #: 5/12 until 7/19/21  PTA visit #:  na  Referral Diagnosis: Acute bilateral low back pain without sciatica  Referring provider: Ladarius Soria, *  Visit Diagnosis:     ICD-10-CM    1. Acute bilateral low back pain without sciatica  M54.5    2. Generalized muscle weakness  M62.81    3. Decreased ROM of lumbar spine  M53.86    4. Abnormality of gait  R26.9    5. Cerebral palsy, unspecified type (H)  G80.9        Past Medical History:   Diagnosis Date     Aortic sclerosis      Back pain      Benign neoplasm of adenomatous polyp     of the large intestine     Carpal tunnel syndrome      Cerebral palsy (H)      Diverticulosis      Esophageal reflux      Glaucoma      Hyperlipidemia      Hypertension      Left ventricular hypertrophy      Leg weakness     left     Neuritis      Osteoarthritis of knee          Assessment:     HEP/POC compliance is  fair .  Response to Intervention Overall, pt reporting increased pain/stiffness today. He had limited improvement with kinesiotape and manual therapy completed today but was able to complete gluteal strengthening in supine positioning.  Patient is benefitting from skilled physical therapy and is making steady progress toward functional goals.  Patient is appropriate to continue with skilled physical therapy intervention, as indicated by initial plan of care.    Goal Status:  Pt. will demonstrate/verbalize independence in self-management of condition in : 4 weeks  Pt. will be independent with home exercise program in : 4 weeks  Pt. will be able to walk : 2 blocks;on even surfaces;with less pain;for household mobility;with less difficulty;for community mobility;in 12 weeks (<3/10 pain)    Pt will: be able to go from sitting to standing with <3/10 pain and improved speed to improve QOL in 12 weeks.      Plan / Patient Education:     Continue with initial plan of  "care.  Progress with home program as tolerated. Progression of LE/hip/abdominal strengthening exercises, lumbar and hip stretching    Subjective:     Pain Ratin/10 pain - Low back pain     Pt reports he woke up more sore/stiff today. He has been able to do his exercises daily- tries to do as many as he can everyday. Is not sure if the kinesiotape is helpful.       Objective:   Pt having difficulty with supine to sit with increased back pain.   Decreased speed with transfers.   ModA needed for supine to sit  Sit <>stand: pt reports mostly normal speed but increased pain with achieving fully upright posture    Exercises:  Exercise #1: ab set with march 5 x 5 sec hold,  Comment #1: bridge with isometric hip add with ball - reduced to GS with hip add due to pain  Exercise #2: supine pilates arch with 4# , 10 reps  Comment #2: seated lumbar flexion stretch 3 x 10-20 sec holds  Exercise #3: seated hip abd with L3 band 10x  Comment #3: LTR x10  Exercise #4: SKTC hold 20 seconds bilaterally  Comment #4: supine piriformis stretch: verbal instruction      Treatment Today     TREATMENT MINUTES COMMENTS   Evaluation     Self-care/ Home management     Manual therapy 25 In supine: PROM hip stretch ER, SKTC, piriformis stretch, abduction, flexion, HS stretch, ankle dorsiflexion bilaterally   Attempted L S/L lumbar rotational mobilizations but pt did not tolerate side-lying positioning   Neuromuscular Re-education 5 K-Tape  Prior to application skin was cleaned with alcohol wipe  3 strips were applied with 2 from L5 to T10 and across at L4-5. No stretch applied to 2\" ends of tape.   Pt was sitting in lumbar flexion during application.      Therapeutic Activity     Therapeutic Exercises 8 -see exercise flow sheet for date completed: NEW: mini bridge with feet on bolster 2 x 10 reps, reviwe    Exercises from previous episodes of PT :   Seated: marching with L4 band at foot and pressing into band on way down 5x B, seated with L4 " band around trunk at T12 with learning backwards and slowly returning to seated supine knee fall out 5x      PTRx: [id number]   Gait training     Modality__________________                Total 38    Blank areas are intentional and mean the treatment did not include these items.       Leia Mayo, PT, DPT, CLT-MARLEEN  5/28/2021

## 2021-06-25 NOTE — PROGRESS NOTES
Progress Notes by Ladarius Soria MD at 2/27/2017  2:20 PM     Author: Ladarius Soria MD Service: -- Author Type: Physician    Filed: 3/3/2017  6:19 PM Encounter Date: 2/27/2017 Status: Signed    : Ladarius Soria MD (Physician)       Assessment/ Plan  1. Urge incontinence of urine  Unclear etiology    Treat empirically for subclinical prostatitis and follow    Doubt new neurologic impairment based on history, however, patient does have a lax anal sphincter.  Warned if there is any change in erectile function, numbness, other new neurologic deficits this warrants    Otherwise, follow-up if not improving over the next 2-3 weeks  - Urinalysis-UC if Indicated  - Culture, Urine    2. Essential hypertension with goal blood pressure less than 140/90  Patient is nonadherent with blood pressure medications.  Has taken for the last 2 days but irregular prior to that.  Encouraged adherence, follow blood pressure.  Call in numbers in 2 weeks  - Urinalysis-UC if Indicated  - Basic Metabolic Panel  - Culture, Urine    3. Hypercholesterolemia  On lovastatin, recheck labs  - ALT (SGPT)  - Cholesterol, Total  - HDL Cholesterol    4. Proteinuria  *Quantify  - Microalbumin, Random Urine    5. Healthcare maintenance    - Influenza, Seasonal Quad, Preservative Free 36+ Months    There is no height or weight on file to calculate BMI.    Subjective  CC:  Chief Complaint   Patient presents with   ? Urine problem     Not being able hold urine. little pain before urining. no discoloration   ? Flu Symptoms     HPI:  Upper Respiratory infection  Duration 2 weeks but now resolved  Current symptoms  Worst was cough  Runny nose? Yes- later  Sore throat? Initial symptom  Cough/ productive or dry?yes- mild  Fever? no  HA/ achiness? Mild- some sinus pressure  Symptoms at start of illness cough  Any medications? No- cough getting  History of allergies or sinus infections? yes    Urinary urgency  Urinary incont following  severe urgency about 1 week ago, before that did have some symptoms of BPH but were quite mild.  No burning- maybe some abnormal sensation, no discharge sensation  Has not had sex in over a year, patient is homosexual    Below symptoms are based on how he was doing before this last weekend,.  Since this time symptoms been quite severe, particularly the urgency    International Prostate Symptom Score (IPSS)  Questions to be answered Not at all Less than 1 time in 5 Less than half the time About half the time More than half the time Almost always Your score   1. Over the past month, how often have you had a sensation of not emptying your bladder completely after you finished urinating? 0 1 2 3 4 5  1   2. Over the past month, how often have you had to urinate again less than 2 hours after you finished urinating? 0 1 2 3 4 5  1   3. Over the past month, how often have you found you stopped and started again several times when you urinated? 0 1 2 3 4 5  2   4. Over the past month, how often have you found it difficult to postpone urination? 0 1 2 3 4 5  1   5. Over the past month, how often have you had a weak urinary stream? 0 1 2 3 4 5  2   6. Over the past month, how often have you had to push or strain to begin urination? 0 1 2 3 4 5  0   7. Over the past month, how many times did you most typically get up to urinate from the time you went to bed at night until the time you got up in the morning? 0 (none) 1 (1 time) 2 (2 times) 3 (3 times) 4 (4 times) 5 (5 or more times)  1   Sum of numbers (AUA symptom score):     Total score:   0 to 7: Mild symptoms   8 to 19: Moderate symptoms   20 to 35: Severe symptoms   Patient denies any numbness in his saddle area, change in erectile function lately, new back pain, new numbness or tingling or neurologic deficits  HTN  Narrative/ comments: Not taking medications regularly  Antihypertensive meds: lis 20, hctz 25  Does patient check blood pressure on his/her own?  No, he was  given a blood pressure monitor prescription last time but lost it and never got it filled  Low salt, plenty of fruits and vegetables in diet? yes  Exercise?  Minimal  Alcohol?  No  Meds that may raise BP (NSAIDs, decongestants etc?  No    PFSH:  Current medications reviewed as follows:  Current Outpatient Prescriptions on File Prior to Visit   Medication Sig   ? ASCORBATE CALCIUM (VITAMIN C ORAL) 1 tablet daily. TABS   ? calcium-magnesium-zinc Tab 1 tablet daily.   ? hydroCHLOROthiazide (HYDRODIURIL) 25 MG tablet TAKE ONE TABLET BY MOUTH DAILY   ? ibuprofen (ADVIL,MOTRIN) 600 MG tablet 600 mg PO TID for 5 days scheduled then TID PRN   ? lisinopril (PRINIVIL,ZESTRIL) 20 MG tablet Take 1 tablet (20 mg total) by mouth daily.   ? lovastatin (MEVACOR) 40 MG tablet Take 2 tablets (80 mg total) by mouth bedtime.   ? omeprazole (PRILOSEC) 20 MG capsule Take 1 capsule (20 mg total) by mouth daily.   ? potassium chloride (MICRO-K) 10 mEq CR capsule Take 1 capsule (10 mEq total) by mouth daily.   ? timolol maleate (TIMOPTIC) 0.5 % ophthalmic solution Administer 1 drop to both eyes daily.   ? [DISCONTINUED] ciprofloxacin HCl (CIPRO) 250 MG tablet Take 1 tablet (250 mg total) by mouth 2 (two) times a day.   ? [DISCONTINUED] cyclobenzaprine (FLEXERIL) 10 MG tablet Take 10 mg by mouth daily as needed.     No current facility-administered medications on file prior to visit.      Patient Active Problem List   Diagnosis   ? Glaucoma   ? Neuritis   ? Essential hypertension   ? Osteoarthritis Of The Knee   ? Cerebral Palsy   ? Left Ventricular Hypertrophy   ? Esophageal reflux   ? Aortic Sclerosis   ? Benign Adenomatous Polyp Of The Large Intestine   ? Diverticulosis   ? Hypercholesterolemia   ? Low back pain     History   Smoking Status   ? Never Smoker   Smokeless Tobacco   ? Not on file     Social History     Social History Narrative     Patient Care Team:  Ladarius Soria MD as PCP - General  ROS  Full 10 system review  including constitutional, respiratory, cardiac, gi, urinary, rheumatologic, neurologic, reproductive, dermatologic psychiatric is  performed (via questionnaire) and is negative       Objective  Physical Exam  Vitals:    02/27/17 1443   BP: (!) 146/94     Gen- alert, oriented/ appropriately responsive  HEENT- normal cephalic, atraumatic.   Chest- Normal inspiration and expiration.  Clear to ascultation.  No chest wall deformity or scar.  CV- Heart regular rate and rhythm, normal tones, no murmurs gallops or rubs.  Ext- appear well perfused, no edema  Skin- warm and dry, no visualized rash    Diagnostics  Results for orders placed or performed in visit on 02/27/17   Urinalysis-UC if Indicated   Result Value Ref Range    Color, UA Yellow Colorless, Yellow, Straw, Light Yellow    Clarity, UA Clear Clear    Glucose, UA Negative Negative    Bilirubin, UA Negative Negative    Ketones, UA Negative Negative    Specific Gravity, UA 1.025 1.002 - 1.030    Blood, UA Trace (!) Negative    pH, UA 5.0 4.5 - 8.0    Protein,  mg/dL (!) Negative mg/dL    Urobilinogen, UA 0.2 E.U./dL 0.2 E.U./dL, 1.0 E.U./dL    Nitrite, UA Negative Negative    Leukocytes, UA Trace (!) Negative    Bacteria, UA Few (!) None Seen hpf    RBC, UA 0-2 None Seen, 0-2 hpf    WBC, UA 0-5 None Seen, 0-5 hpf    Squam Epithel, UA 0-5 None Seen, 0-5 lpf         Please note: Voice recognition software was used in this dictation.  It may therefore contain typographical errors.

## 2021-06-26 NOTE — PROGRESS NOTES
[facility] Rehabilitation Daily Progress and Discharge Summary    Patient Name: Bright Lockett  Date: 6/18/2021  Visit #: 7/12 until 7/19/21  PTA visit #:  na  Referral Diagnosis: Acute bilateral low back pain without sciatica  Referring provider: Ladarius Soria, *  Visit Diagnosis:     ICD-10-CM    1. Acute bilateral low back pain without sciatica  M54.5    2. Generalized muscle weakness  M62.81    3. Decreased ROM of lumbar spine  M53.86    4. Abnormality of gait  R26.9        Past Medical History:   Diagnosis Date     Aortic sclerosis      Back pain      Benign neoplasm of adenomatous polyp     of the large intestine     Carpal tunnel syndrome      Cerebral palsy (H)      Diverticulosis      Esophageal reflux      Glaucoma      Hyperlipidemia      Hypertension      Left ventricular hypertrophy      Leg weakness     left     Neuritis      Osteoarthritis of knee          Assessment:     HEP/POC compliance is  fair .  Patient demonstrates understanding/independence with home program.  Response to Intervention Pt reports overall his back has improved since starting PT. He had a recent fall this morning so had slight increased pain with supine<>sit transfer today. Pt was reviewed his HEP and felt he is able to continue to manage his symptoms independently at this time.    Goal Status:  Pt. will demonstrate/verbalize independence in self-management of condition in : 4 weeks  Pt. will be independent with home exercise program in : 4 weeks  Pt. will be able to walk : 2 blocks;on even surfaces;with less pain;for household mobility;with less difficulty;for community mobility;in 12 weeks (<3/10 pain, progressing towards- has not gone 2 blocks but 1/10 pain consistently)    Pt will: be able to go from sitting to standing with <3/10 pain and improved speed to improve QOL in 12 weeks. (MET)      Plan / Patient Education:     Pt is appropriate for discharge to self-management at this time.     Subjective:  "    Pain Rating: \"low\" 1/10 pain - Low back pain     Pt fell this morning getting back into his apartment to go to the bathroom. He feels he was rushing. He got a cut above his right eye and skull. He denies any headache, changes in vision and cognition.  No other symptoms from the fall reported.  He had episodes of pain while standing     Objective:   Bo with supine<>sit mild pain today  Decreased speed with transfers.   Sit <>stand: pt reports mostly normal speed today and less pain    Exercises:  Exercise #1: ab set with march 5 x 5 sec hold,  Comment #1: bridge with isometric hip add with ball - reduced to GS with hip add due to pain  Exercise #2: supine pilates arch with 4# , 10 reps  Comment #2: seated lumbar flexion stretch 3 x 10-20 sec holds  Exercise #3: seated hip abd with L3 band 10x  Comment #3: LTR x10  Exercise #4: SKTC hold 20 seconds bilaterally  Comment #4: supine piriformis stretch: verbal instruction  Exercise #5: bridge with feet on bolster- small due to pain 10x 2 sets      Treatment Today     TREATMENT MINUTES COMMENTS   Evaluation     Self-care/ Home management     Manual therapy 20 In supine: PROM hip stretch ER, SKTC, piriformis stretch, abduction, flexion, HS stretch skyler  Reviewed that if pt does receive PCA services in the future could have PCA complete these stretches or help at gym    Neuromuscular Re-education     Therapeutic Activity     Therapeutic Exercises 8 -see exercise flow sheet for date completed: verbal review of HEP    Exercises from previous episodes of PT :   Seated: marching with L4 band at foot and pressing into band on way down B, seated with L4 band around trunk at T12 with learning backwards and slowly returning to seated supine knee fall out    Discussed fall and pt aware of when to go into MD if needed    PTRx: fvubrrdzaw   Gait training     Modality__________________                Total 28    Blank areas are intentional and mean the treatment did not include " these items.       Leia Mayo, PT, DPT, CLT-MARLEEN  6/18/2021

## 2021-06-26 NOTE — PROGRESS NOTES
Assessment/ Plan  Problem List Items Addressed This Visit        High    Essential hypertension - Primary (Chronic)     Current antihypertensives losartan 100, hydrochlorothiazide 25, metoprolol XL 50  Hypertension is poorly controlled  BP Readings from Last 3 Encounters:   06/16/21 (!) 189/96   05/28/21 (!) 180/93   03/31/21 180/82     Also with lower extremity edema, slightly elevated BNP, LVH , chronic kidney disease 3-day  Plan: Add furosemide 20, discontinue hydrochlorothiazide, increase metoprolol to 100, follow-up 1 month      Results for orders placed or performed in visit on 12/30/20   Basic Metabolic Panel   Result Value Ref Range    Sodium 143 136 - 145 mmol/L    Potassium 4.2 3.5 - 5.0 mmol/L    Chloride 105 98 - 107 mmol/L    CO2 27 22 - 31 mmol/L    Anion Gap, Calculation 11 5 - 18 mmol/L    Glucose 121 70 - 125 mg/dL    Calcium 9.0 8.5 - 10.5 mg/dL    BUN 24 (H) 8 - 22 mg/dL    Creatinine 0.99 0.70 - 1.30 mg/dL    GFR MDRD Af Amer >60 >60 mL/min/1.73m2    GFR MDRD Non Af Amer >60 >60 mL/min/1.73m2                 Left Ventricular Hypertrophy     Noted on echocardiogram again, probably some diastolic dysfunction.  Discussed importance of good blood pressure control.            Unprioritized    Benign prostatic hyperplasia with lower urinary tract symptoms, symptom details unspecified     Add finasteride since medication has not helped him much.         Daytime sleepiness     Frequent urination at night, unknown snoring, suggested referral to sleep medicine, patient declined for now         Edema, unspecified type     Furosemide, physical therapy referral for edema, continue efforts at compression         Relevant Orders    Ambulatory referral to PT/OT        Subjective  CC:  Chief Complaint   Patient presents with     Follow-up     HPI:  History as discussed above.    Reviewed echocardiogram results    Edema continues    Blood pressure elevated    Pretty severe daytime sleepiness.  Thinks this may be  due to urinary frequency at night.    Having physical therapy for his back which has been helping a great deal.  They suggested he talk to his doctor about physical therapy for lymphedema.  PFSH:  Patient Active Problem List   Diagnosis     Unspecified glaucoma     Neuritis     Essential hypertension     Osteoarthritis Of The Knee     Cerebral Palsy     Left Ventricular Hypertrophy     Esophageal reflux     Aortic Sclerosis     Benign Adenomatous Polyp Of The Large Intestine     Diverticulosis     Hypercholesterolemia     Acute bilateral low back pain without sciatica     Proteinuria     Headache     Cough     Urinary frequency     Healthcare maintenance     Benign prostatic hyperplasia with lower urinary tract symptoms, symptom details unspecified     Daytime sleepiness     Cervical lymphadenopathy     Hyperglycemia     Stage 3a chronic kidney disease     Edema, unspecified type     Current medications reviewed as follows:  Current Outpatient Medications on File Prior to Visit   Medication Sig     atorvastatin (LIPITOR) 20 MG tablet Take 1 tablet (20 mg total) by mouth at bedtime.     potassium chloride (MICRO-K) 10 mEq CR capsule Take 1 capsule (10 mEq total) by mouth daily.     tamsulosin (FLOMAX) 0.4 mg cap TAKE 1 CAPSULE(0.4 MG) BY MOUTH DAILY AFTER BREAKFAST     timolol maleate (TIMOPTIC) 0.5 % ophthalmic solution Administer 1 drop to both eyes daily.     [DISCONTINUED] hydroCHLOROthiazide (HYDRODIURIL) 25 MG tablet TAKE 1 TABLET(25 MG) BY MOUTH DAILY     ascorbic acid, vitamin C, (VITAMIN C) 1000 MG tablet Take 1,000 mg by mouth daily.     calcium-magnesium-zinc Tab 1 tablet daily.     cyclobenzaprine (FLEXERIL) 5 MG tablet Take 1 tablet (5 mg total) by mouth 3 (three) times a day as needed for muscle spasms.     fluticasone (ALLERGY RELIEF, FLUTICASONE,) 50 mcg/actuation nasal spray 2 sprays each nostril once daily     HYDROcodone-acetaminophen 5-325 mg per tablet Take 1 tablet by mouth every 6 (six) hours  as needed for pain.     loratadine (CLARITIN) 10 mg tablet Take 1 tablet (10 mg total) by mouth daily.     losartan (COZAAR) 100 MG tablet Take 1 tablet (100 mg total) by mouth daily.     omeprazole (PRILOSEC) 20 MG capsule Take 1 capsule (20 mg total) by mouth daily.     [DISCONTINUED] metoprolol succinate (TOPROL-XL) 50 MG 24 hr tablet TAKE 1 TABLET(50 MG) BY MOUTH DAILY     No current facility-administered medications on file prior to visit.      Social History     Tobacco Use   Smoking Status Never Smoker   Smokeless Tobacco Never Used     Social History     Social History Narrative     Not on file     Patient Care Team:  Ladarius Soria MD as PCP - General  Ladarius Soria MD as Assigned PCP  ROS  As above      Objective  Physical Exam  Vitals:    06/16/21 1513   BP: (!) 189/96   Patient Site: Right Arm   Patient Position: Sitting   Cuff Size: Adult Regular   Pulse: 90   Resp: 17   Weight: 176 lb (79.8 kg)     Body mass index is 29.51 kg/m .  2+ lower extremity edema.  Chest clear, cardiovascular exam normal.  Diagnostics:   None      Please note: Voice recognition software was used in this dictation.  It may therefore contain typographical errors.

## 2021-06-26 NOTE — PATIENT INSTRUCTIONS - HE
Stop hydrochlorothiazide  Start furosemide 20 mg    Increase metoprolol xl  To 100 mg     See me back in 1 mo

## 2021-06-26 NOTE — PROGRESS NOTES
[facility] Rehabilitation Daily Progress     Patient Name: Bright Lockett  Date: 6/11/2021  Visit #: 6/12 until 7/19/21  PTA visit #:  na  Referral Diagnosis: Acute bilateral low back pain without sciatica  Referring provider: Ladarius Soria, *  Visit Diagnosis:     ICD-10-CM    1. Acute bilateral low back pain without sciatica  M54.5    2. Generalized muscle weakness  M62.81    3. Decreased ROM of lumbar spine  M53.86    4. Abnormality of gait  R26.9    5. Cerebral palsy, unspecified type (H)  G80.9        Past Medical History:   Diagnosis Date     Aortic sclerosis      Back pain      Benign neoplasm of adenomatous polyp     of the large intestine     Carpal tunnel syndrome      Cerebral palsy (H)      Diverticulosis      Esophageal reflux      Glaucoma      Hyperlipidemia      Hypertension      Left ventricular hypertrophy      Leg weakness     left     Neuritis      Osteoarthritis of knee          Assessment:     HEP/POC compliance is  fair .  Patient demonstrates understanding/independence with home program.  Response to Intervention Pt demonstrates improvements in transfers and decreased pain today.   Patient is benefitting from skilled physical therapy and is making steady progress toward functional goals.  Patient is appropriate to continue with skilled physical therapy intervention, as indicated by initial plan of care.    Goal Status:  Pt. will demonstrate/verbalize independence in self-management of condition in : 4 weeks  Pt. will be independent with home exercise program in : 4 weeks  Pt. will be able to walk : 2 blocks;on even surfaces;with less pain;for household mobility;with less difficulty;for community mobility;in 12 weeks (<3/10 pain)    Pt will: be able to go from sitting to standing with <3/10 pain and improved speed to improve QOL in 12 weeks.      Plan / Patient Education:     Continue with initial plan of care.  Progress with home program as tolerated. Progression of  "LE/hip/abdominal strengthening exercises, lumbar and hip stretching    Subjective:     Pain Rating: \"low\" 1/10 pain - Low back pain     Pt reports his back has been doing pretty well. He had to stress it last night while taking a bath. Overall, his back has been doing well- he is not saying \"ouch\" with every movement.     Objective:   Bo with supine<>sit no audible pain today  Decreased speed with transfers.   Sit <>stand: pt reports mostly normal speed today and less pain    Exercises:  Exercise #1: ab set with march 5 x 5 sec hold,  Comment #1: bridge with isometric hip add with ball - reduced to GS with hip add due to pain  Exercise #2: supine pilates arch with 4# , 10 reps  Comment #2: seated lumbar flexion stretch 3 x 10-20 sec holds  Exercise #3: seated hip abd with L3 band 10x  Comment #3: LTR x10  Exercise #4: SKTC hold 20 seconds bilaterally  Comment #4: supine piriformis stretch: verbal instruction  Exercise #5: bridge with feet on bolster- small due to pain 10x 2      Treatment Today     TREATMENT MINUTES COMMENTS   Evaluation     Self-care/ Home management     Manual therapy 15 In supine: PROM hip stretch ER, SKTC, piriformis stretch, abduction, flexion, HS stretch, ankle dorsiflexion bilaterally    Neuromuscular Re-education     Therapeutic Activity     Therapeutic Exercises 10 -see exercise flow sheet for date completed: mini bridge with feet on bolster 3 x 10 reps, reviewed    Exercises from previous episodes of PT :   Seated: marching with L4 band at foot and pressing into band on way down B, seated with L4 band around trunk at T12 with learning backwards and slowly returning to seated supine knee fall out      PTRx: fvubrrdzaw   Gait training     Modality__________________                Total 25    Blank areas are intentional and mean the treatment did not include these items.       Leia Mayo, PT, DPT, CLT-MARLEEN  6/11/2021  "

## 2021-06-27 NOTE — PROGRESS NOTES
Progress Notes by Ladarius Soria MD at 6/18/2019  3:00 PM     Author: Ladarius Soria MD Service: -- Author Type: Physician    Filed: 7/12/2019 10:11 AM Encounter Date: 6/18/2019 Status: Signed    : Ladarius Soria MD (Physician)       Assessment/ Plan    Problem List Items Addressed This Visit        High    Essential hypertension - Primary (Chronic)     Patient had stopped all medications.  Restart losartan 50, hold on restarting hydrochlorothiazide because of urinary frequency issues.  Base metabolic profile and microalbumin ordered, history of mild proteinuria.  Follow-up 1 month         Relevant Medications    losartan (COZAAR) 50 MG tablet    Other Relevant Orders    Basic Metabolic Panel    Microalbumin, Random Urine    ALT (SGPT)    Cholesterol, Total    HDL Cholesterol    Hypercholesterolemia (Chronic)     Total cholesterol and HDL ordered today was ALT-previously on lovastatin 40         Left Ventricular Hypertrophy    Relevant Medications    losartan (COZAAR) 50 MG tablet       Unprioritized    Proteinuria     Await microalbumin, restart losartan         Healthcare maintenance     Tetanus ordered, will check on reimbursement for Shingrix         Benign prostatic hyperplasia with lower urinary tract symptoms, symptom details unspecified     Symptoms of urinary frequency, and urgency moderate to severe AUA, enlarged prostate.  Check UA, start tamsulosin, follow-up 1 month         Relevant Medications    tamsulosin (FLOMAX) 0.4 mg cap    Other Relevant Orders    Urinalysis-UC if Indicated (Completed)          Subjective  CC:  Chief Complaint   Patient presents with   ? Blood Pressure Check     HPI:    Symptoms of BPH    Narrative/chief complaint: urgency and frequency  Duration/ onset: 9 months +  Degree that symptoms are bothersome: severe, keeps him from being able to ride the bus confidently, has had accidents  Associated urinary symptoms? no  Other associated symptoms:  no      International Prostate Symptom Score (IPSS)  Questions to be answered Not at all Less than 1 time in 5 Less than half the time About half the time More than half the time Almost always Your score   1. Over the past month, how often have you had a sensation of not emptying your bladder completely after you finished urinating? 0 1 2 3 4 5  0   2. Over the past month, how often have you had to urinate again less than 2 hours after you finished urinating? 0 1 2 3 4 5  4   3. Over the past month, how often have you found you stopped and started again several times when you urinated? 0 1 2 3 4 5  0   4. Over the past month, how often have you found it difficult to postpone urination? 0 1 2 3 4 5 5   5. Over the past month, how often have you had a weak urinary stream? 0 1 2 3 4 5  2   6. Over the past month, how often have you had to push or strain to begin urination? 0 1 2 3 4 5  2   7. Over the past month, how many times did you most typically get up to urinate from the time you went to bed at night until the time you got up in the morning? 0 (none) 1 (1 time) 2 (2 times) 3 (3 times) 4 (4 times) 5 (5 or more times)  4   Sum of numbers (AUA symptom score):     Total score:   0 to 7: Mild symptoms   8 to 19: Moderate symptoms   20 to 35: Severe symptoms             HTN Follow-up    BP meds stopped taking them.  Not entirely clear why.  Problems with meds?  Yes: She has had frequency described above was on diuretic  BP Readings from Last 3 Encounters:   06/18/19 (!) 160/92   08/15/18 158/76   03/07/18 130/80     BP readings outside of clinic?  No  Recently on losartan 50, hydrochlorothiazide 25  Results for orders placed or performed in visit on 01/15/18   Basic Metabolic Panel   Result Value Ref Range    Sodium 141 136 - 145 mmol/L    Potassium 3.7 3.5 - 5.0 mmol/L    Chloride 98 98 - 107 mmol/L    CO2 30 22 - 31 mmol/L    Anion Gap, Calculation 13 5 - 18 mmol/L    Glucose 87 70 - 125 mg/dL    Calcium 9.3 8.5 -  10.5 mg/dL    BUN 17 8 - 22 mg/dL    Creatinine 0.97 0.70 - 1.30 mg/dL    GFR MDRD Af Amer >60 >60 mL/min/1.73m2    GFR MDRD Non Af Amer >60 >60 mL/min/1.73m2   Agreeable to start taking again.    Also, patient was on Mevacor up until a few months ago.  Stop these as well.  Explored reasons why he stopped medication, indicated that his blood pressure was not a priority anymore.  Screen for the depression, this is negative.  Talked about how he has a number of people in the building where he lives that he helps out.  Agreeable to restart medications.  PFSH:  Patient Active Problem List   Diagnosis   ? Glaucoma   ? Neuritis   ? Essential hypertension   ? Osteoarthritis Of The Knee   ? Cerebral Palsy   ? Left Ventricular Hypertrophy   ? Esophageal reflux   ? Aortic Sclerosis   ? Benign Adenomatous Polyp Of The Large Intestine   ? Diverticulosis   ? Hypercholesterolemia   ? Low back pain   ? Proteinuria   ? Headache   ? Cough   ? Urinary frequency   ? Healthcare maintenance   ? Benign prostatic hyperplasia with lower urinary tract symptoms, symptom details unspecified     Current medications reviewed as follows:  Current Outpatient Medications on File Prior to Visit   Medication Sig   ? ASCORBATE CALCIUM (VITAMIN C ORAL) 1 tablet daily. TABS   ? calcium-magnesium-zinc Tab 1 tablet daily.   ? fluticasone (ALLERGY RELIEF, FLUTICASONE,) 50 mcg/actuation nasal spray 2 sprays each nostril once daily   ? ibuprofen (ADVIL,MOTRIN) 600 MG tablet 600 mg PO TID for 5 days scheduled then TID PRN   ? loratadine (CLARITIN) 10 mg tablet Take 1 tablet (10 mg total) by mouth daily.   ? lovastatin (MEVACOR) 40 MG tablet Take 2 tablets (80 mg total) by mouth at bedtime.   ? omeprazole (PRILOSEC) 20 MG capsule Take 1 capsule (20 mg total) by mouth daily.   ? potassium chloride (MICRO-K) 10 mEq CR capsule Take 1 capsule (10 mEq total) by mouth daily.   ? timolol maleate (TIMOPTIC) 0.5 % ophthalmic solution Administer 1 drop to both eyes  "daily.   ? [DISCONTINUED] hydroCHLOROthiazide (HYDRODIURIL) 25 MG tablet TAKE ONE TABLET BY MOUTH DAILY   ? [DISCONTINUED] losartan (COZAAR) 50 MG tablet Take 1 tablet (50 mg total) by mouth daily.     No current facility-administered medications on file prior to visit.      Social History     Tobacco Use   Smoking Status Never Smoker   Smokeless Tobacco Never Used     Social History     Social History Narrative   ? Not on file     Patient Care Team:  Ladarius Soria MD as PCP - General  ROS      Objective  Physical Exam  Vitals:    06/18/19 1501 06/18/19 1512 06/18/19 1557   BP: (!) 162/96 (!) 160/92 (!) 162/100   Patient Site: Right Arm Right Arm Right Arm   Patient Position: Sitting Sitting Sitting   Cuff Size: Adult Regular Adult Regular Adult Regular   Pulse: 82     Resp: 18     Weight: 159 lb (72.1 kg)     Height: 5' 5.5\" (1.664 m)       Body mass index is 26.06 kg/m .  Gen- alert, oriented/ appropriately responsive  HEENT- normal cephalic, atraumatic.   Chest- Normal inspiration and expiration.    Clear to ascultation.    No chest wall deformity or scar.  CV- Heart regular rate and rhythm  normal tones, no murmurs   No gallops or rubs.  Ext- appear well perfused, no edema  Skin- warm and dry,   no visualized rash  Rectal exam done, normal anal sphincter, enlarged prostate, diffuse, no nodules or asymmetry    Diagnostics:   Results for orders placed or performed in visit on 06/18/19   Urinalysis-UC if Indicated   Result Value Ref Range    Color, UA Yellow Colorless, Yellow, Straw, Light Yellow    Clarity, UA Clear Clear    Glucose, UA Negative Negative    Bilirubin, UA Negative Negative    Ketones, UA Negative Negative    Specific Gravity, UA >=1.030 1.005 - 1.030    Blood, UA Small (!) Negative    pH, UA 5.5 5.0 - 8.0    Protein,  mg/dL (!) Negative mg/dL    Urobilinogen, UA 0.2 E.U./dL 0.2 E.U./dL, 1.0 E.U./dL    Nitrite, UA Negative Negative    Leukocytes, UA Negative Negative    Bacteria, UA " Few (!) None Seen hpf    RBC, UA 0-2 None Seen, 0-2 hpf    WBC, UA 0-5 None Seen, 0-5 hpf    Squam Epithel, UA 0-5 None Seen, 0-5 lpf    Mucus, UA Few (!) None Seen lpf           Please note: Voice recognition software was used in this dictation.  It may therefore contain typographical errors.

## 2021-06-30 NOTE — PROGRESS NOTES
Progress Notes by Leia Mayo PT at 4/20/2021  2:30 PM     Author: Leia Mayo PT Service: -- Author Type: Physical Therapist    Filed: 4/21/2021  1:03 PM Encounter Date: 4/20/2021 Status: Attested    : Leia Mayo PT (Physical Therapist) Cosigner: Ladarius Soria MD at 5/7/2021  9:42 AM    Attestation signed by Ladarius Soria MD at 5/7/2021  9:42 AM    reviewed                    [facility] Rehabilitation Certification Request    April 20, 2021      Patient: Bright Lockett  MR Number: [id number]  YOB: 1960  Date of Visit: 4/20/2021      Dear Dr. Soria :    Thank you for this referral.   We are seeing Bright Lockett for Physical Therapy of his low back pain.    Medicare and/or Medicaid requires physician review and approval of the treatment plan. Please review the plan of care and verify that you agree with the therapy plan of care by co-signing this note.      Plan of Care  Authorization / Certification Start Date: 04/20/21  Authorization / Certification End Date: 07/19/21  Authorization / Certification Number of Visits: 12  Communication with: Referral Source  Patient Related Instruction: Nature of Condition;Treatment plan and rationale;Basis of treatment;Self Care instruction;Posture;Precautions;Next steps;Body mechanics;Expected outcome  Times per Week: 1-2  Number of Weeks: 12  Number of Visits: 12  Discharge Planning: when goals are met or pt has reached a plateau in progress  Therapeutic Exercise: ROM;Stretching;Strengthening  Neuromuscular Reeducation: kinesio tape;posture;balance/proprioception;TNE;core  Manual Therapy: soft tissue mobilization;myofascial release;joint mobilization;muscle energy  Modalities: cold pack;hot pack (prn)  Gait Training: to reduce pain and decrease falls risk      Goals:  Pt. will demonstrate/verbalize independence in self-management of condition in : 4 weeks  Pt. will be independent with home exercise program in : 4  weeks  Pt. will be able to walk : 2 blocks;on even surfaces;with less pain;for household mobility;with less difficulty;for community mobility;in 12 weeks (<3/10 pain)    Pt will: be able to go from sitting to standing with <3/10 pain and improved speed to improve QOL in 12 weeks.        If you have any questions or concerns, please don't hesitate to call.    Sincerely,      Leia Mayo, PT        Physician recommendation:     ___ Follow therapist's recommendation        ___ Modify therapy      *Physician co-signature indicates they certify the need for these services furnished within this plan and while under their care.         [facility]   [place]el[facility] Initial Evaluation    Patient Name: Bright Lockett  Date of evaluation: 4/20/2021  Referral Diagnosis: Acute bilateral low back pain without sciatica  Referring provider: Ladarius Soria, *  Visit Diagnosis:     ICD-10-CM    1. Acute bilateral low back pain  M54.5    2. Generalized muscle weakness  M62.81    3. Decreased ROM of lumbar spine  M53.86    4. Abnormality of gait  R26.9    5. Cerebral palsy, unspecified type (H)  G80.9    6. Acute bilateral low back pain without sciatica  M54.5        Assessment:      Impairments in  pain, posture, ROM, joint mobility, strength, ADL's, gait/locomotion and balance  The POC is dynamic and will be modified on an ongoing basis.  Barriers to achieving goals as noted in the assessment section may affect outcome.  Prognosis to achieve goals is  good   Pt. is appropriate for skilled PT intervention as outlined in the Plan of Care (POC).  Pt. is a good candidate for skilled PT services to improve pain levels and function.  Plan of care and goals were established in collaboration with patient.     Goals:  Pt. will demonstrate/verbalize independence in self-management of condition in : 4 weeks  Pt. will be independent with home exercise program in : 4 weeks  Pt. will be able to walk : 2 blocks;on  "even surfaces;with less pain;for household mobility;with less difficulty;for community mobility;in 12 weeks (<3/10 pain)    Pt will: be able to go from sitting to standing with <3/10 pain and improved speed to improve QOL in 12 weeks.      Patient's expectations/goals are realistic.    Barriers to Learning or Achieving Goals:  Co-morbidities or other medical factors.  see below       Plan / Patient Instructions:        Plan of Care:   Authorization / Certification Start Date: 04/20/21  Authorization / Certification End Date: 07/19/21  Authorization / Certification Number of Visits: 12  Communication with: Referral Source  Patient Related Instruction: Nature of Condition;Treatment plan and rationale;Basis of treatment;Self Care instruction;Posture;Precautions;Next steps;Body mechanics;Expected outcome  Times per Week: 1-2  Number of Weeks: 12  Number of Visits: 12  Discharge Planning: when goals are met or pt has reached a plateau in progress  Therapeutic Exercise: ROM;Stretching;Strengthening  Neuromuscular Reeducation: kinesio tape;posture;balance/proprioception;TNE;core  Manual Therapy: soft tissue mobilization;myofascial release;joint mobilization;muscle energy  Modalities: cold pack;hot pack (prn)  Gait Training: to reduce pain and decrease falls risk      Plan for next visit: review HEP, progress hip/abdominal strengthening, seated lumbar stretching, could trial NuStep, TUG, falls risk handout     Subjective:         Social information:   Living Situation:apartment- no stairs   Occupation:unemployed- has not been employed since 1992   Work Status:NA   Equipment Available: Stellar    History of Present Illness:    Bright is a 61 y.o. male who presents to therapy today with complaints of skyler lumbar spine pain. Pt reports when he gets up he has to use his right arm. His \"intestines\" will cramp up if he stands with his left arm. In the past 2 weeks his back and intestines have improved. Date of " "onset/duration of symptoms is about 2021. He cannot think of any reason his back pain started. He has a history of cerebral that has affected his skyler legs. The pain is becoming less intense. Symptoms are intermittent. It is typically worse with standing, walking, getting up/down.    Patient Active Problem List   Diagnosis   ? Unspecified glaucoma   ? Neuritis   ? Essential hypertension   ? Osteoarthritis Of The Knee   ? Cerebral Palsy   ? Left Ventricular Hypertrophy   ? Esophageal reflux   ? Aortic Sclerosis   ? Benign Adenomatous Polyp Of The Large Intestine   ? Diverticulosis   ? Hypercholesterolemia   ? Acute bilateral low back pain without sciatica   ? Proteinuria   ? Headache   ? Cough   ? Urinary frequency   ? Healthcare maintenance   ? Benign prostatic hyperplasia with lower urinary tract symptoms, symptom details unspecified   ? Daytime sleepiness   ? Cervical lymphadenopathy   ? Hyperglycemia   ? Stage 3a chronic kidney disease   ? Edema, unspecified type     Past Medical History:   Diagnosis Date   ? Aortic sclerosis    ? Back pain    ? Benign neoplasm of adenomatous polyp     of the large intestine   ? Carpal tunnel syndrome    ? Cerebral palsy (H)    ? Diverticulosis    ? Esophageal reflux    ? Glaucoma    ? Hyperlipidemia    ? Hypertension    ? Left ventricular hypertrophy    ? Leg weakness     left   ? Neuritis    ? Osteoarthritis of knee          Pain Ratin  Pain rating at best: 0  Pain rating at worst: 6  Pain description: aching and \"more distinct\"    Functional limitations are described as occurring with:   Stand 15 min  Walk very limited, maybe 1 block- but very slowly; recent years has been able to do 1/2 mile             Objective:      Note: Items left blank indicates the item was not performed or not indicated at the time of the evaluation.    Patient Outcome Measures :    Unable to complete outcome measure due to vision impairment    Examination  1. Acute bilateral low back pain  "    2. Generalized muscle weakness     3. Decreased ROM of lumbar spine     4. Abnormality of gait     5. Cerebral palsy, unspecified type (H)     6. Acute bilateral low back pain without sciatica       Involved side: Bilateral    Seated on chair to stand up: 30 sec     Gait: very small step length, IR of skyler feet, narrow YURY    Sit to supine: independent with anterior abdominal pain, sit to supine: independent both with significant increase in time to complete transition- pt reports it goes better at home    Lumbar ROM:  In seated  Date: 4/20/2021     *Indicate scale AROM AROM AROM   Lumbar Flexion To mid-lower leg     Lumbar Extension Severe with increased pain      Right Left Right Left Right Left   Lumbar Sidebending         Lumbar Rotation         Thoracic Flexion      Thoracic Extension severe     Thoracic Sidebending         Thoracic Rotation severe mod         In seated, pt unable to lift knees up, able to extend knees with skyler knee flexion contracture, very minimal ankle inversion/eversion mobility in skyler motion    Lumbar Special Tests:     Lumbar Special Tests Right Left SI Tests Right  Left   Quadrant test   SI Compression     Straight leg raise - but limited hamstring length - but limited hamstring length SI Distraction     Crossover response   POSH Test     Slump   Sacral Thrust     Sit-up test  FADIR     Trunk extensor endurance test  Resisted Abduction     Prone instability test  Other:     Pubic shotgun  Other:       Limited hip flexion PROM to 90 deg skyler, mod restriction in IR/ER     Treatment Today     TREATMENT MINUTES COMMENTS   Evaluation 40 -lumbar spine  -educated on POC and diagnosis   Self-care/ Home management     Manual therapy     Neuromuscular Re-education     Therapeutic Activity     Therapeutic Exercises 15 -see exercise flow sheet  - bridge and ab set with march 5 x 5 sec hold of each   Gait training     Modality__________________                Total 55    Blank areas are intentional  and mean the treatment did not include these items.     PT Evaluation Code: (Please list factors)  Patient History/Comorbidities: CP, see above  Examination: lumbar spine  Clinical Presentation: stable  Clinical Decision Making: low    Patient History/  Comorbidities Examination  (body structures and functions, activity limitations, and/or participation restrictions) Clinical Presentation Clinical Decision Making (Complexity)   No documented Comorbidities or personal factors 1-2 Elements Stable and/or uncomplicated Low   1-2 documented comorbidities or personal factor 3 Elements Evolving clinical presentation with changing characteristics Moderate   3-4 documented comorbidities or personal factors 4 or more Unstable and unpredictable High                Leia Mayo, PT, DPT, CLT-MARLEEN  4/20/2021  8:06 AM

## 2021-07-06 VITALS
SYSTOLIC BLOOD PRESSURE: 189 MMHG | HEART RATE: 90 BPM | RESPIRATION RATE: 17 BRPM | DIASTOLIC BLOOD PRESSURE: 96 MMHG | BODY MASS INDEX: 29.51 KG/M2 | WEIGHT: 176 LBS

## 2021-07-06 VITALS — HEIGHT: 65 IN | WEIGHT: 170 LBS | BODY MASS INDEX: 28.32 KG/M2

## 2021-07-11 ENCOUNTER — HEALTH MAINTENANCE LETTER (OUTPATIENT)
Age: 61
End: 2021-07-11

## 2021-07-21 ENCOUNTER — HOSPITAL ENCOUNTER (OUTPATIENT)
Dept: PHYSICAL THERAPY | Facility: REHABILITATION | Age: 61
End: 2021-07-21
Payer: MEDICARE

## 2021-07-21 DIAGNOSIS — I89.0 LYMPHEDEMA: Primary | ICD-10-CM

## 2021-07-21 PROCEDURE — 97140 MANUAL THERAPY 1/> REGIONS: CPT | Mod: GP | Performed by: PHYSICAL THERAPIST

## 2021-07-21 PROCEDURE — 97161 PT EVAL LOW COMPLEX 20 MIN: CPT | Mod: GP | Performed by: PHYSICAL THERAPIST

## 2021-07-22 NOTE — PROGRESS NOTES
Rehabilitation Services      OUTPATIENT PHYSICAL THERAPY EDEMA EVALUATION  PLAN OF TREATMENT FOR OUTPATIENT REHABILITATION  (COMPLETE FOR INITIAL CLAIMS ONLY)  Patient's Last Name, First Name, Bright Esquivel                           Provider s Name:   Jen Grigsby PT Medical Record No.  [id number]     Start of Care Date:  07/21/21   Onset Date:   (years ago but increased more recently )   Type:  PT   Medical Diagnosis:  (P) Lymphedema    Therapy Diagnosis:  (P) B LE lymphedema  Visits from SOC:  1                                     __________________________________________________________________________________   Plan of Treatment/Functional Goals:    (P) Manual lymph drainage, Gradient compression bandaging, Fit for compression garment, Exercises, Education, Precautions to prevent infection / exacerbation, Manual therapy, Skin care / precautions, Soft tissue mobilization, Myofascial release, Home management program development        GOALS  1. Goal description: (P) Pt. will be independent with home exercise program in 8 weeks        Target date: (P) 09/16/21  2. Goal description: (P) Patient will reduce limb volume by more than 5% each LE for decrease risk for infection and increase ease in donning shoes and socks and increase ease in ambulation in 12 weeks        Target date: (P) 10/13/21  3. Goal description: (P) Patient will perform, verbalize self-management of: skin care;self-monitoring;exercise;massage;self-compression;compression wear;compression care;infection prevention in 12 weeks        Target date: (P) 10/14/21            Treatment Frequency: (P) 2x/week   Treatment duration: (P) 8 (12-16 sessions )    Jen Grigsby, PT                                  I CERTIFY THE NEED FOR THESE SERVICES FURNISHED UNDER THIS PLAN OF TREATMENT AND WHILE UNDER MY CARE     (Physician signature in  associated progress note indicates review and certification of the therapy plan)                  Certification date from: (P) 07/21/21       Certification date to: (P) 10/13/21           Referring physician: Dr Soria    Initial Assessment  See Epic Evaluation- Start of care: 07/21/21 07/21/21 1500   Quick Adds   Quick Adds Certification   Rehab Discipline   Discipline PT   Type of Visit   Type of visit Initial Edema Evaluation       present No   General Information   Start of care 07/21/21   Referring physician Dr Soria    Orders Evaluate and treat as indicated   Order date 06/16/21   Medical diagnosis Lymphedema of B LEs   Edema onset   (years ago but increased more recently )   Affected body parts LLE;RLE   Edema etiology Unknown   Pertinent history of current problem (PT: include personal factors and/or comorbidities that impact the POC; OT: include additional occupational profile info) Patient has hx of cerebral palsy and walks with B lofstran crutches, B LE weakness as a result and gluacoma limiting his vision, Patient hx of L ventricular hypertrophy that is stable and had a recent ECHO done with an EF of 60% noted. Has been going to therapy for his low back pain and noted by therapist that is LEs were swollen. Patient discussed this with the MD and was referred to our clinic to have bandaging to reduce the edema.    Surgical / medical history reviewed Yes   Prior level of functional mobility walking with B Lofstran crutches    Prior treatment   (none )   Community support Family / friend caregiver   Patient role / employment history Retired;Disabled   Living environment Apartment / condo   Living environment comments Patient lives with a roommate in a apartment that is accessible and the roommate is able to help with some things    Current assistive devices Forearm crutches   Fall Risk Screen   Fall screen completed by PT   Have you fallen 2 or more times in the past year? No    Have you fallen and had an injury in the past year? No   Is patient a fall risk? Yes   Fall screen comments Patient is B LE weakness and uses the forearm crutches for walking.    Abuse Screen (yes response referral indicated)   Feels Unsafe at Home or Work/School no   Feels Threatened by Someone no   Does Anyone Try to Keep You From Having Contact with Others or Doing Things Outside Your Home? no   Physical Signs of Abuse Present no   System Outcome Measures   Outcome Measures Lymphedema   Lymphedema Life Impact Scale (score range 0-72). A higher score indicates greater impairment.   (unable to complete today )   Subjective Report   Patient report of symptoms no pain with edema, low back pain and has knee OA    Precautions   Precautions   (Ventricular hypertrophy)   Patient / Family Goals   Patient / family goals statement to decrease the swelling of the legs and be able to get shoes on with ease   Pain   Patient currently in pain No   Pain comments hx of low back pain and knee OA, no pain with the edema    Cognitive Status   Orientation Orientation to person, place and time   Level of consciousness Alert   Follows commands and answers questions 100% of the time   Personal safety and judgement Intact   Memory Intact   Edema Exam / Assessment   Skin condition Pitting   Pitting 1+   Pitting location B LEs ankles to knees    Scar No   Stemmer sign Positive   Ulceration No   Girth Measurements   Girth Measurements Refer to separate girth measurement flowsheet   Volume LE   Right LE (mL) 4260.08   Left LE (mL) 3869.06   LE volume comparison RLE volume greater than LLE volume   % difference 10.1   Range of Motion   ROM comments limited ROM of B ankles noted with some tone with Hx of cerebral palsy    Strength   Strength   (B LE weakness)   Right hand  (pounds)     Strength comments B LE weakness with cerebral palsy    Posture   Posture Forward head position;Protracted shoulders   Gait / Locomotion   Gait /  Locomotion walking with B forearm crutches    Planned Edema Interventions   Planned edema interventions Manual lymph drainage;Gradient compression bandaging;Fit for compression garment;Exercises;Education;Precautions to prevent infection / exacerbation;Manual therapy;Skin care / precautions;Soft tissue mobilization;Myofascial release;Home management program development   Clinical Impression   Criteria for skilled therapeutic intervention met Yes   Therapy diagnosis B LE lymphedema    Influenced by the following impairments / conditions Stage 1   Functional limitations due to impairments / conditions getting shoes and socks on, increased difficultry with walking due to the heaviness of the legs    Clinical Presentation Stable/Uncomplicated   Clinical Decision Making (Complexity) Low complexity   Treatment Frequency 2x/week   Treatment duration 8  (12-16 sessions )   Patient / family and/or staff in agreement with plan of care Yes   Risks and benefits of therapy have been explained Yes   Clinical impression comments Patient presents to therapy with B LE edema referred by his primary care MD Dr oSria. patient has hx of cerebral palsy with B LE weakness, ambulation with B forearm crutches, gluacoma limiting his vision and cardiac Left ventricular hypertrophy that appears stable on last Echo with EF of 60%. [place]tent has edema in B feet and lower legs noted with the R > L by about 10%. Patient will benefit from skilled therapy to decrease edema while monitoring for symptoms of SOB or pain to increase ease of donning/doffing shoes, socks and increase ease in functional mobility.    Education Assessment   Preferred learning style Listening;Demonstration   Barriers to learning Visual;Physical   Goals   Edema Eval Goals 1;2;3   Goal 1   Goal identifier HEP    Goal description Pt. will be independent with home exercise program in 8 weeks    Target date 09/16/21   Goal 2   Goal identifier Edema Reducation    Goal description  Patient will reduce limb volume by more than 5% each LE for decrease risk for infection and increase ease in donning shoes and socks and increase ease in ambulation in 12 weeks    Target date 10/13/21   Goal 3   Goal identifier Self management    Goal description Patient will perform, verbalize self-management of: skin care;self-monitoring;exercise;massage;self-compression;compression wear;compression care;infection prevention in 12 weeks    Target date 10/14/21   Total Evaluation Time   PT Eval, Low Complexity Minutes (91968) 30   Certification   Certification date from 07/21/21   Certification date to 10/13/21   Medical Diagnosis Lymphedema    Certification I certify the need for these services furnished under this plan of treatment and while under my care.  (Physician co-signature of this document indicates review and certification of the therapy plan).

## 2021-07-23 ENCOUNTER — OFFICE VISIT (OUTPATIENT)
Dept: FAMILY MEDICINE | Facility: CLINIC | Age: 61
End: 2021-07-23
Payer: MEDICARE

## 2021-07-23 VITALS
TEMPERATURE: 99.4 F | DIASTOLIC BLOOD PRESSURE: 99 MMHG | SYSTOLIC BLOOD PRESSURE: 182 MMHG | HEART RATE: 101 BPM | RESPIRATION RATE: 22 BRPM

## 2021-07-23 DIAGNOSIS — I10 ESSENTIAL HYPERTENSION: ICD-10-CM

## 2021-07-23 DIAGNOSIS — N40.1 BENIGN PROSTATIC HYPERPLASIA WITH LOWER URINARY TRACT SYMPTOMS, SYMPTOM DETAILS UNSPECIFIED: Primary | ICD-10-CM

## 2021-07-23 DIAGNOSIS — R35.0 URINARY FREQUENCY: ICD-10-CM

## 2021-07-23 LAB
ALBUMIN UR-MCNC: >=300 MG/DL
ANION GAP SERPL CALCULATED.3IONS-SCNC: 13 MMOL/L (ref 5–18)
APPEARANCE UR: CLEAR
BACTERIA #/AREA URNS HPF: NORMAL /HPF
BILIRUB UR QL STRIP: NEGATIVE
BUN SERPL-MCNC: 19 MG/DL (ref 8–22)
CALCIUM SERPL-MCNC: 9.6 MG/DL (ref 8.5–10.5)
CHLORIDE BLD-SCNC: 105 MMOL/L (ref 98–107)
CO2 SERPL-SCNC: 25 MMOL/L (ref 22–31)
COLOR UR AUTO: YELLOW
CREAT SERPL-MCNC: 1.19 MG/DL (ref 0.7–1.3)
GFR SERPL CREATININE-BSD FRML MDRD: 65 ML/MIN/1.73M2
GLUCOSE BLD-MCNC: 90 MG/DL (ref 70–125)
GLUCOSE UR STRIP-MCNC: NEGATIVE MG/DL
HGB UR QL STRIP: ABNORMAL
KETONES UR STRIP-MCNC: NEGATIVE MG/DL
LEUKOCYTE ESTERASE UR QL STRIP: NEGATIVE
NITRATE UR QL: NEGATIVE
PH UR STRIP: 5.5 [PH] (ref 5–8)
POTASSIUM BLD-SCNC: 3.7 MMOL/L (ref 3.5–5)
RBC #/AREA URNS AUTO: NORMAL /HPF
SODIUM SERPL-SCNC: 143 MMOL/L (ref 136–145)
SP GR UR STRIP: >=1.03 (ref 1–1.03)
UROBILINOGEN UR STRIP-ACNC: 0.2 E.U./DL
WBC #/AREA URNS AUTO: NORMAL /HPF

## 2021-07-23 PROCEDURE — 81001 URINALYSIS AUTO W/SCOPE: CPT | Performed by: FAMILY MEDICINE

## 2021-07-23 PROCEDURE — 36415 COLL VENOUS BLD VENIPUNCTURE: CPT | Performed by: FAMILY MEDICINE

## 2021-07-23 PROCEDURE — 80048 BASIC METABOLIC PNL TOTAL CA: CPT | Performed by: FAMILY MEDICINE

## 2021-07-23 PROCEDURE — 99214 OFFICE O/P EST MOD 30 MIN: CPT | Performed by: FAMILY MEDICINE

## 2021-07-23 RX ORDER — METOPROLOL SUCCINATE 200 MG/1
200 TABLET, EXTENDED RELEASE ORAL DAILY
Qty: 30 TABLET | Refills: 3 | Status: SHIPPED | OUTPATIENT
Start: 2021-07-23 | End: 2021-07-23

## 2021-07-23 NOTE — ASSESSMENT & PLAN NOTE
UA recheck today, negative for suggestion of UTI.  Reports that has been using tamsulosin regularly, recently started on finasteride, 6/16/2021.    Very, very frequent urination,-interferes with his life.  Will refer to urology.

## 2021-07-23 NOTE — ASSESSMENT & PLAN NOTE
Uncontrolled, losartan 100, metoprolol , furosemide 20 mg which is new.  Ongoing edema.  Proteinuria.  Pulse remains at 101.  Increase metoprolol to 200 mg.  Follow-up 4 weeks.

## 2021-07-23 NOTE — PROGRESS NOTES
Assessment/ Plan  Essential hypertension  Uncontrolled, losartan 100, metoprolol , furosemide 20 mg which is new.  Ongoing edema.  Proteinuria.  Pulse remains at 101.  Increase metoprolol to 200 mg.  Follow-up 4 weeks.    Urinary frequency  UA recheck today, negative for suggestion of UTI.  Reports that has been using tamsulosin regularly, recently started on finasteride, 6/16/2021.    Very, very frequent urination,-interferes with his life.  Will refer to urology.       Benign prostatic hyperplasia with lower urinary tract symptoms, symptom details unspecified  Tamsulosin and finasteride, very frequent urination, may deserve anticholinergic.  Will refer to urology for discussion     Subjective  CC:  chief complaint  HPI:  61-year-old  Presents for follow-up of hypertension  Also lower extremity edema.    Chief complaint though is urinary frequency.  Indicates that has been trying to get a colostomy how severe this is.  Brings in a list of, he times he has voided in the last 24 hours.  The number was about 15, 5 of them overnight.  No burning with urination.    Has been on tamsulosin for quite some time, recently started finasteride due to ongoing complaints    Patient has been using furosemide in the morning.  Still has lower extremity edema.  Going to be seen the vascular clinic in the near future.  PFSH:  Patient Active Problem List   Diagnosis     Unspecified glaucoma     Neuritis     Essential hypertension     Osteoarthritis Of The Knee     Cerebral Palsy     Left Ventricular Hypertrophy     Esophageal reflux     Aortic Sclerosis     Benign Adenomatous Polyp Of The Large Intestine     Diverticulosis     Hypercholesterolemia     Acute bilateral low back pain without sciatica     Proteinuria     Headache     Cough     Urinary frequency     Healthcare maintenance     Benign prostatic hyperplasia with lower urinary tract symptoms, symptom details unspecified     Daytime sleepiness     Cervical lymphadenopathy      Hyperglycemia     Stage 3a chronic kidney disease     Edema, unspecified type     Current medications reviewed as follows:  [unfilled]  History   Smoking Status     Never Smoker   Smokeless Tobacco     Never Used     Social History     Social History Narrative     Not on file     Patient Care Team:  Ladarius Soria MD as PCP - General  Ladarius Soria MD as Assigned PCP  ROS  As above      Objective  Physical Exam  Vitals:    07/23/21 1324   BP: (!) 182/99   BP Location: Left arm   Patient Position: Sitting   Cuff Size: Adult Large   Pulse: 101   Resp: 22   Temp: 99.4  F (37.4  C)   TempSrc: Temporal     There is no height or weight on file to calculate BMI.  Legs are wrapped to the knees bilaterally  Chest clear, cardiac exam normal.  Diagnostics:   Results for orders placed or performed in visit on 07/23/21   UA with Microscopic reflex to Culture - Clinic Collect     Status: Abnormal    Specimen: Urine, Midstream   Result Value Ref Range    Color Urine Yellow Colorless, Straw, Light Yellow, Yellow    Appearance Urine Clear Clear    Glucose Urine Negative Negative mg/dL    Bilirubin Urine Negative Negative    Ketones Urine Negative Negative mg/dL    Specific Gravity Urine >=1.030 1.005 - 1.030    Blood Urine Small (A) Negative    pH Urine 5.5 5.0 - 8.0    Protein Albumin Urine >=300 (A) Negative mg/dL    Urobilinogen Urine 0.2 0.2, 1.0 E.U./dL    Nitrite Urine Negative Negative    Leukocyte Esterase Urine Negative Negative   Urine Microscopic     Status: Normal   Result Value Ref Range    Bacteria Urine None Seen None Seen /HPF    RBC Urine None Seen 0-2 /HPF /HPF    WBC Urine None Seen 0-5 /HPF /HPF    Narrative    Urine Culture not indicated           Please note: Voice recognition software was used in this dictation.  It may therefore contain typographical errors.

## 2021-07-23 NOTE — ASSESSMENT & PLAN NOTE
Tamsulosin and finasteride, very frequent urination, may deserve anticholinergic.  Will refer to urology for discussion

## 2021-07-27 RX ORDER — METOPROLOL SUCCINATE 200 MG/1
200 TABLET, EXTENDED RELEASE ORAL DAILY
Qty: 90 TABLET | Refills: 3 | Status: SHIPPED | OUTPATIENT
Start: 2021-07-27 | End: 2022-10-20

## 2021-08-30 ENCOUNTER — TELEPHONE (OUTPATIENT)
Dept: FAMILY MEDICINE | Facility: CLINIC | Age: 61
End: 2021-08-30

## 2021-08-30 DIAGNOSIS — G80.9 INFANTILE CEREBRAL PALSY (H): Primary | ICD-10-CM

## 2021-08-30 NOTE — TELEPHONE ENCOUNTER
Please contact- would it be okay for our care coordination team to contact him to help with transportation- he really needs to follow up for his medical issues

## 2021-09-02 ENCOUNTER — PATIENT OUTREACH (OUTPATIENT)
Dept: CARE COORDINATION | Facility: CLINIC | Age: 61
End: 2021-09-02

## 2021-09-02 NOTE — LETTER
[facility] CARE COORDINATION  [address] [facility] [address] [facility] [address]    September 3, 2021    Bright Lockett  [address] [place] [address]      Dear Bright,    I am a clinic community health worker who works with Ladarius Soria MD at [facility]   I have been trying to reach you recently to introduce Clinic Care Coordination and to see if there was anything I could assist you with.  Below is a description of clinic care coordination and how I can further assist you.      The clinic care coordination team is made up of a registered nurse,  and community health worker who understand the health care system. The goal of clinic care coordination is to help you manage your health and improve access to the health care system in the most efficient manner.   The team can assist you in meeting your health care goals by providing education, coordinating services, strengthening the communication among your providers and supporting you with any resource needs.    Please feel free to contact me at [phone] with any questions or concerns. We are focused on providing you with the highest-quality healthcare experience possible and that all starts with you.         Sincerely,       Lina Cabral  Community Health Worker  [facility]  Clinic Care Coordination  [email or web address]  [email or web address]   Office: [phone]  Fax: [phone]

## 2021-09-02 NOTE — PROGRESS NOTES
9/2/2021  Clinic Care Coordination Contact  Community Health Worker Initial Outreach    CHW Initial Information Gathering:  Referral Source: PCP  Preferred Urgent Care: [facility], [phone]  No PCP office visit in Past Year: No  CHW Additional Questions  If ED/Hospital discharge, follow-up appointment scheduled as recommended?: N/A  Medication changes made following ED/Hospital discharge?: N/A  MyChart active?: Yes    UTC/Voicemail    Clinical Data: Care Coordinator Outreach enrollment     Outreach attempted x 1.    Left message on patient's voicemail with call back information and requested return call.    Plan:  Care Coordinator will try to reach patient again in 1-2 business days.    CHW Outreach: 9-3-21

## 2021-09-03 NOTE — PROGRESS NOTES
9/3/2021  Clinic Care Coordination Contact  Community Health Worker Initial Outreach    UTC/Voicemail    Referral Source: PCP  Clinical Data: Care Coordinator Outreach enrollment    Outreach attempted x 2.  Left message on patient's voicemail with call back information and requested return call.  Plan: Care Coordinator will send care coordination introduction letter with care coordinator contact information and explanation of care coordination services via [email or web address].    Care Coordinator will do no further outreaches at this time.

## 2021-09-05 ENCOUNTER — HEALTH MAINTENANCE LETTER (OUTPATIENT)
Age: 61
End: 2021-09-05

## 2021-09-10 NOTE — PROGRESS NOTES
Outpatient Physical Therapy Discharge Note     Patient: Bright Lockett  : 1960    Beginning/End Dates of Reporting Period:  21 to 9/10/2021    Referring Provider: Dr Soria    Therapy Diagnosis: Lymphedema      Client Self Report: see initial evaluation     Objective Measurements:  No new measures from initial evaluation as patient did not return to therapy     Outcome Measures (most recent score):  Lymphedema Life Impact Scale (score range 0-72). A higher score indicates greater impairment.:  (unable to complete today )    Goals:  Goal Identifier HEP    Goal Description Pt. will be independent with home exercise program in 8 weeks    Target Date 21   Date Met      Progress (detail required for progress note):       Goal Identifier Edema Reducation    Goal Description Patient will reduce limb volume by more than 5% each LE for decrease risk for infection and increase ease in donning shoes and socks and increase ease in ambulation in 12 weeks    Target Date 10/13/21   Date Met      Progress (detail required for progress note):       Goal Identifier Self management    Goal Description Patient will perform, verbalize self-management of: skin care;self-monitoring;exercise;massage;self-compression;compression wear;compression care;infection prevention in 12 weeks    Target Date 10/14/21   Date Met      Progress (detail required for progress note):     No progress made towards goals due to patient not returning to therapy     Plan:  Discharge from therapy.    Discharge:    Reason for Discharge: Patient cancelled all remaining follow up sessions due to lack of transportation, he has not called to reschedule and will now be discharged from therapy.     Equipment Issued: none     Discharge Plan: Patient to continue home program.    Jen Grigsby, PT, DPT, [facility]

## 2021-09-10 NOTE — ADDENDUM NOTE
Encounter addended by: Jen Grigsby, PT on: 9/10/2021 10:28 AM   Actions taken: Clinical Note Signed, Episode resolved

## 2021-10-04 ENCOUNTER — MYC REFILL (OUTPATIENT)
Dept: FAMILY MEDICINE | Facility: CLINIC | Age: 61
End: 2021-10-04

## 2021-10-04 ENCOUNTER — MYC MEDICAL ADVICE (OUTPATIENT)
Dept: FAMILY MEDICINE | Facility: CLINIC | Age: 61
End: 2021-10-04

## 2021-10-04 DIAGNOSIS — N40.1 BENIGN PROSTATIC HYPERPLASIA WITH LOWER URINARY TRACT SYMPTOMS, SYMPTOM DETAILS UNSPECIFIED: ICD-10-CM

## 2021-10-04 DIAGNOSIS — I10 ESSENTIAL HYPERTENSION: ICD-10-CM

## 2021-10-04 DIAGNOSIS — R60.9 EDEMA, UNSPECIFIED TYPE: ICD-10-CM

## 2021-10-04 DIAGNOSIS — E87.6 HYPOKALEMIA: Primary | ICD-10-CM

## 2021-10-04 NOTE — TELEPHONE ENCOUNTER
Pending Prescriptions:                       Disp   Refills    finasteride (PROSCAR) 5 MG tablet         90 tab*0            Sig: [FINASTERIDE (PROSCAR) 5 MG TABLET] TAKE 1           TABLET(5 MG) BY MOUTH DAILY    potassium chloride ER (K-TAB/KLOR-CON) 10*                    Sig: Take by mouth 2 times daily

## 2021-10-04 NOTE — TELEPHONE ENCOUNTER
"Routing refill request to provider for review/approval because:  Labs out of range:  BP    Last Written Prescription Date:  6/16/21  Last Fill Quantity: 90,  # refills: 0   Last office visit provider:  7/23/21     Requested Prescriptions   Pending Prescriptions Disp Refills     furosemide (LASIX) 20 MG tablet 90 tablet 0       Diuretics (Including Combos) Protocol Failed - 10/4/2021 12:43 AM        Failed - Blood pressure under 140/90 in past 12 months     BP Readings from Last 3 Encounters:   07/23/21 (!) 182/99   06/16/21 (!) 189/96   03/31/21 (!) 180/82                 Passed - Recent (12 mo) or future (30 days) visit within the authorizing provider's specialty     Patient has had an office visit with the authorizing provider or a provider within the authorizing providers department within the previous 12 mos or has a future within next 30 days. See \"Patient Info\" tab in inbasket, or \"Choose Columns\" in Meds & Orders section of the refill encounter.              Passed - Medication is active on med list        Passed - Patient is age 18 or older        Passed - Normal serum creatinine on file in past 12 months     Recent Labs   Lab Test 07/23/21  1408   CR 1.19              Passed - Normal serum potassium on file in past 12 months     Recent Labs   Lab Test 07/23/21  1408   POTASSIUM 3.7                    Passed - Normal serum sodium on file in past 12 months     Recent Labs   Lab Test 07/23/21  1408                      Torito Park RN 10/04/21 8:43 AM  "

## 2021-10-05 RX ORDER — FUROSEMIDE 20 MG
TABLET ORAL
Qty: 90 TABLET | Refills: 0 | Status: SHIPPED | OUTPATIENT
Start: 2021-10-05 | End: 2022-07-19

## 2021-10-05 RX ORDER — POTASSIUM CHLORIDE 750 MG/1
10 TABLET, EXTENDED RELEASE ORAL 2 TIMES DAILY
Qty: 180 TABLET | Refills: 3 | Status: SHIPPED | OUTPATIENT
Start: 2021-10-05 | End: 2022-10-20

## 2021-10-05 RX ORDER — FINASTERIDE 5 MG/1
TABLET, FILM COATED ORAL
Qty: 90 TABLET | Refills: 0 | Status: SHIPPED | OUTPATIENT
Start: 2021-10-05 | End: 2022-08-03

## 2021-10-05 RX ORDER — TAMSULOSIN HYDROCHLORIDE 0.4 MG/1
CAPSULE ORAL
Qty: 90 CAPSULE | Refills: 3 | Status: SHIPPED | OUTPATIENT
Start: 2021-10-05 | End: 2022-10-20

## 2021-10-05 RX ORDER — FUROSEMIDE 20 MG
20 TABLET ORAL DAILY
Qty: 90 TABLET | Refills: 4 | Status: SHIPPED | OUTPATIENT
Start: 2021-10-05 | End: 2022-07-14

## 2021-10-05 NOTE — TELEPHONE ENCOUNTER
"Routing refill request to provider for review/approval because:  Med not active on medication list (potassium).    Last Written Prescription:          Last office visit provider:  7/23/21    Requested Prescriptions   Pending Prescriptions Disp Refills     finasteride (PROSCAR) 5 MG tablet 90 tablet 0     Sig: [FINASTERIDE (PROSCAR) 5 MG TABLET] TAKE 1 TABLET(5 MG) BY MOUTH DAILY       BPH Agents Passed - 10/4/2021  3:01 PM        Passed - Recent (12 mo) or future (30 days) visit within the authorizing provider's department     Patient has had an office visit with the authorizing provider or a provider within the authorizing providers department within the previous 12 mos or has a future within next 30 days. See \"Patient Info\" tab in inbasket, or \"Choose Columns\" in Meds & Orders section of the refill encounter.              Passed - Medication is active on med list        Passed - Patient is 18 years of age or older           potassium chloride ER (K-TAB/KLOR-CON) 10 MEQ CR tablet       Sig: Take by mouth 2 times daily       Potassium Supplements Protocol Failed - 10/4/2021  3:01 PM        Failed - Medication is active on med list        Passed - Recent (12 mo) or future (30 days) visit within the authorizing provider's department     Patient has had an office visit with the authorizing provider or a provider within the authorizing providers department within the previous 12 mos or has a future within next 30 days. See \"Patient Info\" tab in inbasket, or \"Choose Columns\" in Meds & Orders section of the refill encounter.              Passed - Patient is age 18 or older        Passed - Normal serum potassium in past 12 months     Recent Labs   Lab Test 07/23/21  1408   POTASSIUM 3.7                         Juliette Antony RN 10/04/21 9:51 PM  "

## 2021-10-05 NOTE — TELEPHONE ENCOUNTER
Patient called to check on the status of this refill request. He is is running low of medication. Please send refills to pharmacy, and patient is requesting a call when this is completed. He has to arrange transportation to go  his medication.

## 2022-07-14 ENCOUNTER — OFFICE VISIT (OUTPATIENT)
Dept: FAMILY MEDICINE | Facility: CLINIC | Age: 62
End: 2022-07-14
Payer: MEDICARE

## 2022-07-14 VITALS
WEIGHT: 169.25 LBS | TEMPERATURE: 97.7 F | SYSTOLIC BLOOD PRESSURE: 166 MMHG | OXYGEN SATURATION: 96 % | HEART RATE: 59 BPM | BODY MASS INDEX: 28.38 KG/M2 | DIASTOLIC BLOOD PRESSURE: 77 MMHG | RESPIRATION RATE: 28 BRPM

## 2022-07-14 DIAGNOSIS — G80.9 INFANTILE CEREBRAL PALSY (H): ICD-10-CM

## 2022-07-14 DIAGNOSIS — Z00.00 HEALTHCARE MAINTENANCE: Primary | ICD-10-CM

## 2022-07-14 DIAGNOSIS — I13.0 HYPERTENSIVE HEART AND CHRONIC KIDNEY DISEASE WITH HEART FAILURE AND STAGE 1 THROUGH STAGE 4 CHRONIC KIDNEY DISEASE, OR UNSPECIFIED CHRONIC KIDNEY DISEASE (H): ICD-10-CM

## 2022-07-14 DIAGNOSIS — R60.9 EDEMA, UNSPECIFIED TYPE: ICD-10-CM

## 2022-07-14 DIAGNOSIS — I10 ESSENTIAL HYPERTENSION: ICD-10-CM

## 2022-07-14 DIAGNOSIS — N18.31 STAGE 3A CHRONIC KIDNEY DISEASE (H): ICD-10-CM

## 2022-07-14 DIAGNOSIS — N40.1 BENIGN PROSTATIC HYPERPLASIA WITH LOWER URINARY TRACT SYMPTOMS, SYMPTOM DETAILS UNSPECIFIED: ICD-10-CM

## 2022-07-14 DIAGNOSIS — E78.00 HYPERCHOLESTEROLEMIA: ICD-10-CM

## 2022-07-14 PROBLEM — I51.7 LEFT VENTRICULAR HYPERTROPHY: Status: ACTIVE | Noted: 2022-07-14

## 2022-07-14 PROBLEM — I50.30 HEART FAILURE WITH PRESERVED EJECTION FRACTION (H): Status: ACTIVE | Noted: 2022-06-10

## 2022-07-14 LAB
ALT SERPL W P-5'-P-CCNC: 16 U/L (ref 10–50)
ANION GAP SERPL CALCULATED.3IONS-SCNC: 8 MMOL/L (ref 7–15)
BUN SERPL-MCNC: 24 MG/DL (ref 8–23)
CALCIUM SERPL-MCNC: 9.5 MG/DL (ref 8.8–10.2)
CHLORIDE SERPL-SCNC: 105 MMOL/L (ref 98–107)
CREAT SERPL-MCNC: 1.18 MG/DL (ref 0.67–1.17)
DEPRECATED HCO3 PLAS-SCNC: 31 MMOL/L (ref 22–29)
GFR SERPL CREATININE-BSD FRML MDRD: 69 ML/MIN/1.73M2
GLUCOSE SERPL-MCNC: 99 MG/DL (ref 70–99)
HGB BLD-MCNC: 16.3 G/DL (ref 13.3–17.7)
LDLC SERPL DIRECT ASSAY-MCNC: 111 MG/DL
POTASSIUM SERPL-SCNC: 4.4 MMOL/L (ref 3.4–5.3)
SODIUM SERPL-SCNC: 144 MMOL/L (ref 136–145)

## 2022-07-14 PROCEDURE — 0054A COVID-19,PF,PFIZER (12+ YRS): CPT | Performed by: FAMILY MEDICINE

## 2022-07-14 PROCEDURE — 84460 ALANINE AMINO (ALT) (SGPT): CPT | Performed by: FAMILY MEDICINE

## 2022-07-14 PROCEDURE — 36415 COLL VENOUS BLD VENIPUNCTURE: CPT | Performed by: FAMILY MEDICINE

## 2022-07-14 PROCEDURE — 91305 COVID-19,PF,PFIZER (12+ YRS): CPT | Performed by: FAMILY MEDICINE

## 2022-07-14 PROCEDURE — 80048 BASIC METABOLIC PNL TOTAL CA: CPT | Performed by: FAMILY MEDICINE

## 2022-07-14 PROCEDURE — 85018 HEMOGLOBIN: CPT | Performed by: FAMILY MEDICINE

## 2022-07-14 PROCEDURE — 99214 OFFICE O/P EST MOD 30 MIN: CPT | Mod: 25 | Performed by: FAMILY MEDICINE

## 2022-07-14 PROCEDURE — 83721 ASSAY OF BLOOD LIPOPROTEIN: CPT | Performed by: FAMILY MEDICINE

## 2022-07-14 RX ORDER — OMEPRAZOLE 10 MG/1
10 CAPSULE, DELAYED RELEASE ORAL
COMMUNITY
End: 2024-04-23

## 2022-07-14 NOTE — ASSESSMENT & PLAN NOTE
Limited in what diuretics will do, renal function started going up with diuresis in the hospital.  Recommend compression of lower extremities.  Has compression wraps which is roommate has helped him with in the past.  Prescription written for compression stockings, will need help with this as well.

## 2022-07-14 NOTE — ASSESSMENT & PLAN NOTE
Severe urinary urgency and urge incontinence.  On finasteride and tamsulosin.  Referral to urology

## 2022-07-14 NOTE — ASSESSMENT & PLAN NOTE
Well-controlled in the hospital  Well-controlled when he was checking it at home  Very high here      Hypertensive heart and kidney disease    Currently taking losartan 100, metoprolol , furosemide 20    I recommend that he resume checking blood pressures regularly.  Bring in machine follow-up 6 to 8 weeks for review.

## 2022-07-14 NOTE — PROGRESS NOTES
Assessment/ Plan  Stage 3a chronic kidney disease  Very inconsistent creatinines, unclear what baseline kidney function is.  Check BMP    Hypertensive heart and chronic kidney disease with heart failure and stage 1 through stage 4 chronic kidney disease, or unspecified chronic kidney disease (H)  See discussion elsewhere    Hypercholesterolemia  Atorvastatin 20, check ALT and direct LDL    Essential hypertension  Well-controlled in the hospital  Well-controlled when he was checking it at home  Very high here      Hypertensive heart and kidney disease    Currently taking losartan 100, metoprolol , furosemide 20    I recommend that he resume checking blood pressures regularly.  Bring in machine follow-up 6 to 8 weeks for review.    Edema, unspecified type  Limited in what diuretics will do, renal function started going up with diuresis in the hospital.  Recommend compression of lower extremities.  Has compression wraps which is roommate has helped him with in the past.  Prescription written for compression stockings, will need help with this as well.    Cerebral Palsy  Stable, uses wrist brace canes for walking.    Benign prostatic hyperplasia with lower urinary tract symptoms, symptom details unspecified  Severe urinary urgency and urge incontinence.  On finasteride and tamsulosin.  Referral to urology     Subjective  CC:  chief complaint  HPI:  Patient is here for variety of issues.  Very difficult historian.  Here with his roommate Lonnie today.    Feeling reasonably well.  Hospitalized as below, [facility]    1 major problem is severe urinary frequency.  Has BPH and frequency, urgency and leakage of urine.  Really cannot travel much because of risk of incontinence.  Now on diuretic as well.  For example, was it guided to his mom's memorial service by his sister who really wants him to come but he is hesitant because he worries that he cannot get through it.   "Has never used incontinence pads or diapers.    Continues to complain of lower extremity edema.  Moderate in severity.      Does not check blood pressure regularly.        Admit Date: 6/7/2022 3:59 PM  Discharge Date: 6/10/2022  Date of Service: 6/10/2022  Service: Medicine General   Staff MD: Laura Mcmullen MD  Condition at D/C: stable    The information in this report was adapted from chart review (H&P, progress notes) & discussion with the patient.    Final Diagnoses   Primary:  Acute on chronic HFpEF    Secondary:   HTN  CKD stage III  Urinary retention    Issues for Outpatient Follow-up     Pending Results: none    Other Issues for Outpatient Follow-up:   1. BMP in 1 week      Brief Summary of Presentation:   From H&P: \" 62 y.o. male with a past medical history significant for cerebral palsy, chronic diastolic heart failure, HTN and CKD Stage 3a who presented to the ED w/ c/o of increased lower extremity edema and chest pain.     Patient is a difficult historian. Endorses to shortness of breath x 2 days that worsened last night to the point where he could not lay down without coughing. Has also noticed increased bilateral lower extremity swelling. Denies taking Lasix, but is on medication list. No associated fevers or chills. Also endorses to increased chest pain that occurs at rest. Describes it as a dull ache in his left chest. No radiation into his arms, but does go across the chest. The last episode was 2 nights ago. Describes a sedentary life style and not able to tell me clearly if the chest pain worsens with exertion. Notes some increased indigestion last night after dinner of beans and toast.      Had an echo 1 year ago through the [facility] system that was notable for normal biventricular function w/ EF of 60% and mild to moderate concentric increase in left ventricular wall thickness.     In the ED, patient was afebrile, hemodynamically stable, maintaining adequate O2 saturations on room air. " "Initial EKG sinus rhythm with T inversions in leads I, II, aVL, V4-V6. No previous EKG tracing in our system, however previous read outs in Care Everywhere note ST and T wave abnormalities in anterior, inferior and lateral leads. ED repeated EKG about 1 hour later and were concerned for more biphasic changes in the T waves. Troponin x 1 WNL. BNP elevated to 615. BMP and CBC unremarkable. COVID/Influenza not detected. CXR w/ small bilateral pleural effusions and pulmonary vascular congestion. Patient was given a full dose aspirin and was given furosemide IV 40 mg x 1. Patient did have a palumbo placed while in the ED for presumed urinary retention. \"      Procedures/Significant Imaging & Testing:   None    Hospital Course   1. Acute on chronic HFpEF -clinically improved with IV diuresis. Creat bumped 6/9/2022 and diuretics held. PTA on lasix 20 mg daily but unclear if he was taking. Pt tells me that he takes HCTZ, but it isn't on his active med list when I look. Echo had normal EF and only mild to moderate AR and MR. Wt 165 lbs on dc and appears euvolemic. Will dc on po lasix 20 mg daily. Will need close outpt follow up and BMP within 1 week to monitor RF and lytes.      2. Chest pain - negative troponins. His story is somewhat atypical. Resolved s/p diuresis. Consider outpt stress, last one was in 2011.      3. HTN - continue metoprolol and ARB upon dc.      4. CKD stage III - baseline creat 1.2??-->1.3 with diuresis, improved to 1.1 after holding ARB+diuretics. BMP in 1 week.      5. Urinary retention, resolved- condom cath in place. Low PVR on bladder scan day of dc.         ------------------------------------------    Discharge Medications:    Discharge Medication List as of 6/10/2022 4:20 PM     CONTINUE these medications which have CHANGED   Details   furosemide (LASIX) 20 MG tablet Take 1 Tablet (20 mg) by mouth daily., Disp-30 Tablet, R-0, DAILY Starting Fri 6/10/2022, Oral, Normal       CONTINUE these " medications which have NOT CHANGED   Details   losartan (COZAAR) 100 MG tablet Take 100 mg by mouth daily., DAILY, Oral, Historical     metoprolol succinate (TOPROL-XL) 200 MG 24 hour release tablet Take 200 mg by mouth daily., DAILY, Oral, Historical     omeprazole (PRILOSEC) 10 MG capsule Take 10 mg by mouth daily. Take 1 hour before a meal., DAILY, Oral, Historical     potassium chloride SA (KLOR-CON) 10 MEQ controlled release tablet Take 10 mEq by mouth two times a day., BID, Oral, Historical     tamsulosin (FLOMAX) 0.4 MG CAPS capsule Take 0.4 mg by mouth daily., DAILY, Oral, Historical              DME (Durable Medical Equipment) Orders and Documentation  Orders Placed This Encounter   Procedures     Compression Sleeve/Stocking Order for DME - ONLY FOR DME      The patient was assessed and it was determined the patient is in need of the following listed DME Supplies/Equipment. Please complete supporting documentation below to demonstrate medical necessity.          PFSH:  Patient Active Problem List   Diagnosis     Unspecified glaucoma     Neuritis     Essential hypertension     Osteoarthritis Of The Knee     Cerebral Palsy     Left Ventricular Hypertrophy     Esophageal reflux     Aortic Sclerosis     Benign Adenomatous Polyp Of The Large Intestine     Diverticulosis     Hypercholesterolemia     Acute bilateral low back pain without sciatica     Proteinuria     Headache     Cough     Urinary frequency     Healthcare maintenance     Benign prostatic hyperplasia with lower urinary tract symptoms, symptom details unspecified     Daytime sleepiness     Cervical lymphadenopathy     Hyperglycemia     Stage 3a chronic kidney disease (H)     Edema, unspecified type     Heart failure with preserved ejection fraction (H)     Left ventricular hypertrophy     Personal history of colonic polyps     Hypertensive heart and chronic kidney disease with heart failure and stage 1 through stage 4 chronic kidney disease, or  unspecified chronic kidney disease (H)     ascorbic acid, vitamin C, (VITAMIN C) 1000 MG tablet, [ASCORBIC ACID, VITAMIN C, (VITAMIN C) 1000 MG TABLET] Take 1,000 mg by mouth daily.  atorvastatin (LIPITOR) 20 MG tablet, [ATORVASTATIN (LIPITOR) 20 MG TABLET] Take 1 tablet (20 mg total) by mouth at bedtime.  calcium-magnesium-zinc Tab, [CALCIUM-MAGNESIUM-ZINC TAB] 1 tablet daily.  cyclobenzaprine (FLEXERIL) 5 MG tablet, [CYCLOBENZAPRINE (FLEXERIL) 5 MG TABLET] Take 1 tablet (5 mg total) by mouth 3 (three) times a day as needed for muscle spasms.  finasteride (PROSCAR) 5 MG tablet, [FINASTERIDE (PROSCAR) 5 MG TABLET] TAKE 1 TABLET(5 MG) BY MOUTH DAILY  fluticasone (ALLERGY RELIEF, FLUTICASONE,) 50 mcg/actuation nasal spray, [FLUTICASONE (ALLERGY RELIEF, FLUTICASONE,) 50 MCG/ACTUATION NASAL SPRAY] 2 sprays each nostril once daily  furosemide (LASIX) 20 MG tablet, Take 1 tablet (20 mg) by mouth daily  furosemide (LASIX) 20 MG tablet, [FUROSEMIDE (LASIX) 20 MG TABLET] TAKE 1 TABLET(20 MG) BY MOUTH DAILY  HYDROcodone-acetaminophen 5-325 mg per tablet, [HYDROCODONE-ACETAMINOPHEN 5-325 MG PER TABLET] Take 1 tablet by mouth every 6 (six) hours as needed for pain.  loratadine (CLARITIN) 10 mg tablet, [LORATADINE (CLARITIN) 10 MG TABLET] Take 1 tablet (10 mg total) by mouth daily.  losartan (COZAAR) 100 MG tablet, [LOSARTAN (COZAAR) 100 MG TABLET] Take 1 tablet (100 mg total) by mouth daily.  metoprolol succinate ER (TOPROL-XL) 200 MG 24 hr tablet, Take 1 tablet (200 mg) by mouth daily  omeprazole (PRILOSEC) 10 MG DR capsule, Take 10 mg by mouth  omeprazole (PRILOSEC) 20 MG capsule, [OMEPRAZOLE (PRILOSEC) 20 MG CAPSULE] Take 1 capsule (20 mg total) by mouth daily.  potassium chloride ER (K-TAB/KLOR-CON) 10 MEQ CR tablet, Take 1 tablet (10 mEq) by mouth 2 times daily  tamsulosin (FLOMAX) 0.4 MG capsule, [TAMSULOSIN (FLOMAX) 0.4 MG CAP] TAKE 1 CAPSULE(0.4 MG) BY MOUTH DAILY AFTER BREAKFAST  timolol maleate (TIMOPTIC) 0.5 %  ophthalmic solution, [TIMOLOL MALEATE (TIMOPTIC) 0.5 % OPHTHALMIC SOLUTION] Administer 1 drop to both eyes daily.  metoprolol succinate (TOPROL-XL) 50 MG 24 hr tablet, [METOPROLOL SUCCINATE (TOPROL-XL) 50 MG 24 HR TABLET] TAKE 1 TABLET(50 MG) BY MOUTH DAILY    No current facility-administered medications on file prior to visit.       History   Smoking Status     Never Smoker   Smokeless Tobacco     Never Used     Social History     Social History Narrative     Not on file     Patient Care Team:  Ladarius Soria MD as PCP - General  Ladarius Soria MD as Assigned PCP  ROS  As above      Objective  Physical Exam  Vitals:    07/14/22 1107 07/14/22 1201   BP: (!) 187/93 (!) 166/77   BP Location: Right arm    Patient Position: Sitting    Cuff Size: Adult Regular    Pulse: 59    Resp: 28    Temp: 97.7  F (36.5  C)    SpO2: 96%    Weight: 76.8 kg (169 lb 4 oz)      Body mass index is 28.38 kg/m .  Walking with crutches/cane  Chest clear to auscultation, cardiac exam is regular  2+ lower extremity edema.  Slightly pitting  Diagnostics:   None      Please note: Voice recognition software was used in this dictation.  It may therefore contain typographical errors.

## 2022-07-16 PROCEDURE — 82043 UR ALBUMIN QUANTITATIVE: CPT | Performed by: FAMILY MEDICINE

## 2022-07-18 LAB
CREAT UR-MCNC: 105 MG/DL
MICROALBUMIN UR-MCNC: 342 MG/L
MICROALBUMIN/CREAT UR: 325.71 MG/G CR (ref 0–17)

## 2022-07-19 ENCOUNTER — MYC REFILL (OUTPATIENT)
Dept: FAMILY MEDICINE | Facility: CLINIC | Age: 62
End: 2022-07-19

## 2022-07-19 DIAGNOSIS — R60.9 EDEMA, UNSPECIFIED TYPE: ICD-10-CM

## 2022-07-19 DIAGNOSIS — I10 ESSENTIAL HYPERTENSION: ICD-10-CM

## 2022-07-20 RX ORDER — FUROSEMIDE 20 MG
TABLET ORAL
Qty: 90 TABLET | Refills: 0 | Status: SHIPPED | OUTPATIENT
Start: 2022-07-20 | End: 2023-01-10

## 2022-07-20 RX ORDER — LOSARTAN POTASSIUM 100 MG/1
100 TABLET ORAL DAILY
Qty: 90 TABLET | Refills: 3 | Status: SHIPPED | OUTPATIENT
Start: 2022-07-20 | End: 2023-12-05

## 2022-07-20 NOTE — TELEPHONE ENCOUNTER
"Routing refill request to provider for review/approval because:  Labs out of range:  Creatinine  A break in medication  BP not in range.    Last Written Prescription Date:  10/5/21  Last Fill Quantity: 90,  # refills: 0   Last office visit provider:  7/14/22     Requested Prescriptions   Pending Prescriptions Disp Refills     furosemide (LASIX) 20 MG tablet 90 tablet 0     Sig: [FUROSEMIDE (LASIX) 20 MG TABLET] TAKE 1 TABLET(20 MG) BY MOUTH DAILY       Diuretics (Including Combos) Protocol Failed - 7/20/2022 11:03 AM        Failed - Blood pressure under 140/90 in past 12 months     BP Readings from Last 3 Encounters:   07/14/22 (!) 166/77   07/23/21 (!) 182/99   06/16/21 (!) 189/96                 Failed - Normal serum creatinine on file in past 12 months     Recent Labs   Lab Test 07/14/22  1217   CR 1.18*              Passed - Recent (12 mo) or future (30 days) visit within the authorizing provider's specialty     Patient has had an office visit with the authorizing provider or a provider within the authorizing providers department within the previous 12 mos or has a future within next 30 days. See \"Patient Info\" tab in inbasket, or \"Choose Columns\" in Meds & Orders section of the refill encounter.              Passed - Medication is active on med list        Passed - Patient is age 18 or older        Passed - Normal serum potassium on file in past 12 months     Recent Labs   Lab Test 07/14/22  1217   POTASSIUM 4.4                    Passed - Normal serum sodium on file in past 12 months     Recent Labs   Lab Test 07/14/22  1217                      Torito Park RN 07/20/22 11:03 AM  "

## 2022-07-20 NOTE — TELEPHONE ENCOUNTER
"Routing refill request to provider for review/approval because:  Labs out of range:  Creatinine  BP not in range.    Last Written Prescription Date:  4/16/21  Last Fill Quantity: 90,  # refills: 3   Last office visit provider:  7/14/22     Requested Prescriptions   Pending Prescriptions Disp Refills     losartan (COZAAR) 100 MG tablet 90 tablet 3     Sig: Take 1 tablet (100 mg) by mouth daily       Angiotensin-II Receptors Failed - 7/20/2022  7:46 AM        Failed - Last blood pressure under 140/90 in past 12 months     BP Readings from Last 3 Encounters:   07/14/22 (!) 166/77   07/23/21 (!) 182/99   06/16/21 (!) 189/96                 Failed - Normal serum creatinine on file in past 12 months     Recent Labs   Lab Test 07/14/22  1217   CR 1.18*       Ok to refill medication if creatinine is low          Passed - Recent (12 mo) or future (30 days) visit within the authorizing provider's specialty     Patient has had an office visit with the authorizing provider or a provider within the authorizing providers department within the previous 12 mos or has a future within next 30 days. See \"Patient Info\" tab in inbasket, or \"Choose Columns\" in Meds & Orders section of the refill encounter.              Passed - Medication is active on med list        Passed - Patient is age 18 or older        Passed - Normal serum potassium on file in past 12 months     Recent Labs   Lab Test 07/14/22  1217   POTASSIUM 4.4                         Torito Park RN 07/20/22 7:46 AM  "

## 2022-08-02 DIAGNOSIS — N40.1 BENIGN PROSTATIC HYPERPLASIA WITH LOWER URINARY TRACT SYMPTOMS, SYMPTOM DETAILS UNSPECIFIED: ICD-10-CM

## 2022-08-03 RX ORDER — FINASTERIDE 5 MG/1
TABLET, FILM COATED ORAL
Qty: 90 TABLET | Refills: 3 | Status: SHIPPED | OUTPATIENT
Start: 2022-08-03 | End: 2023-12-08

## 2022-08-03 NOTE — TELEPHONE ENCOUNTER
"Routing refill request to provider for review/approval because:  A break in medication    Last Written Prescription Date:  10/5/21  Last Fill Quantity: 90,  # refills: 0   Last office visit provider: 7/14/22      Requested Prescriptions   Pending Prescriptions Disp Refills     finasteride (PROSCAR) 5 MG tablet 90 tablet 0     Sig: [FINASTERIDE (PROSCAR) 5 MG TABLET] TAKE 1 TABLET(5 MG) BY MOUTH DAILY       BPH Agents Passed - 8/2/2022  9:31 AM        Passed - Recent (12 mo) or future (30 days) visit within the authorizing provider's department     Patient has had an office visit with the authorizing provider or a provider within the authorizing providers department within the previous 12 mos or has a future within next 30 days. See \"Patient Info\" tab in inbasket, or \"Choose Columns\" in Meds & Orders section of the refill encounter.              Passed - Medication is active on med list        Passed - Patient is 18 years of age or older             Fabiana Garcia RN 08/03/22 11:57 AM  "

## 2022-08-07 ENCOUNTER — HEALTH MAINTENANCE LETTER (OUTPATIENT)
Age: 62
End: 2022-08-07

## 2022-10-19 DIAGNOSIS — E87.6 HYPOKALEMIA: ICD-10-CM

## 2022-10-19 DIAGNOSIS — N40.1 BENIGN PROSTATIC HYPERPLASIA WITH LOWER URINARY TRACT SYMPTOMS, SYMPTOM DETAILS UNSPECIFIED: ICD-10-CM

## 2022-10-19 DIAGNOSIS — I10 ESSENTIAL HYPERTENSION: ICD-10-CM

## 2022-10-20 RX ORDER — TAMSULOSIN HYDROCHLORIDE 0.4 MG/1
CAPSULE ORAL
Qty: 90 CAPSULE | Refills: 3 | Status: SHIPPED | OUTPATIENT
Start: 2022-10-20 | End: 2023-12-05

## 2022-10-20 RX ORDER — POTASSIUM CHLORIDE 750 MG/1
10 TABLET, EXTENDED RELEASE ORAL 2 TIMES DAILY
Qty: 180 TABLET | Refills: 2 | Status: SHIPPED | OUTPATIENT
Start: 2022-10-20 | End: 2023-07-05

## 2022-10-20 RX ORDER — METOPROLOL SUCCINATE 200 MG/1
200 TABLET, EXTENDED RELEASE ORAL DAILY
Qty: 90 TABLET | Refills: 3 | Status: SHIPPED | OUTPATIENT
Start: 2022-10-20 | End: 2023-12-05

## 2022-10-20 NOTE — TELEPHONE ENCOUNTER
"potassium chloride ER (K-TAB/KLOR-CON) 10 MEQ CR tablet  Last Written Prescription Date:  10/5/21  Last Fill Quantity: 180,  # refills: 3   Last office visit provider:  7/14/22     Requested Prescriptions   Pending Prescriptions Disp Refills     potassium chloride ER (K-TAB/KLOR-CON) 10 MEQ CR tablet 180 tablet 3     Sig: Take 1 tablet (10 mEq) by mouth 2 times daily       Potassium Supplements Protocol Passed - 10/19/2022  4:34 PM        Passed - Recent (12 mo) or future (30 days) visit within the authorizing provider's department     Patient has had an office visit with the authorizing provider or a provider within the authorizing providers department within the previous 12 mos or has a future within next 30 days. See \"Patient Info\" tab in inbasket, or \"Choose Columns\" in Meds & Orders section of the refill encounter.              Passed - Medication is active on med list        Passed - Patient is age 18 or older        Passed - Normal serum potassium in past 12 months     Recent Labs   Lab Test 07/14/22  1217   POTASSIUM 4.4                       tamsulosin (FLOMAX) 0.4 MG capsule 90 capsule 3     Sig: [TAMSULOSIN (FLOMAX) 0.4 MG CAP] TAKE 1 CAPSULE(0.4 MG) BY MOUTH DAILY AFTER BREAKFAST       Alpha Blockers Failed - 10/19/2022  4:34 PM        Failed - Blood pressure under 140/90 in past 12 months     BP Readings from Last 3 Encounters:   07/14/22 (!) 166/77   07/23/21 (!) 182/99   06/16/21 (!) 189/96                 Passed - Recent (12 mo) or future (30 days) visit within the authorizing provider's specialty     Patient has had an office visit with the authorizing provider or a provider within the authorizing providers department within the previous 12 mos or has a future within next 30 days. See \"Patient Info\" tab in inbasket, or \"Choose Columns\" in Meds & Orders section of the refill encounter.              Passed - Patient does not have Tadalafil, Vardenafil, or Sildenafil on their medication list        " "Passed - Medication is active on med list        Passed - Patient is 18 years of age or older           metoprolol succinate ER (TOPROL XL) 200 MG 24 hr tablet 90 tablet 3     Sig: Take 1 tablet (200 mg) by mouth daily       Beta-Blockers Protocol Failed - 10/19/2022  4:34 PM        Failed - Blood pressure under 140/90 in past 12 months     BP Readings from Last 3 Encounters:   07/14/22 (!) 166/77   07/23/21 (!) 182/99   06/16/21 (!) 189/96                 Passed - Patient is age 6 or older        Passed - Recent (12 mo) or future (30 days) visit within the authorizing provider's specialty     Patient has had an office visit with the authorizing provider or a provider within the authorizing providers department within the previous 12 mos or has a future within next 30 days. See \"Patient Info\" tab in inbasket, or \"Choose Columns\" in Meds & Orders section of the refill encounter.              Passed - Medication is active on med list             Lata Muñoz RN 10/20/22 2:44 PM  "

## 2022-10-20 NOTE — TELEPHONE ENCOUNTER
"Routing refill request to provider for review/approval because:  A break in medication  BP out of range  metoprolol succinate ER (TOPROL-XL) 200 MG 24 hr tablet  Last Written Prescription Date:  7/27/21  Last Fill Quantity: 90,  # refills: 3   tamsulosin (FLOMAX) 0.4 MG capsule  Last Written Prescription Date:  10/5/21  Last Fill Quantity: 90,  # refills: 3   Last office visit provider:  7/14/22     Requested Prescriptions   Pending Prescriptions Disp Refills     tamsulosin (FLOMAX) 0.4 MG capsule 90 capsule 3     Sig: [TAMSULOSIN (FLOMAX) 0.4 MG CAP] TAKE 1 CAPSULE(0.4 MG) BY MOUTH DAILY AFTER BREAKFAST       Alpha Blockers Failed - 10/19/2022  4:34 PM        Failed - Blood pressure under 140/90 in past 12 months     BP Readings from Last 3 Encounters:   07/14/22 (!) 166/77 07/23/21 (!) 182/99 06/16/21 (!) 189/96                 Passed - Recent (12 mo) or future (30 days) visit within the authorizing provider's specialty     Patient has had an office visit with the authorizing provider or a provider within the authorizing providers department within the previous 12 mos or has a future within next 30 days. See \"Patient Info\" tab in inbasket, or \"Choose Columns\" in Meds & Orders section of the refill encounter.              Passed - Patient does not have Tadalafil, Vardenafil, or Sildenafil on their medication list        Passed - Medication is active on med list        Passed - Patient is 18 years of age or older           metoprolol succinate ER (TOPROL XL) 200 MG 24 hr tablet 90 tablet 3     Sig: Take 1 tablet (200 mg) by mouth daily       Beta-Blockers Protocol Failed - 10/19/2022  4:34 PM        Failed - Blood pressure under 140/90 in past 12 months     BP Readings from Last 3 Encounters:   07/14/22 (!) 166/77 07/23/21 (!) 182/99 06/16/21 (!) 189/96                 Passed - Patient is age 6 or older        Passed - Recent (12 mo) or future (30 days) visit within the authorizing provider's specialty     " "Patient has had an office visit with the authorizing provider or a provider within the authorizing providers department within the previous 12 mos or has a future within next 30 days. See \"Patient Info\" tab in inbasket, or \"Choose Columns\" in Meds & Orders section of the refill encounter.              Passed - Medication is active on med list         Signed Prescriptions Disp Refills    potassium chloride ER (K-TAB/KLOR-CON) 10 MEQ CR tablet 180 tablet 2     Sig: Take 1 tablet (10 mEq) by mouth 2 times daily       Potassium Supplements Protocol Passed - 10/19/2022  4:34 PM        Passed - Recent (12 mo) or future (30 days) visit within the authorizing provider's department     Patient has had an office visit with the authorizing provider or a provider within the authorizing providers department within the previous 12 mos or has a future within next 30 days. See \"Patient Info\" tab in inbasket, or \"Choose Columns\" in Meds & Orders section of the refill encounter.              Passed - Medication is active on med list        Passed - Patient is age 18 or older        Passed - Normal serum potassium in past 12 months     Recent Labs   Lab Test 07/14/22  1217   POTASSIUM 4.4                         Lata Muñoz RN 10/20/22 2:48 PM  "

## 2022-10-23 ENCOUNTER — HEALTH MAINTENANCE LETTER (OUTPATIENT)
Age: 62
End: 2022-10-23

## 2023-01-07 DIAGNOSIS — R60.9 EDEMA, UNSPECIFIED TYPE: ICD-10-CM

## 2023-01-07 DIAGNOSIS — I10 ESSENTIAL HYPERTENSION: ICD-10-CM

## 2023-01-07 DIAGNOSIS — E87.6 HYPOKALEMIA: ICD-10-CM

## 2023-01-10 RX ORDER — FUROSEMIDE 20 MG
20 TABLET ORAL DAILY
Qty: 90 TABLET | Refills: 2 | Status: SHIPPED | OUTPATIENT
Start: 2023-01-10 | End: 2023-07-05

## 2023-01-10 RX ORDER — POTASSIUM CHLORIDE 750 MG/1
TABLET, EXTENDED RELEASE ORAL
Qty: 180 TABLET | Refills: 2 | OUTPATIENT
Start: 2023-01-10

## 2023-01-10 NOTE — TELEPHONE ENCOUNTER
Left message #1 at [phone]. Postponing task out to a week and will try again. If patient returns call back, please help patient schedule an appointment per message below. Thanks!

## 2023-01-10 NOTE — TELEPHONE ENCOUNTER
"Routing refill request to provider for review/approval because:  Labs out of range:  Creatinine  BP not in range.    Last Written Prescription Date:  7/20/22  Last Fill Quantity: 90,  # refills: 0   Last office visit provider:  7/14/22     Requested Prescriptions   Pending Prescriptions Disp Refills     furosemide (LASIX) 20 MG tablet [Pharmacy Med Name: FUROSEMIDE 20MG TABLETS] 90 tablet 0     Sig: TAKE 1 TABLET BY MOUTH EVERY DAY       Diuretics (Including Combos) Protocol Failed - 1/10/2023  7:26 AM        Failed - Blood pressure under 140/90 in past 12 months     BP Readings from Last 3 Encounters:   07/14/22 (!) 166/77   07/23/21 (!) 182/99   06/16/21 (!) 189/96                 Failed - Normal serum creatinine on file in past 12 months     Recent Labs   Lab Test 07/14/22  1217   CR 1.18*              Passed - Recent (12 mo) or future (30 days) visit within the authorizing provider's specialty     Patient has had an office visit with the authorizing provider or a provider within the authorizing providers department within the previous 12 mos or has a future within next 30 days. See \"Patient Info\" tab in inbasket, or \"Choose Columns\" in Meds & Orders section of the refill encounter.              Passed - Medication is active on med list        Passed - Patient is age 18 or older        Passed - Normal serum potassium on file in past 12 months     Recent Labs   Lab Test 07/14/22  1217   POTASSIUM 4.4                    Passed - Normal serum sodium on file in past 12 months     Recent Labs   Lab Test 07/14/22  1217                  Refused Prescriptions Disp Refills     potassium chloride ER (K-TAB/KLOR-CON) 10 MEQ CR tablet [Pharmacy Med Name: POTASSIUM CL 10MEQ ER TABLETS] 180 tablet 2     Sig: TAKE 1 TABLET(10 MEQ) BY MOUTH TWICE DAILY       Potassium Supplements Protocol Passed - 1/10/2023  7:26 AM        Passed - Recent (12 mo) or future (30 days) visit within the authorizing provider's department     " "Patient has had an office visit with the authorizing provider or a provider within the authorizing providers department within the previous 12 mos or has a future within next 30 days. See \"Patient Info\" tab in inbasket, or \"Choose Columns\" in Meds & Orders section of the refill encounter.              Passed - Medication is active on med list        Passed - Patient is age 18 or older        Passed - Normal serum potassium in past 12 months     Recent Labs   Lab Test 07/14/22  1217   POTASSIUM 4.4                         Torito Park RN 01/10/23 7:27 AM  "

## 2023-01-18 NOTE — TELEPHONE ENCOUNTER
Left message #2 at [phone]. Sending letter out and postponing task out to 2 weeks and will try again if an appointment hasn't been made. If patient returns call back, please help patient schedule an appointment per message below. Thanks!

## 2023-02-01 NOTE — TELEPHONE ENCOUNTER
Left message #3 at [phone]. If patient returns call back, please help patient schedule an appointment per message below. Thanks! We have made several attempts to contact patient by phone and letter to schedule an appointment. Unfortunately, our calls have not been returned and we were unable to schedule. At this time, we will no longer make an attempt to schedule this appointment. Completing task.

## 2023-04-02 ENCOUNTER — HEALTH MAINTENANCE LETTER (OUTPATIENT)
Age: 63
End: 2023-04-02

## 2023-07-05 DIAGNOSIS — I10 ESSENTIAL HYPERTENSION: ICD-10-CM

## 2023-07-05 DIAGNOSIS — G80.9 INFANTILE CEREBRAL PALSY (H): Primary | Chronic | ICD-10-CM

## 2023-07-05 DIAGNOSIS — E87.6 HYPOKALEMIA: ICD-10-CM

## 2023-07-05 DIAGNOSIS — R60.9 EDEMA, UNSPECIFIED TYPE: ICD-10-CM

## 2023-07-05 RX ORDER — POTASSIUM CHLORIDE 750 MG/1
10 TABLET, EXTENDED RELEASE ORAL 2 TIMES DAILY
Qty: 180 TABLET | Refills: 2 | Status: SHIPPED | OUTPATIENT
Start: 2023-07-05 | End: 2024-02-07

## 2023-07-05 RX ORDER — FUROSEMIDE 20 MG
20 TABLET ORAL DAILY
Qty: 90 TABLET | Refills: 2 | Status: SHIPPED | OUTPATIENT
Start: 2023-07-05 | End: 2024-02-07

## 2023-07-05 NOTE — PROGRESS NOTES
DME (Durable Medical Equipment) Orders and Documentation  Orders Placed This Encounter   Procedures     Crutches Order      The patient was assessed and it was determined the patient is in need of the following listed DME Supplies/Equipment. Please complete supporting documentation below to demonstrate medical necessity.      DME All Other Item(s) Documentation    List reason for need and supporting documentation for medical necessity below for each DME item.     1.cerebral palsy

## 2023-07-25 ENCOUNTER — TRANSFERRED RECORDS (OUTPATIENT)
Dept: HEALTH INFORMATION MANAGEMENT | Facility: CLINIC | Age: 63
End: 2023-07-25
Payer: MEDICARE

## 2023-07-27 ENCOUNTER — MEDICAL CORRESPONDENCE (OUTPATIENT)
Dept: HEALTH INFORMATION MANAGEMENT | Facility: CLINIC | Age: 63
End: 2023-07-27
Payer: MEDICARE

## 2023-08-01 ENCOUNTER — TELEPHONE (OUTPATIENT)
Dept: FAMILY MEDICINE | Facility: CLINIC | Age: 63
End: 2023-08-01

## 2023-08-01 NOTE — TELEPHONE ENCOUNTER
I spoke with nurse at [facility], she stated the pt's eye doctor would like to start him on Diamox 250mg twice daily and an eye drop called Timolol twice daily. They aren't too much worried about they eye drops but the Diamox can sometimes affect other medications so they wanted to get clearance from you.

## 2023-08-01 NOTE — TELEPHONE ENCOUNTER
Please let them know that there should be no problem with interactions with other medications.  Okay to prescribe Diamox

## 2023-08-01 NOTE — TELEPHONE ENCOUNTER
Called number for [facility] that was listed and LVM for them to call back with more information.

## 2023-08-01 NOTE — TELEPHONE ENCOUNTER
To clarify- I am to call [facility]?  Or pt?  If it is pt, I think I could do this through a message- can be get the name of the drop being suggested?  If [facility] just lmk- though that is a little strange

## 2023-08-01 NOTE — TELEPHONE ENCOUNTER
General Call      Reason for Call:  RX question    What are your questions or concerns:  The doctor pt is seeing for his eye, [facility] would like to prescribe some RX and eye drop but she would like clearance from pt PCP first. Please call back to [phone], thanks!    Date of last appointment with provider: 7/14/2023    Could we send this information to you in [place] or would you prefer to receive a phone call?:   Patient would prefer a phone call   Okay to leave a detailed message?: Yes at Other phone number:  [phone]

## 2023-08-02 ENCOUNTER — TRANSCRIBE ORDERS (OUTPATIENT)
Dept: OTHER | Age: 63
End: 2023-08-02

## 2023-08-02 DIAGNOSIS — H25.10 NUCLEAR SCLEROSIS: ICD-10-CM

## 2023-08-02 DIAGNOSIS — H40.50X0: Primary | ICD-10-CM

## 2023-08-02 DIAGNOSIS — Q13.81: ICD-10-CM

## 2023-08-02 NOTE — TELEPHONE ENCOUNTER
Called and spoke to Rosanna at [facility] and let her know it is okay to prescribe diomax. No further action needed

## 2023-08-08 NOTE — TELEPHONE ENCOUNTER
Denisa Daily   is seen today for a follow-up visit.  
br
br68 year old female with small crohn's disease since her 20's, remote smoking (none since 1977) status post ileocacal resection and SB surgery with about 68 cm of SB removed. she also has [id number] was seen by Dr. Gutierrez in 1/2017 per that visitbr"She reports that she was diagnosed with Crohn's ileocolitis at age 19 and in reviewing some of the records that she brings with her today, she has had multiple surgeries with ~68cm of resected small bowel. She reports that her last surgery was a little over 10yrs ago.Lanette most recent colonoscope was done in 6/2015 (while living in [place]) and was remarkable for an ileo-colonic anastomosis in the ascending colon which was patent - however, the anastomosis and her caleb-TI contained a few patchy non-bleeding erosions.brPer her report, she has only been on 5-ASA medications - previously Pentasa but currently Sulfasalazine. On this regimen, she has been having 1-3 formed BMs/day without any GI tract bleeding symptoms or abdominal pain/cramping. She reports that her weight has been stable and her appetite has been good. She also reports that she has been diagnosed with osteoporosis and has been on Prolia but could not tolerate it - she is going to have a follow-up DEXA scan through 's office in April."She did not follow up on Dr. Gutierrez's recommendation for work up and treatment.She has been seeing Dr. Meneses in [facility]. Her last surgery was 1994. She flared around 2012. She has tried to stay off biologics. She had another flare in June and was on prednisone since June to Aug. When she flares she gets bad abdominal pain and obstructive symptoms. Her last colonoscopy was at least 4-5 years ago.She is currently on pentasa 6 pills a day. She is also on medicine for GERD (prevacid and pepcid) and has had a fundoplicationCurrently she has a BM 2-3 BM in the AM soft to water. No blood. Only mild pain after eating.Interval History:11/18/2021She has been flaring, having some RLQ pain and diarrhea. Sometimes she gets it into the back and into the perineum. She is watching her diet.brShe hasn't seen any blood in th estool.Riana is taking pentasa 6 pills a day. when she tried to cut down dose she felt like she got worse.brInterval History:3/3/2022bAndrae received initial dose of entyvio 12/23brShe has had 3 doses. she is now on the 8 week maintenance.Riana is starting to feel a little better.Riana is getting it done at [address] [place]  which is working well for us she doesn't pay anything.Riana is off budesonide.Riana is still taking pentasa 6 pills ad ay.Riana is still having some right pelvic pain that she feels like is improving.Riana is still having loose BM, diarrhea is not as often. It was initially 4-5 times a day and now twice a daybrInterval History:5/23/2022brvit D and b12 were normalbrFC was 130brI asked her to remain on pentasa and start xifaxanbrHer last infusion was about 8 weeks ago. She felt like that helped her back and perineal pain is better.Riana has had 4 URI infections on entyvio but she has also had lots of exposures bc [facility] and kids are in .Riana was seen in urgent care and all covid, strep, flu test were negative.Riana is also having some diarrhea and pain.Riana did not notice any difference with the xifaxan. she is taking pentasa 6 pills a daybrIn the last couple days she has had up to 10 BM a day. No blood in the stool.brInterval History:7/28/2022br5/22 C diff negbr5/22 CTE with active disease in the neoTIbr7/22 colonoscopy with ileocolonic anastomosis and multiple erosions/ulcers on the ileal side and at anastomoses. THe colonic mucosa was normalbrLast entyvio infusion was 5/22brHer pain has been a little better. She is still having diarrhea. 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 She goes once a year for checks and have things removed from time to time.Riana is taking the pentasa 6 pills a day.brInterval History:11/28/2022brIn September she had the infusions and she felt better right away.Riana finished the entocort. She is still on 6 pills a day of pentasa.br11/9 she had her first injection. She was a week late and was starting to have a little diarrhea.Riana felt that it did help.brStarting less then a week ago she had some aching in her rectum. She has had a little diarrhea as well.Riana tried some Aquaphor a little.Riana is not noticing URI symptoms on stelara (was having a lot of that on entyvio)She is currently taking a multivitamin. zinc, vitamin D, vitamin C and calciumSheffie is on lansoprazole 30Interval History:4/3/2023brMRE and CT with disease activity in the colon and small bowelbrI started budesonide mid januarybrcolonoscopy with disease at the neoTI anastomosis but non once I entered caleb TI. there was a possible stricture.brFC was 230.Riana had some dysphagia and tightness at GE junction was dilated to 18.Riana went down to 4 pills a day of pentasa.Riana is on 2 budesonidebrShe decreased coffee and decreased winebrShe had the IV and 2 injections of the stelaraShe went to the ER 12 days after taking the 2nd stelara injection. Because the imaging saw lots of inflammation, she therefor stopped the stellara.In terms of the gastritis/GERD she is on lansoprazole 30 and famotidine 40. She has tried nexium, protonix, prilosec and they didn't help. Her insurance wouldn't pay for dexilant.She has cataracts in both eyes and would like to get them fixed.Interval History:6/19/2023bAndrae was doing great on skirizi, feeling really good then starting having fatigue, fevers, some mild headache. 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 She really didn't have diarrhea till they started her on antibiotics. brThe diarrhea got better when they stopped the antibiotics. NOw she is having diarrhea but it is not as bad and controlled with 2 Imodium a day. Riana is due to follow up with Dr. Lou in 3 weeks. She will remain on ganciclovir during that time.Riana is slowly getting her energy back. She is having a little discomfort with taking a deep breath but that is improving with exercise and flonase.    br  Plan from last visit:
br- labs nowbr- we will plan to start back on skirizi once all symptoms have resolved and viremia has resolved. LIkely will need reinduction.br- we will decide on plan once I get labs from Dr. Lou. br- follow up in 1-2 months Interval History:  8/8/2023   
br   I personally spoke to Dr. Lou and she is concerned about skirizi treatment but unclear if anti-TNF would be safer and other meds have not been effective.  will plan for prophylaxis. 
br She is feeling a little tired
br She has a squamous cell lesion on the left vulva that is being removed next month.  She is seeing Dr. Bruno gonzales.  
brBowel movements are a little loose, sometimes liquid. She felt like her crohns pain was coming back and then she got skirizi and things are feeling better. She is taking immodium br Her acid reflux has been bothering her. She has epigastric pain and belching.  She feels like the fundoplication is not working as before.  
br She is taking lasoprazole 30 mg twice a day, 
br She is going to do a virtual visit on 8/29 and then Dr. Rushing on 8/30 at [facility]. 
br She is on valgancyclovir 2 pill a day and seems to be doing OK with that. My nurse has reviewed and updated the medication list with the patient (medication reconciliation). I have also reviewed the medication list. New updates were made to the patient's medical, social and family history. Pertinent details are also noted above in the HPI.br visited="true" Reason for Call:  Other     Detailed comments: pt just wanted to let  know that due to transportation issues he will not beable to go to any appts. (I did tell him there are virtual visits available) he declined and just wanted  To know he has no rides. Phone Number Patient can be reached at: Home number on file [phone] (home)    Best Time: no call back needed     Can we leave a detailed message on this number? No call back needed     Call taken on 8/30/2021 at 4:04 PM by Carmen Vance

## 2023-08-20 ENCOUNTER — MYC MEDICAL ADVICE (OUTPATIENT)
Dept: FAMILY MEDICINE | Facility: CLINIC | Age: 63
End: 2023-08-20
Payer: MEDICARE

## 2023-08-21 NOTE — TELEPHONE ENCOUNTER
RN attempted to contact patient, but no answer. Left message on patient's voice mail to call clinic back.    Patient was last seen on 7/14/22 by Dr Soria and referred to Urology for urinary frequency.  Patient should be seen yearly for medication refills    Sunita Polk RN  [facility]    [facility] (supporting Ladarius Soria MD)Yesterday (9:43 AM)     I've been having issues with urination lately, being unable to control my bladder and not getting to the bathroom in time.  During a recent visit, you'd recommended a condom catheter and I'd like to get one to avoid further embarrassment.

## 2023-08-22 NOTE — TELEPHONE ENCOUNTER
Contacted patient and let him know DME for condom catheter was faxed to [facility].  Gave patient phone number  No further action needed    Snuita Polk RN  [facility]

## 2023-08-22 NOTE — TELEPHONE ENCOUNTER
S-(situation):     I've been having issues with urination lately, being unable to control my bladder and not getting to the bathroom in time.  During a recent visit, you'd recommended a condom catheter and I'd like to get one to avoid further embarrassment.    B-(background):     Patient was last seen on 7/14/22 by Dr Soria and Urology referral done.    A-(assessment):   Patient declined triage  Patient has Cerebral Palsy and has ongoing bladder control.  Patient also taking Lasix and Flomax    R-(recommendations):   Contacted [employer] at [phone] and they carry condom catheters.  Message routed to Dr Soria for review / plan.  Contacted patient and scheduled an appointment with PCP, Dr Soria on 8/30/23.    Message sent t Dr Soria for possible DME order for condom catheter.    Sunita Polk RN  [facility]

## 2023-08-29 ENCOUNTER — TELEPHONE (OUTPATIENT)
Dept: FAMILY MEDICINE | Facility: CLINIC | Age: 63
End: 2023-08-29
Payer: MEDICARE

## 2023-08-29 NOTE — TELEPHONE ENCOUNTER
"Called and left message #1. Upon return call please ask patient what size condom he uses for his condom catheter. Size is needed for [employer]  \" Okay to relay message\"    "

## 2023-09-05 NOTE — TELEPHONE ENCOUNTER
Left message x 3. Please review message thread below and advise the patient as indicated. Please schedule if necessary or indicated in message thread.

## 2023-10-10 ENCOUNTER — OFFICE VISIT (OUTPATIENT)
Dept: FAMILY MEDICINE | Facility: CLINIC | Age: 63
End: 2023-10-10
Payer: MEDICARE

## 2023-10-10 VITALS
RESPIRATION RATE: 24 BRPM | SYSTOLIC BLOOD PRESSURE: 150 MMHG | WEIGHT: 170 LBS | DIASTOLIC BLOOD PRESSURE: 75 MMHG | OXYGEN SATURATION: 100 % | TEMPERATURE: 97.5 F | BODY MASS INDEX: 28.32 KG/M2 | HEART RATE: 52 BPM | HEIGHT: 65 IN

## 2023-10-10 DIAGNOSIS — G80.9 INFANTILE CEREBRAL PALSY (H): Chronic | ICD-10-CM

## 2023-10-10 DIAGNOSIS — I13.0 HYPERTENSIVE HEART AND CHRONIC KIDNEY DISEASE WITH HEART FAILURE AND STAGE 1 THROUGH STAGE 4 CHRONIC KIDNEY DISEASE, OR UNSPECIFIED CHRONIC KIDNEY DISEASE (H): ICD-10-CM

## 2023-10-10 DIAGNOSIS — Z12.11 SCREEN FOR COLON CANCER: ICD-10-CM

## 2023-10-10 DIAGNOSIS — N18.31 STAGE 3A CHRONIC KIDNEY DISEASE (H): ICD-10-CM

## 2023-10-10 DIAGNOSIS — R60.9 EDEMA, UNSPECIFIED TYPE: ICD-10-CM

## 2023-10-10 DIAGNOSIS — I50.30 HEART FAILURE WITH PRESERVED EJECTION FRACTION, UNSPECIFIED HF CHRONICITY (H): ICD-10-CM

## 2023-10-10 DIAGNOSIS — R35.0 URINARY FREQUENCY: Primary | ICD-10-CM

## 2023-10-10 DIAGNOSIS — I10 ESSENTIAL HYPERTENSION: Chronic | ICD-10-CM

## 2023-10-10 DIAGNOSIS — R73.9 HYPERGLYCEMIA: ICD-10-CM

## 2023-10-10 DIAGNOSIS — Z59.82 TRANSPORTATION INSECURITY: ICD-10-CM

## 2023-10-10 DIAGNOSIS — R80.9 PROTEINURIA, UNSPECIFIED TYPE: ICD-10-CM

## 2023-10-10 DIAGNOSIS — E78.00 HYPERCHOLESTEROLEMIA: ICD-10-CM

## 2023-10-10 LAB
ALBUMIN SERPL BCG-MCNC: 3.9 G/DL (ref 3.5–5.2)
ALP SERPL-CCNC: 68 U/L (ref 40–129)
ALT SERPL W P-5'-P-CCNC: 12 U/L (ref 0–70)
ANION GAP SERPL CALCULATED.3IONS-SCNC: 8 MMOL/L (ref 7–15)
AST SERPL W P-5'-P-CCNC: 16 U/L (ref 0–45)
BILIRUB SERPL-MCNC: 0.8 MG/DL
BUN SERPL-MCNC: 23.4 MG/DL (ref 8–23)
CALCIUM SERPL-MCNC: 9.4 MG/DL (ref 8.8–10.2)
CHLORIDE SERPL-SCNC: 105 MMOL/L (ref 98–107)
CHOLEST SERPL-MCNC: 172 MG/DL
CREAT SERPL-MCNC: 1.35 MG/DL (ref 0.67–1.17)
DEPRECATED HCO3 PLAS-SCNC: 28 MMOL/L (ref 22–29)
EGFRCR SERPLBLD CKD-EPI 2021: 58 ML/MIN/1.73M2
ERYTHROCYTE [DISTWIDTH] IN BLOOD BY AUTOMATED COUNT: 13.4 % (ref 10–15)
GLUCOSE SERPL-MCNC: 82 MG/DL (ref 70–99)
HBA1C MFR BLD: 5.6 % (ref 0–5.6)
HCT VFR BLD AUTO: 50 % (ref 40–53)
HDLC SERPL-MCNC: 33 MG/DL
HGB BLD-MCNC: 16.3 G/DL (ref 13.3–17.7)
LDLC SERPL CALC-MCNC: 98 MG/DL
MCH RBC QN AUTO: 28.6 PG (ref 26.5–33)
MCHC RBC AUTO-ENTMCNC: 32.6 G/DL (ref 31.5–36.5)
MCV RBC AUTO: 88 FL (ref 78–100)
NONHDLC SERPL-MCNC: 139 MG/DL
NT-PROBNP SERPL-MCNC: 838 PG/ML (ref 0–900)
PLATELET # BLD AUTO: 186 10E3/UL (ref 150–450)
POTASSIUM SERPL-SCNC: 4.2 MMOL/L (ref 3.4–5.3)
PROT SERPL-MCNC: 7.3 G/DL (ref 6.4–8.3)
RBC # BLD AUTO: 5.69 10E6/UL (ref 4.4–5.9)
SODIUM SERPL-SCNC: 141 MMOL/L (ref 135–145)
TRIGL SERPL-MCNC: 206 MG/DL
TSH SERPL DL<=0.005 MIU/L-ACNC: 2.39 UIU/ML (ref 0.3–4.2)
VIT D+METAB SERPL-MCNC: 20 NG/ML (ref 20–50)
WBC # BLD AUTO: 6.1 10E3/UL (ref 4–11)

## 2023-10-10 PROCEDURE — 80061 LIPID PANEL: CPT | Performed by: PHYSICIAN ASSISTANT

## 2023-10-10 PROCEDURE — 83880 ASSAY OF NATRIURETIC PEPTIDE: CPT | Performed by: PHYSICIAN ASSISTANT

## 2023-10-10 PROCEDURE — 80053 COMPREHEN METABOLIC PANEL: CPT | Performed by: PHYSICIAN ASSISTANT

## 2023-10-10 PROCEDURE — 85027 COMPLETE CBC AUTOMATED: CPT | Performed by: PHYSICIAN ASSISTANT

## 2023-10-10 PROCEDURE — 36415 COLL VENOUS BLD VENIPUNCTURE: CPT | Performed by: PHYSICIAN ASSISTANT

## 2023-10-10 PROCEDURE — 99214 OFFICE O/P EST MOD 30 MIN: CPT | Performed by: PHYSICIAN ASSISTANT

## 2023-10-10 PROCEDURE — 84443 ASSAY THYROID STIM HORMONE: CPT | Performed by: PHYSICIAN ASSISTANT

## 2023-10-10 PROCEDURE — 83036 HEMOGLOBIN GLYCOSYLATED A1C: CPT | Performed by: PHYSICIAN ASSISTANT

## 2023-10-10 PROCEDURE — 82306 VITAMIN D 25 HYDROXY: CPT | Performed by: PHYSICIAN ASSISTANT

## 2023-10-10 SDOH — ECONOMIC STABILITY - TRANSPORTATION SECURITY: TRANSPORTATION INSECURITY: Z59.82

## 2023-10-10 NOTE — PROGRESS NOTES
"  Subjective:    Bright Lockett is a 63 year old male who presents with chief complaint of \"I want a catheter.\"    He has a complex medical history including cerebral palsy, CHF, CKD 3, hypertension.  His last visit at our clinic was over a year ago, with his PCP Dr. Soria.  Patient says his urine frequency is getting worse, and he would like a catheter.  PCP had ordered a catheter through [facility] a few months ago, but somehow the order not get completed.  Patient wants to get a catheter because of his frequent urination.  He had a referral to urology a while ago, but missed his appointment because of transportation issues.  He only has vision in 1 eye, and that eye has a cataract.  He has arm braces that he uses for ambulation with his CP.  He does not have any reliable transportation.  He is here today with his neighbor who drove him here.  She is in the medical field.  She was very helpful.  She was concerned that he get help with transportation and possibly a wheelchair, due to his increasingly limited mobility.  She says she has noticed that he is more short of breath with exertion.  She does not think that he would have the dexterity to change the catheter or insert and remove the catheter easily.    He brought in home blood pressure readings.  Most of them are in the 130s over 70s.  A few readings around 165/85-90.    He states that he is in charge of his medications.  If he cannot read the bottles, his roommate helps him set things up.  He is in the process of following with ophthalmology for his cataract in his only working eye.      Patient Active Problem List   Diagnosis    Unspecified glaucoma    Neuritis    Essential hypertension    Osteoarthritis Of The Knee    Cerebral Palsy    Left Ventricular Hypertrophy    Esophageal reflux    Aortic Sclerosis    Benign Adenomatous Polyp Of The Large Intestine    Diverticulosis    Hypercholesterolemia    Acute bilateral low back pain without sciatica    " Proteinuria    Headache    Cough    Urinary frequency    Healthcare maintenance    Benign prostatic hyperplasia with lower urinary tract symptoms, symptom details unspecified    Daytime sleepiness    Cervical lymphadenopathy    Hyperglycemia    Stage 3a chronic kidney disease (H)    Edema, unspecified type    Heart failure with preserved ejection fraction (H)    Left ventricular hypertrophy    Personal history of colonic polyps    Hypertensive heart and chronic kidney disease with heart failure and stage 1 through stage 4 chronic kidney disease, or unspecified chronic kidney disease (H)       Current Outpatient Medications:     ascorbic acid, vitamin C, (VITAMIN C) 1000 MG tablet, [ASCORBIC ACID, VITAMIN C, (VITAMIN C) 1000 MG TABLET] Take 1,000 mg by mouth daily., Disp: , Rfl:     atorvastatin (LIPITOR) 20 MG tablet, [ATORVASTATIN (LIPITOR) 20 MG TABLET] Take 1 tablet (20 mg total) by mouth at bedtime., Disp: 90 tablet, Rfl: 3    calcium-magnesium-zinc Tab, [CALCIUM-MAGNESIUM-ZINC TAB] 1 tablet daily., Disp: , Rfl:     cyclobenzaprine (FLEXERIL) 5 MG tablet, [CYCLOBENZAPRINE (FLEXERIL) 5 MG TABLET] Take 1 tablet (5 mg total) by mouth 3 (three) times a day as needed for muscle spasms., Disp: 30 tablet, Rfl: 3    finasteride (PROSCAR) 5 MG tablet, [FINASTERIDE (PROSCAR) 5 MG TABLET] TAKE 1 TABLET(5 MG) BY MOUTH DAILY, Disp: 90 tablet, Rfl: 3    fluticasone (ALLERGY RELIEF, FLUTICASONE,) 50 mcg/actuation nasal spray, [FLUTICASONE (ALLERGY RELIEF, FLUTICASONE,) 50 MCG/ACTUATION NASAL SPRAY] 2 sprays each nostril once daily, Disp: 16 g, Rfl: 11    furosemide (LASIX) 20 MG tablet, Take 1 tablet (20 mg) by mouth daily, Disp: 90 tablet, Rfl: 2    HYDROcodone-acetaminophen 5-325 mg per tablet, [HYDROCODONE-ACETAMINOPHEN 5-325 MG PER TABLET] Take 1 tablet by mouth every 6 (six) hours as needed for pain., Disp: 20 tablet, Rfl: 0    loratadine (CLARITIN) 10 mg tablet, [LORATADINE (CLARITIN) 10 MG TABLET] Take 1 tablet (10 mg  "total) by mouth daily., Disp: 30 tablet, Rfl: 11    losartan (COZAAR) 100 MG tablet, Take 1 tablet (100 mg) by mouth daily, Disp: 90 tablet, Rfl: 3    losartan (COZAAR) 100 MG tablet, Take 1 tablet (100 mg) by mouth daily, Disp: 90 tablet, Rfl: 3    metoprolol succinate ER (TOPROL XL) 200 MG 24 hr tablet, Take 1 tablet (200 mg) by mouth daily, Disp: 90 tablet, Rfl: 3    omeprazole (PRILOSEC) 10 MG DR capsule, Take 10 mg by mouth, Disp: , Rfl:     omeprazole (PRILOSEC) 20 MG capsule, [OMEPRAZOLE (PRILOSEC) 20 MG CAPSULE] Take 1 capsule (20 mg total) by mouth daily., Disp: 90 capsule, Rfl: 2    potassium chloride ER (K-TAB/KLOR-CON) 10 MEQ CR tablet, Take 1 tablet (10 mEq) by mouth 2 times daily, Disp: 180 tablet, Rfl: 2    tamsulosin (FLOMAX) 0.4 MG capsule, [TAMSULOSIN (FLOMAX) 0.4 MG CAP] TAKE 1 CAPSULE(0.4 MG) BY MOUTH DAILY AFTER BREAKFAST, Disp: 90 capsule, Rfl: 3    timolol maleate (TIMOPTIC) 0.5 % ophthalmic solution, [TIMOLOL MALEATE (TIMOPTIC) 0.5 % OPHTHALMIC SOLUTION] Administer 1 drop to both eyes daily., Disp: , Rfl:       Objective:   Allergies:  Morphine    Vitals:  Vitals:    10/10/23 1026 10/10/23 1031   BP: (!) 148/80 (!) 150/75   BP Location: Left arm    Patient Position: Sitting    Cuff Size: Adult Regular    Pulse: 52    Resp: 24    Temp: 97.5  F (36.4  C)    TempSrc: Temporal    SpO2: 100%    Weight: 77.1 kg (170 lb)    Height: 1.645 m (5' 4.75\")        Body mass index is 28.51 kg/m .    Vital signs reviewed.  General: Patient is alert and oriented x 3, in no apparent distress, interactive during visit, hard of hearing  Cardiac: regular rate and rhythm, no murmurs  Pulmonary: lungs clear to auscultation bilaterally, no crackles, rales, rhonchi, or wheezing noted  Lower extremities: Lower extremity edema bilaterally    Results for orders placed or performed in visit on 10/10/23   CBC with platelets     Status: Normal   Result Value Ref Range    WBC Count 6.1 4.0 - 11.0 10e3/uL    RBC Count 5.69 " 4.40 - 5.90 10e6/uL    Hemoglobin 16.3 13.3 - 17.7 g/dL    Hematocrit 50.0 40.0 - 53.0 %    MCV 88 78 - 100 fL    MCH 28.6 26.5 - 33.0 pg    MCHC 32.6 31.5 - 36.5 g/dL    RDW 13.4 10.0 - 15.0 %    Platelet Count 186 150 - 450 10e3/uL     Other labs pending.        Assessment and Plan:   1. Urinary frequency  Chronic, uncontrolled.  Has a few medicines to help with this, but unclear if he is taking them.  He is interested in getting a catheter because he urinates so frequently.  I do not think he would be able to change catheter at home by himself, does not have the dexterity to do that.  Referral to urology for follow-up.  - Adult Urology  Referral; Future    2. Essential hypertension  Chronic, uncontrolled.  Blood pressure was elevated today in clinic.  He did bring some readings from home where most blood pressures was in were in the 130s over 70s to 80s.  Some blood pressures in the 160s systolic.  I am concerned that he is not taking all of his medications.  He cannot see very well.  He states that he takes care of his medications himself.  His roommate helps him with medicines.  He cannot give me any information on what medicines he takes.  I sent him home with a medication list to hopefully check and make sure he has all of his medications.  Declines Home Health Nurse referral today.  Plan follow-up with PCP in a few weeks.  - CBC with platelets  - Comprehensive metabolic panel  - BNP-N terminal pro; Future  - Hemoglobin A1c  - BNP-N terminal pro    3. Stage 3a chronic kidney disease (H)  4. Hypertensive heart and chronic kidney disease with heart failure and stage 1 through stage 4 chronic kidney disease, or unspecified chronic kidney disease (H)  Chronic, stable.  Screening labs ordered and I will follow-up with results.  - Comprehensive metabolic panel  - Albumin Random Urine Quantitative with Creat Ratio  - Hemoglobin A1c  - Vitamin D Deficiency    5. Heart failure with preserved ejection  fraction, unspecified HF chronicity (H)  Chronic.  Unclear if this is well controlled or not.  Neighbor reports increasing shortness of breath.  Hard to tell if this is from worsening heart failure, deconditioning, versus other.  Screening labs ordered and I will follow-up with results.  Also some concerns for med noncompliance, see above.  - TSH with free T4 reflex  - CBC with platelets  - BNP-N terminal pro; Future  - BNP-N terminal pro    6. Edema, unspecified type  Chronic, unchanged.  Chronic edema in bilateral lower extremities.  Difficult to assess today because he had 2 pairs of very tight socks on it were already providing some compression.  BN P ordered, along with other screening labs.  - BNP-N terminal pro; Future  - BNP-N terminal pro    7. Proteinuria, unspecified type  Chronic, stable.  Screening labs ordered and I will follow-up with results.  - Adult Urology  Referral; Future  - Lipid Profile    8. Cerebral Palsy  Chronic, worsening.  He has arm braces to help with ambulation.  Neighbor who is with him today notes that getting around has been more difficult for him, he is getting more short of breath.  She was wondering if a wheelchair would be appropriate.  He can follow-up on this with PCP at upcoming visit.    9. Transportation insecurity  Referral made to care management for transportation problems.  He has no reliable form of transportation, cannot drive.  I also think he should be assessed for home nurse aide, but he is not interested in doing that at this point.  - Primary Care - Care Coordination Referral; Future    10. Hypercholesterolemia  Chronic, stable.  Lipids done today, not sure if he is fasting.  He is supposed to be taking atorvastatin, unsure if he is taking this.  - Comprehensive metabolic panel    11. Hyperglycemia  History of hyper glycemia.  Last A1c from 2020 was normal.  Rechecked today and I will follow-up with results.  - Lipid Profile    12.  Healthcare  maintenance   Flu shot 10 COVID booster shot given today.  Given significant amount of other issues discussed today, no further health maintenance was done at visit today.  Patient has appointment with PCP in a few weeks to follow-up.     35 minutes spent on the date of the encounter doing chart review, history and exam, and documentation.      This dictation uses voice recognition software, which may contain typographical errors.

## 2023-10-10 NOTE — COMMUNITY RESOURCES LIST (ENGLISH)
10/10/2023   [facility]  N/A  For questions about this resource list or additional care needs, please contact your primary care clinic or care manager.  Phone: [phone]   Email: N/A   Address: [address] [place] [address]   Hours: N/A        Transportation       Free or low-cost transportation  1  [employer] Distance: 4.21 miles      In-Person   [address] [place] [address]  Language: English, Citizen of [place]  Hours: Mon 9:00 AM - 5:00 PM , Tue 9:30 AM - 7:00 PM , Wed 9:00 AM - 5:00 PM , Thu 9:30 AM - 7:00 PM , Fri 9:00 AM - 5:00 PM  Fees: Free   Phone: (771) 635-6947 Email: contactus@[employer] Website: http://www.[employer]     2  [employer]  The [facility] Circulator Bus Distance: 4.79 miles      In-Person   [address] [place] [address]  Language: English  Hours: Tue 9:00 AM - 2:00 PM  Fees: Self Pay   Phone: (993) 180-5694 Email: info@[employer] Website: http://www.[employer].org/     Transportation to medical appointments  3  [facility] [employer] - Non-Emergency Medical Transportation Distance: 1.36 miles      In-Person   [address] [place] [address]  Language: English  Hours: Mon - Fri 8:00 AM - 4:00 PM Appt. Only  Fees: Self Pay   Phone: (853) 669-4490 Website: http://www.[facility].org/Medical-Services/Emergency-medical-services/Non-emergency-transportation/     4  [employer] Ride Distance: 3.49 miles      In-Person   [address] [address]  Language: English  Hours: Mon - Thu 6:00 AM - 6:00 PM , Fri 6:00 AM - 5:00 PM  Fees: Insurance, Self Pay   Phone: (339) 364-5663 Email: office@[employer] Website: https://www.[employer]/          Important Numbers & Websites       Emergency Services   911  City Services   311  Poison Control   (211) 198-9103  Suicide Prevention Lifeline   (509) 791-4321 (TALK)  Child Abuse Hotline   (451) 843-6709 (4-A-Child)  Sexual Assault Hotline   (325) 260-3133  (HOPE)  National Runaway Safeline   (412) 845-7535 (RUNAWAY)  [employer] Talkline   (441) 131-5238  Substance Abuse Referral   (976) 309-6763 (HELP)

## 2023-10-11 ENCOUNTER — PATIENT OUTREACH (OUTPATIENT)
Dept: CARE COORDINATION | Facility: CLINIC | Age: 63
End: 2023-10-11
Payer: MEDICARE

## 2023-10-11 NOTE — LETTER
[facility] CARE COORDINATION  [address] [facility] [address]    October 12, 2023    Bright Lockett  [address] [place] [address]      Dear Bright,        I am a  clinic community health worker who works with Ladarius Soria MD with the [facility]. I have been trying to reach you recently to introduce Clinic Care Coordination. Below is a description of clinic care coordination and how I can further assist you.       The clinic care coordination team is made up of a registered nurse, , financial resource worker and community health worker who understand the health care system. The goal of clinic care coordination is to help you manage your health and improve access to the health care system. Our team works alongside your provider to assist you in determining your health and social needs. We can help you obtain health care and community resources, providing you with necessary information and education. We can work with you through any barriers and develop a care plan that helps coordinate and strengthen the communication between you and your care team.  Our services are voluntary and are offered without charge to you personally.    Please feel free to contact me at [phone] with any questions or concerns regarding care coordination and what we can offer.      We are focused on providing you with the highest-quality healthcare experience possible.      Sincerely,       Lina Cabral  Community Health Worker  [facility] Care Coordination  [email or web address]  [email or web address]   Office: [phone]  Fax: [phone]

## 2023-10-11 NOTE — PROGRESS NOTES
10/11/2023  Clinic Care Coordination Contact  Community Health Worker Initial Outreach  UTC/Voicemail    PCP referral: Resources for Transportation    Clinical Data: Care Coordinator Outreach: Discuss CCC enrollment   Outreach attempted x 1.  Left message on patient's voicemail with call back information and requested return call.    Plan: Care Coordinator will try to reach patient again in 1-3 business days.    CHW Outreach: 2nd attempt 10-12-23    Lina Cabral  Community Health Worker  [facility] Care Coordination  [email or web address]   Office: [phone]  Fax: [phone]

## 2023-10-12 NOTE — PATIENT INSTRUCTIONS
PAGE  Unable to reach patient to enroll or discuss CCC  Sent intro letter to patient with CHW contact info and transportation resources and information

## 2023-10-12 NOTE — PROGRESS NOTES
10/12/2023  Clinic Care Coordination Contact  Community Health Worker Initial Outreach  UTC/Voicemail    PCP referral: Resources for Transportation    Clinical Data: Care Coordinator Outreach: Discuss CCC enrollment    Outreach attempted x 2.  Left message on patient's voicemail with call back information and requested return call.    Plan: Care Coordinator will send care coordination introduction letter with care coordinator contact information and explanation of care coordination services via [employer]  [facility]    Care Coordinator will do no further outreaches at this time.    Routed to PCP as PAGE Cabral  Community Health Worker  [facility] Care Coordination  [email or web address]   Office: [phone]  Fax: [phone]

## 2023-10-17 ENCOUNTER — NURSE TRIAGE (OUTPATIENT)
Dept: NURSING | Facility: CLINIC | Age: 63
End: 2023-10-17
Payer: MEDICARE

## 2023-10-17 ENCOUNTER — TELEPHONE (OUTPATIENT)
Dept: FAMILY MEDICINE | Facility: CLINIC | Age: 63
End: 2023-10-17
Payer: MEDICARE

## 2023-10-17 NOTE — TELEPHONE ENCOUNTER
Contacted patient and provided the following instructions per [employer]:    Measure the circumference of the flaccid penis using a measuring tape. The male anatomy is measured at the shaft, where its diameter is the largest. The circumference is measured in millimeters and is divided by 3.14(pi) to achieve the final measurement. For example, the circumference is 112 mm, so the result will be 112/3.14=35.66 mm.    S: 23-27 mm  M: 28-34 mm  L: 35-39 mm  XL: 40+ mm    If between two sizes, choose the larger size.    Patient verbalized understanding of instructions. He will complete measurements himself and provide information to [employer].

## 2023-10-17 NOTE — TELEPHONE ENCOUNTER
Left voicemail #1 for patient to return call to clinic. Please relay provider message and help schedule follow-up if patient calls back.

## 2023-10-17 NOTE — TELEPHONE ENCOUNTER
Scheduled patient for f/u visit with Dr. Soria as directed.    He was concerned about transportation to the clinic. It appears CCC referral was placed and several attempts were made to reach him about this.     A [employer] message was sent to him on 10/12 with several resources. I instructed patient to login to [employer] to view this message and return call to community health worker if he has further questions. He verbalized understanding.

## 2023-10-17 NOTE — TELEPHONE ENCOUNTER
Telephone call:    Condom catheter referral was put in for patient.  Patient reports [facility] is requesting patient be measured by PCP for this cath.  Patient would like this addressed ASAP as he reports having a procedure coming up on 11/14/2023.    Please review & follow up w/ patient.    Louann Farias RN on 10/17/2023 at 1:26 PM     Reason for Disposition   Health information question, no triage required and triager able to answer question    Protocols used: Information Only Call - No Triage-A-OH

## 2023-10-17 NOTE — TELEPHONE ENCOUNTER
----- Message from Rajendra Cohn MA sent at 10/16/2023  3:44 PM CDT -----    ----- Message -----  From: Indigo Rivera PA-C  Sent: 10/16/2023   3:43 PM CDT  To: Nicole Sood Care Team Pool    His labs look OK.  His kidney tests are a little worse than last time we checked.  He was supposed to have an in-person follow-up visit with Dr. Soria in 2 weeks.  Can someone help him schedule this please?

## 2023-10-18 ENCOUNTER — TELEPHONE (OUTPATIENT)
Dept: FAMILY MEDICINE | Facility: CLINIC | Age: 63
End: 2023-10-18
Payer: MEDICARE

## 2023-10-18 NOTE — TELEPHONE ENCOUNTER
Forms/Letter Request    Type of form/letter:  [facility]     Have you been seen for this request: No    Do we have the form/letter: Yes: Need circumference of penis which is 3in    Who is the form from? [facility]  (if other please explain)    Where did/will the form come from? Patient or family brought in       When is form/letter needed by: ASAP    How would you like the form/letter returned: Fax : [facility]     Patient Notified form requests are processed in 3-5 business days:Yes    Could we send this information to you in [employer] or would you prefer to receive a phone call?:   Patient would prefer a phone call   Okay to leave a detailed message?: Yes at Cell number on file:    Telephone Information:   Mobile [phone]

## 2023-10-19 NOTE — TELEPHONE ENCOUNTER
Sarai, I just looked for this form in the scan files and didn't see it- do you have this.  I don't think I have put the measurement on there

## 2023-10-20 NOTE — LETTER
Bright Lockett  [address] [place] [address]          October 25, 2023      Dear Bright,    As a valued [facility] patient, your healthcare needs are our priority.    Your health care team has determined that you are due for an appointment  Urology  .   We encourage you to call or schedule an appointment with your primary care provider to discuss your overdue screening and schedule an appointment.     If you already have had your screening performed at another health care facility, please ask that practice to send your results to [facility] [phone] and we will update your health records. This will ensure you receive the best possible care from our providers.      If you have any questions or need help with scheduling, please call the [facility] at [phone].        Yours in health,       Your care team at [facility]

## 2023-10-20 NOTE — LETTER
October 20, 2023  Re: Bright Lockett  YOB: 1960    Dear Colleague,    Thank you for your referral to the [facility] UROLOGY [facility]. We have been unable to schedule the referral after several contact attempts.      If you have any questions or concerns, please contact our office at Dept: [phone].        Sincerely,  [facility] UROLOGY [facility]

## 2023-10-24 NOTE — PROGRESS NOTES
LMTCB #2. If pt calls back, please help pt scheduled urology appt. Also gave pt urology number [phone]. Thanks!

## 2023-10-25 NOTE — PROGRESS NOTES
LMTCB #3. If pt calls back, please help pt scheduled urology appt. Also gave pt urology number [phone]. Will send out a letter.Thanks!

## 2023-10-30 NOTE — TELEPHONE ENCOUNTER
Pt called to follow-up on status of condom catheter. States he has provided penis shaft circumference measurement is 3 inch (76.2 mm /3.14 = 24.26 mm).     RN called [facility] [phone]  and spoke to Milton. Provided the above measurement. Milton states they don't have 24 mm but they have 25mm condom cath size. Milton also confirmed they have order from Dr. Soria, so will accept verbal size. Will put in quantity for 30. Will need medical records for subsequent orders, but will be able to first order without medical records. Milton will send in request for medical records. Per Milton, will process this afternoon and have supply sent out in 1 to 2 days.     RN called pt and relayed info. Advised pt to call [facility] to follow-up if he doesn't received supply within said timeframe.     Deborah MCCALL RN  [facility]

## 2023-11-01 ENCOUNTER — TELEPHONE (OUTPATIENT)
Dept: FAMILY MEDICINE | Facility: CLINIC | Age: 63
End: 2023-11-01

## 2023-11-01 ENCOUNTER — MEDICAL CORRESPONDENCE (OUTPATIENT)
Dept: HEALTH INFORMATION MANAGEMENT | Facility: CLINIC | Age: 63
End: 2023-11-01
Payer: MEDICARE

## 2023-11-01 NOTE — TELEPHONE ENCOUNTER
Pt calling regarding condom catheter. Pt stated they received the supplies but still need connection tube an leg bag. Pt stated he called [facility] and they will get that for him. Pt stated [facility] will also be calling regarding catheter. Pt would like message sent to PCP as PAGE.      Ishaan Ace Cem Say, BSN RN  [facility]

## 2023-11-02 ENCOUNTER — MEDICAL CORRESPONDENCE (OUTPATIENT)
Dept: HEALTH INFORMATION MANAGEMENT | Facility: CLINIC | Age: 63
End: 2023-11-02
Payer: MEDICARE

## 2023-11-09 ENCOUNTER — MEDICAL CORRESPONDENCE (OUTPATIENT)
Dept: HEALTH INFORMATION MANAGEMENT | Facility: CLINIC | Age: 63
End: 2023-11-09
Payer: MEDICARE

## 2023-11-13 DIAGNOSIS — H40.9 GLAUCOMA: Primary | ICD-10-CM

## 2023-11-13 NOTE — PROGRESS NOTES
Chief Complaint/Presenting Concern: Glaucoma, Kisha's anomaly, cataract    History of Present Illness:   Bright Lockett is a 63 year old patient who presents for evaluation of glaucoma, cataracts, and Kisha's anomaly. Per chart note from [facility] on 07/07/23, he previously was followed by Dr. Ritter, but had not been on drops for years at that time. At that visit, his eye pressure was in the 30s.    He has a history of glaucoma, Reiger's anomaly, and a cataract in the left eye. He is here to evaluate how to handle the cataract in the context of the Reiger's anomaly. He states he's been using an eyedrop twice daily in the left eye for the past month, per chart review this is timolol.    Patient states in the last 1-1.5 weeks he has not been able to see the cursor on his computer screen. He also describes recognizing that a incandescence light in his house has started to appear dimmer, which he attributes to cataract progression. At this time, he only able to identify sources of light.    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 Dillon); Per patient, has had at least two surgeries in the left eye  Maximum intraocular pressure x/35 mmHg  Currently Meds: Timolol twice daily in left eye (previously at up  to three eye drops)  Family history: positive: Possibly mother  CCT (um): 11/14/2023: x/555  Gonio: 11/14/2023:   Trauma history: negative  Steroid exposure: negative  Vasospastic disease: Migrane/Raynaud phenomenon: positive, Migraines  A past hemodynamic crisis or Low BP:: positive: History of GI surgery to remove blockage, patient has to be resuscitated on table  Meds AEs/intolerance: None  PMHx: Heart failure, Chronic kidney disease  Anticoagulants: None    Today's testing:  Visual field November 14, 2023: Unable to obtain   OCT Optic Nerve RNFL Spectralis November 14, 2023  right eye: X  left eye: Unable to obtain adequate image    Additional Ocular History:   Enucleation, right eye (~1975)    Kisha's anomaly, left eye    Nuclear sclerosis, left eye  Bruenscent   Reports impacts ADL   No Fuchs Dystrophy  Candidate for multifocal: NO  Candidate for toric IOL: NO  Anticoagulants: NO    Plan/Recommendations:  Discussed findings with patient.  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 Recommend tube shunt surgery. Patient has a brunescent cataract and will need a combined cataract extraction with Ahmed shunt surgery. The cataract will likely need to be removed through extracap approach, due to iris defect if the IOP doesn't end up in the bag it will need to likely be scleral fixated. Patient was seen by Dr. Madan alejo who agrees on performing the cataract surgery and possible vitrectomy if needed at the time of the tube shunt surgery.   Risks and benefits of cataract extraction in the left eye with intraocular lens implant and Ahmed tube shunt surgery, including risks of bleeding, infection, need for additional surgery, failure of surgery, and vision loss were explained to patient, who expressed understanding and wishes to proceed with surgery    Schedule Cataract surgery and Ahmed tube shunt surgery left eye, general anesthesia     Physician Attestation     Attending Physician  Attestation:  Complete documentation of historical and exam elements from today's encounter can be found in the full encounter summary report (not reduplicated in this progress note). I personally obtained the chief complaint(s) and history of present illness. I confirmed and edited as necessary the review of systems, past medical/surgical history, family history, social history, and examination findings as documented by others; and I examined the patient myself. I personally reviewed the relevant tests, images, and reports as documented above. I personally reviewed the ophthalmic test(s) associated with this encounter, agree with the interpretation(s) as documented by the resident/fellow and have edited the corresponding report(s) as necessary. I formulated and edited as necessary the assessment and plan and discussed the findings and management plan with the patient and any family members present at the time of the visit.  Floir Hamilton M.D., Glaucoma, November 13, 2023

## 2023-11-14 ENCOUNTER — OFFICE VISIT (OUTPATIENT)
Dept: OPHTHALMOLOGY | Facility: CLINIC | Age: 63
End: 2023-11-14
Attending: STUDENT IN AN ORGANIZED HEALTH CARE EDUCATION/TRAINING PROGRAM
Payer: MEDICARE

## 2023-11-14 ENCOUNTER — OFFICE VISIT (OUTPATIENT)
Dept: OPHTHALMOLOGY | Facility: CLINIC | Age: 63
End: 2023-11-14
Attending: OPHTHALMOLOGY
Payer: MEDICARE

## 2023-11-14 DIAGNOSIS — H40.9 ADVANCED STAGE GLAUCOMA: ICD-10-CM

## 2023-11-14 DIAGNOSIS — H25.12 CATARACTA BRUNESCENS OF LEFT EYE: Primary | ICD-10-CM

## 2023-11-14 DIAGNOSIS — H54.40: ICD-10-CM

## 2023-11-14 DIAGNOSIS — Q13.81: ICD-10-CM

## 2023-11-14 DIAGNOSIS — H40.52X3 GLAUCOMA ASSOCIATED WITH OCULAR DISORDER, LEFT, SEVERE STAGE: ICD-10-CM

## 2023-11-14 DIAGNOSIS — H25.12 NUCLEAR SCLEROSIS OF LEFT EYE: ICD-10-CM

## 2023-11-14 PROCEDURE — 92133 CPTRZD OPH DX IMG PST SGM ON: CPT | Performed by: OPHTHALMOLOGY

## 2023-11-14 PROCEDURE — 76514 ECHO EXAM OF EYE THICKNESS: CPT | Performed by: OPHTHALMOLOGY

## 2023-11-14 PROCEDURE — 76519 ECHO EXAM OF EYE: CPT | Performed by: OPHTHALMOLOGY

## 2023-11-14 PROCEDURE — 99204 OFFICE O/P NEW MOD 45 MIN: CPT | Performed by: OPHTHALMOLOGY

## 2023-11-14 PROCEDURE — 92025 CPTRIZED CORNEAL TOPOGRAPHY: CPT | Performed by: OPHTHALMOLOGY

## 2023-11-14 PROCEDURE — G0463 HOSPITAL OUTPT CLINIC VISIT: HCPCS | Performed by: OPHTHALMOLOGY

## 2023-11-14 PROCEDURE — 76512 OPH US DX B-SCAN: CPT | Performed by: OPHTHALMOLOGY

## 2023-11-14 PROCEDURE — 999N000103 HC STATISTIC NO CHARGE FACILITY FEE

## 2023-11-14 PROCEDURE — 99214 OFFICE O/P EST MOD 30 MIN: CPT | Mod: 25 | Performed by: OPHTHALMOLOGY

## 2023-11-14 RX ORDER — NETARSUDIL AND LATANOPROST OPHTHALMIC SOLUTION, 0.02%/0.005% .2; .05 MG/ML; MG/ML
1 SOLUTION/ DROPS OPHTHALMIC; TOPICAL AT BEDTIME
Qty: 2.5 ML | Refills: 11 | Status: SHIPPED | OUTPATIENT
Start: 2023-11-14 | End: 2024-04-23

## 2023-11-14 RX ORDER — TIMOLOL MALEATE 5 MG/ML
1 SOLUTION/ DROPS OPHTHALMIC DAILY
Qty: 10 ML | Refills: 11 | Status: SHIPPED | OUTPATIENT
Start: 2023-11-14 | End: 2024-01-11

## 2023-11-14 ASSESSMENT — PACHYMETRY: OS_CT(UM): 555

## 2023-11-14 ASSESSMENT — TONOMETRY
OS_IOP_MMHG: 23
OS_IOP_MMHG: 24
IOP_METHOD: TONOPEN
IOP_METHOD: TONOPEN
OD_IOP_MMHG: PROS
OS_IOP_MMHG: 24
IOP_METHOD: TONOPEN

## 2023-11-14 ASSESSMENT — CONF VISUAL FIELD
OS_INFERIOR_NASAL_RESTRICTION: 1
OS_INFERIOR_NASAL_RESTRICTION: 1
OS_SUPERIOR_TEMPORAL_RESTRICTION: 1
OS_SUPERIOR_NASAL_RESTRICTION: 1
OS_SUPERIOR_NASAL_RESTRICTION: 1
OS_SUPERIOR_TEMPORAL_RESTRICTION: 1
OS_INFERIOR_TEMPORAL_RESTRICTION: 1
OS_INFERIOR_TEMPORAL_RESTRICTION: 1

## 2023-11-14 ASSESSMENT — VISUAL ACUITY
OS_SC: HM
OD_SC: PROS
METHOD: SNELLEN - LINEAR
METHOD: SNELLEN - LINEAR
OS_SC: HM
OD_SC: PROS

## 2023-11-14 NOTE — PROGRESS NOTES
CC: Glaucoma, Kisha's anomaly, cataract    HPI:   Bright Lockett is a 63 year old patient who is referred by Dr Hamilton for combine cataract and glaucoma surgery in his only eye (left). He has a history of glaucoma, Reiger's anomaly, and a cataract in the left eye. He is referred to us to evaluate how to handle the cataract in the context of the Reiger's anomaly. He states he's been using an eyedrop twice daily in the left eye for the past month, per chart review this is timolol.  Dr Hamilton is planning to perform a glaucoma shunt for him and since cataract is extremely dense and there is a risk of capsule break and iris is extremely atrophic and abnormal, Dr Hamilton recommends extracapsular cataract extraction and IOL (sulcus vs yamane).    He is here with his sister, Lynn.    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  Will need to cauterize the floating iris vessels stretching to the corectopic pupil and even removal of the  atrophic iris remnants with vitrector  Risks of cataract extraction surgery including but not limited to infection (rare but a devastating complication that will need intraocular antibiotics injection and possibly more surgeries), risk of retinal detachment, dropped nuclear pieces or dropped intraocular lens, glaucoma may need further medications, corneal edema that may need a long course of medication or even surgery were discussed  In length with the patient. We discussed about the benefits of CE IOL surgery and its alternatives. All the questions answered. Bright agreed and wanted to proceed.     Case request placed for complex cataract surgery combined with glaucoma tube with Dr. 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 I personally obtained the chief complaint(s) and history of present illness.  I confirmed and edited as necessary the review of systems, past medical/surgical history, family history, social history, and examination findings as documented by others; and I examined the patient myself. I personally reviewed the relevant tests, images, and reports as documented above. I formulated and edited as necessary the assessment and plan and discussed the findings and management plan with the patient and family.    Dre Hager MD, PhD

## 2023-11-16 ENCOUNTER — PREP FOR PROCEDURE (OUTPATIENT)
Dept: OPHTHALMOLOGY | Facility: CLINIC | Age: 63
End: 2023-11-16
Payer: MEDICARE

## 2023-11-16 DIAGNOSIS — Q13.81 AXENFELD-RIEGER SYNDROME: Primary | ICD-10-CM

## 2023-11-16 DIAGNOSIS — H40.52X3 GLAUCOMA SECONDARY TO OTHER EYE DISORDERS, LEFT EYE, SEVERE STAGE: ICD-10-CM

## 2023-11-17 ENCOUNTER — DOCUMENTATION ONLY (OUTPATIENT)
Dept: OPHTHALMOLOGY | Facility: CLINIC | Age: 63
End: 2023-11-17
Payer: MEDICARE

## 2023-11-17 ENCOUNTER — PREP FOR PROCEDURE (OUTPATIENT)
Dept: OPHTHALMOLOGY | Facility: CLINIC | Age: 63
End: 2023-11-17
Payer: MEDICARE

## 2023-11-17 NOTE — PROGRESS NOTES
Patient is schedule for surgery with: Dr. Hager & Dr. Hamilton    Surgery Date: 12/18     Location: [facility]    H&P: to be completed by Primary Care team - patient instructed to schedule [facility]     Post-op: 12/26 (both providers), 1/16 (Madan)    Patient will receive a phone call from pre-admission nurses 1-2 days prior to surgery with arrival time and NPO instructions.    Patient aware times are subject to change up until day before surgery.     Patient questions/concerns: Leeanne is asking that Peter be kept overnight for observation. A staff message was sent to the team.     Surgery packet was sent via US mail to sister Leeanne on 11/17      Stephani Mahoney on 11/17/2023 at 11:27 AM

## 2023-11-22 ASSESSMENT — SLIT LAMP EXAM - LIDS
COMMENTS: NORMAL
COMMENTS: NORMAL

## 2023-11-22 ASSESSMENT — EXTERNAL EXAM - LEFT EYE: OS_EXAM: NORMAL

## 2023-11-22 ASSESSMENT — EXTERNAL EXAM - RIGHT EYE: OD_EXAM: NORMAL

## 2023-11-29 NOTE — ASSESSMENT & PLAN NOTE
Did a trial of stopping medications and realized that it was the atorvastatin that was making him tired.  Cut back to 20 mg daily, problem got better.  We will continue this level.  
Doing well, no significant side effects of tamsulosin.  Continue medication  
Really controlled today because he is out of his losartan.  Refill this medication, ask him to come in in the near future for nurse only check.  Previous blood pressures were good on this medication, anticipate success  
Reordered echocardiogram as patient overslept and missed his appointment  
No assistance needed

## 2023-12-04 DIAGNOSIS — I10 ESSENTIAL HYPERTENSION: ICD-10-CM

## 2023-12-04 DIAGNOSIS — N40.1 BENIGN PROSTATIC HYPERPLASIA WITH LOWER URINARY TRACT SYMPTOMS, SYMPTOM DETAILS UNSPECIFIED: ICD-10-CM

## 2023-12-05 ENCOUNTER — OFFICE VISIT (OUTPATIENT)
Dept: FAMILY MEDICINE | Facility: CLINIC | Age: 63
End: 2023-12-05
Payer: MEDICARE

## 2023-12-05 VITALS
DIASTOLIC BLOOD PRESSURE: 78 MMHG | HEIGHT: 65 IN | BODY MASS INDEX: 27.91 KG/M2 | HEART RATE: 76 BPM | SYSTOLIC BLOOD PRESSURE: 166 MMHG | TEMPERATURE: 98.3 F | WEIGHT: 167.5 LBS | OXYGEN SATURATION: 96 % | RESPIRATION RATE: 17 BRPM

## 2023-12-05 DIAGNOSIS — Z01.818 PREOP EXAMINATION: Primary | ICD-10-CM

## 2023-12-05 DIAGNOSIS — N18.31 STAGE 3A CHRONIC KIDNEY DISEASE (H): ICD-10-CM

## 2023-12-05 DIAGNOSIS — I50.32 CHRONIC HEART FAILURE WITH PRESERVED EJECTION FRACTION (H): ICD-10-CM

## 2023-12-05 DIAGNOSIS — Z00.00 HEALTHCARE MAINTENANCE: ICD-10-CM

## 2023-12-05 DIAGNOSIS — N40.1 BENIGN PROSTATIC HYPERPLASIA WITH LOWER URINARY TRACT SYMPTOMS, SYMPTOM DETAILS UNSPECIFIED: ICD-10-CM

## 2023-12-05 DIAGNOSIS — H91.90 HEARING LOSS, UNSPECIFIED HEARING LOSS TYPE, UNSPECIFIED LATERALITY: ICD-10-CM

## 2023-12-05 DIAGNOSIS — R80.9 PROTEINURIA, UNSPECIFIED TYPE: ICD-10-CM

## 2023-12-05 DIAGNOSIS — I10 ESSENTIAL HYPERTENSION: Chronic | ICD-10-CM

## 2023-12-05 LAB
ALBUMIN SERPL BCG-MCNC: 3.9 G/DL (ref 3.5–5.2)
ALP SERPL-CCNC: 68 U/L (ref 40–150)
ALT SERPL W P-5'-P-CCNC: 8 U/L (ref 0–70)
ANION GAP SERPL CALCULATED.3IONS-SCNC: 11 MMOL/L (ref 7–15)
AST SERPL W P-5'-P-CCNC: 16 U/L (ref 0–45)
BILIRUB SERPL-MCNC: 0.8 MG/DL
BUN SERPL-MCNC: 33.3 MG/DL (ref 8–23)
CALCIUM SERPL-MCNC: 9.2 MG/DL (ref 8.8–10.2)
CHLORIDE SERPL-SCNC: 104 MMOL/L (ref 98–107)
CHOLEST SERPL-MCNC: 176 MG/DL
CREAT SERPL-MCNC: 0.98 MG/DL (ref 0.67–1.17)
CREAT UR-MCNC: 66.5 MG/DL
DEPRECATED HCO3 PLAS-SCNC: 26 MMOL/L (ref 22–29)
EGFRCR SERPLBLD CKD-EPI 2021: 86 ML/MIN/1.73M2
GLUCOSE SERPL-MCNC: 119 MG/DL (ref 70–99)
HDLC SERPL-MCNC: 38 MG/DL
HGB BLD-MCNC: 16.9 G/DL (ref 13.3–17.7)
LDLC SERPL CALC-MCNC: 76 MG/DL
MICROALBUMIN UR-MCNC: 246 MG/L
MICROALBUMIN/CREAT UR: 369.92 MG/G CR (ref 0–17)
NONHDLC SERPL-MCNC: 138 MG/DL
POTASSIUM SERPL-SCNC: 4.1 MMOL/L (ref 3.4–5.3)
PROT SERPL-MCNC: 7.1 G/DL (ref 6.4–8.3)
SODIUM SERPL-SCNC: 141 MMOL/L (ref 135–145)
TRIGL SERPL-MCNC: 310 MG/DL
VIT D+METAB SERPL-MCNC: 21 NG/ML (ref 20–50)

## 2023-12-05 PROCEDURE — 99215 OFFICE O/P EST HI 40 MIN: CPT | Mod: 25 | Performed by: FAMILY MEDICINE

## 2023-12-05 PROCEDURE — 80061 LIPID PANEL: CPT | Performed by: FAMILY MEDICINE

## 2023-12-05 PROCEDURE — 82043 UR ALBUMIN QUANTITATIVE: CPT | Performed by: FAMILY MEDICINE

## 2023-12-05 PROCEDURE — 90480 ADMN SARSCOV2 VAC 1/ONLY CMP: CPT | Performed by: FAMILY MEDICINE

## 2023-12-05 PROCEDURE — 91320 SARSCV2 VAC 30MCG TRS-SUC IM: CPT | Performed by: FAMILY MEDICINE

## 2023-12-05 PROCEDURE — 36415 COLL VENOUS BLD VENIPUNCTURE: CPT | Performed by: FAMILY MEDICINE

## 2023-12-05 PROCEDURE — 90677 PCV20 VACCINE IM: CPT | Performed by: FAMILY MEDICINE

## 2023-12-05 PROCEDURE — 80053 COMPREHEN METABOLIC PANEL: CPT | Performed by: FAMILY MEDICINE

## 2023-12-05 PROCEDURE — 82570 ASSAY OF URINE CREATININE: CPT | Performed by: FAMILY MEDICINE

## 2023-12-05 PROCEDURE — 82306 VITAMIN D 25 HYDROXY: CPT | Performed by: FAMILY MEDICINE

## 2023-12-05 PROCEDURE — 85018 HEMOGLOBIN: CPT | Performed by: FAMILY MEDICINE

## 2023-12-05 PROCEDURE — G0009 ADMIN PNEUMOCOCCAL VACCINE: HCPCS | Performed by: FAMILY MEDICINE

## 2023-12-05 RX ORDER — RESPIRATORY SYNCYTIAL VIRUS VACCINE 120MCG/0.5
0.5 KIT INTRAMUSCULAR ONCE
Qty: 1 EACH | Refills: 0 | Status: CANCELLED | OUTPATIENT
Start: 2023-12-05 | End: 2023-12-05

## 2023-12-05 RX ORDER — ACETAZOLAMIDE 250 MG/1
1 TABLET ORAL
COMMUNITY
Start: 2023-08-15 | End: 2024-04-23

## 2023-12-05 RX ORDER — TAMSULOSIN HYDROCHLORIDE 0.4 MG/1
CAPSULE ORAL
Qty: 90 CAPSULE | Refills: 3 | Status: SHIPPED | OUTPATIENT
Start: 2023-12-05

## 2023-12-05 RX ORDER — POTASSIUM CHLORIDE 750 MG/1
10 TABLET, EXTENDED RELEASE ORAL DAILY
COMMUNITY
Start: 2023-07-10 | End: 2024-04-23

## 2023-12-05 RX ORDER — CLONIDINE HYDROCHLORIDE 0.1 MG/1
0.1 TABLET ORAL 2 TIMES DAILY
Qty: 180 TABLET | Refills: 3 | Status: SHIPPED | OUTPATIENT
Start: 2023-12-05 | End: 2024-01-03

## 2023-12-05 RX ORDER — METOPROLOL SUCCINATE 200 MG/1
200 TABLET, EXTENDED RELEASE ORAL DAILY
Qty: 90 TABLET | Refills: 3 | Status: SHIPPED | OUTPATIENT
Start: 2023-12-05

## 2023-12-05 RX ORDER — LOSARTAN POTASSIUM 100 MG/1
100 TABLET ORAL DAILY
Qty: 90 TABLET | Refills: 3 | Status: SHIPPED | OUTPATIENT
Start: 2023-12-05

## 2023-12-05 NOTE — LETTER
"My Heart Failure Action Plan  Name: Brihgt Lockett   YOB: 1960  Date: 12/5/2023   My doctor:   Ladarius Soria [facility] [address] [place] [id number] [phone] My Diagnosis: {HF TYPE:213542}  My Ejection Fraction: No results found for: \"LVEF\"  {EF% (Optional):288803}  My Exercise Goal: Start exercise slowly - to begin, do a few minutes of exercise, several times a day. Increase your time and speed [id number] to build tolerance, with a goal of 30 minutes of exercise daily. Steady, slow, and consistent exercise is both safe and healthy. Stop and rest when you feel tired or become short of breath. Do not push yourself on days when you don t feel well.       My Weight Plan:   Wt Readings from Last 2 Encounters:   10/10/23 77.1 kg (170 lb)   07/14/22 76.8 kg (169 lb 4 oz)     Weigh yourself daily using the same scale. If you gain more than 2 pounds in 24 hours or 5 pounds in 7 days. {:192308}    My Diet Goal: { :758529::\"No added salt\"}    Emergency Room Visits:    Our goal is to improve your quality of life and help you avoid a visit to the emergency room or hospital.  If we work together, we can achieve this goal. But, if you feel you need to call 911 or go to the emergency room, please do so.  If you go to the emergency room, please bring your list of medicines and your daily weight chart with you.    Each day ask yourself:  Is my weight up?  Do I have swelling?  Do I have trouble breathing?  How did I sleep?  Other problems?       GREEN ZONE     Weight gained is no more than 2 pounds a day or 5 pounds a in 7 days.  No swelling in feet, ankles, legs or stomach.  No more swelling than usual.  No more trouble breathing than usual.  No change in my sleep.  No other problems. What should I do?  I am doing fine. I will take my medicine, follow my diet, see my doctor, exercise, and watch for symptoms.           YELLOW ZONE         Weight gain of more than " 2 pounds in one day or 5 pounds in 7 days.  New swelling in ankle, leg, knee or thigh.  Bloating in belly, pants feel tighter.  Swelling in hands or face.  Coughing or trouble breathing while walking or talking.  Harder to breathe last night.  Have trouble sleeping, wake up short of breath.  Unusually tired.  Not eating.  Nausea, vomiting, or diarrhea  Pain in my chest or bad  leg cramps.  Feel weak or dizzy. What should I do?  I need to take action and call my doctor or nurse today.                 RED ZONE         Weight gain of 5 pounds overnight.  Chest pain or pressure that does not go away.  Feel less alert.  Wheezing or have trouble breathing when at rest.  Cannot sleep lying down.  Cannot take my medicines.  Pass out or faint. What should I do?  I need to call my doctor or nurse now!  Call 911 if I have chest pain or cannot breathe.

## 2023-12-05 NOTE — COMMUNITY RESOURCES LIST (ENGLISH)
12/05/2023   [facility]  N/A  For questions about this resource list or additional care needs, please contact your primary care clinic or care manager.  Phone: [phone]   Email: N/A   Address: [address] [place] [id number]   Hours: N/A        Transportation       Free or low-cost transportation  1  [employer] Distance: 4.21 miles      In-Person   [address] [place] [address]  Language: English, Turkish  Hours: Mon 9:00 AM - 5:00 PM , Tue 9:30 AM - 7:00 PM , Wed 9:00 AM - 5:00 PM , Thu 9:30 AM - 7:00 PM , Fri 9:00 AM - 5:00 PM  Fees: Free   Phone: (132) 691-2736 Email: contactus@[employer] Website: http://www.[employer]     2  modu  [facility] Circulator Bus Distance: 4.79 miles      In-Person   [address] [place] [address]  Language: English  Hours: Tue 9:00 AM - 2:00 PM  Fees: Self Pay   Phone: (233) 390-7897 Email: info@[employer] Website: http://www.[employer].org/     Transportation to medical appointments  3  [employer] [facility] - Non-Emergency Medical Transportation Distance: 1.36 miles      In-Person   [address] [place] [address]  Language: English  Hours: Mon - Fri 8:00 AM - 4:00 PM Appt. Only  Fees: Self Pay   Phone: (552) 695-4136 Website: http://www.[facility].org/Medical-Services/Emergency-medical-services/Non-emergency-transportation/     4  [employer] Distance: 3.49 miles      In-Person   [address] [address]  Language: English  Hours: Mon - Thu 6:00 AM - 6:00 PM , Fri 6:00 AM - 5:00 PM  Fees: Insurance, Self Pay   Phone: (344) 719-4445 Email: office@[employer] Website: https://www.[employer]/          Important Numbers & Websites       Emergency Services   911  City Services   311  Poison Control   (846) 442-2000  Suicide Prevention Lifeline   (696) 251-1956 (TALK)  Child Abuse Hotline   (446) 359-4560 (4-A-Child)  Sexual Assault Hotline   (681) 271-2657  (HOPE)  National Runaway Safeline   (585) 848-7947 (RUNAWAY)  [employer] Talkline   (397) 969-8423  Substance Abuse Referral   (592) 243-8640 (HELP)

## 2023-12-05 NOTE — PROGRESS NOTES
[facility] [address] [place] [address]  Phone: [phone]  Fax: [phone]  Primary Provider: Ladarius Soria  Pre-op Performing Provider: LADARIUS SORIA      PREOPERATIVE EVALUATION:  Today's date: 12/5/2023    Bright is a 63 year old, presenting for the following:  Pre-Op Exam        12/5/2023     9:10 AM   Additional Questions   Roomed by Dorota MONTES   Accompanied by Rachele- Sister       Surgical Information:  Surgery/Procedure: LEFT PHACOEMULSIFICATION, CATARACT, WITH INTRAOCULAR LENS IMPLANT   INSERTION, GLAUCOMA SHUNT IMPLANT, EYE - Left   Surgery Location: [facility]  Surgeon: Dr. Hager  Surgery Date: 12/18/23  Time of Surgery: 12:15pm  Where patient plans to recover: At home with family  Fax number for surgical facility: Note does not need to be faxed, will be available electronically in Epic.        Subjective       HPI related to upcoming procedure: Patient with history of static eye right side, gradual decreased visual loss and glaucoma on left side.  Plan cataract extraction/[place], lens implant plus aqueous channel         12/5/2023     8:44 AM   Preop Questions   1. Have you ever had a heart attack or stroke? No   2. Have you ever had surgery on your heart or blood vessels, such as a stent placement, a coronary artery bypass, or surgery on an artery in your head, neck, heart, or legs? No   3. Do you have chest pain with activity? No   4. Do you have a history of  heart failure? No   5. Do you currently have a cold, bronchitis or symptoms of other infection? No   6. Do you have a cough, shortness of breath, or wheezing? No   7. Do you or anyone in your family have previous history of blood clots? No   8. Do you or does anyone in your family have a serious bleeding problem such as prolonged bleeding following surgeries or cuts? No   9. Have you ever had problems with anemia or been told to take iron pills? No   10. Have you  had any abnormal blood loss such as black, tarry or bloody stools? No   11. Have you ever had a blood transfusion? UNKNOWN -    12. Are you willing to have a blood transfusion if it is medically needed before, during, or after your surgery? Yes   13. Have you or any of your relatives ever had problems with anesthesia? No   14. Do you have sleep apnea, excessive snoring or daytime drowsiness? YES -snoring   14a. Do you have a CPAP machine? No   15. Do you have any artifical heart valves or other implanted medical devices like a pacemaker, defibrillator, or continuous glucose monitor? No   16. Do you have artificial joints? No   17. Are you allergic to latex? No           Review of Systems  Full 10 system review including constitutional, respiratory, cardiac, gi, urinary, rheumatologic, neurologic, reproductive, dermatologic psychiatric is  performed  and is otherwise negative       Patient Active Problem List    Diagnosis Date Noted    Left ventricular hypertrophy 07/14/2022     Priority: Medium     Formatting of this note might be different from the original.  Created by Conversion      Last Assessment & Plan:   Formatting of this note might be different from the original.  Noted on echocardiogram again, probably some diastolic dysfunction.  Discussed importance of good blood pressure control.      Hypertensive heart and chronic kidney disease with heart failure and stage 1 through stage 4 chronic kidney disease, or unspecified chronic kidney disease (H) 07/14/2022     Priority: Medium    Heart failure with preserved ejection fraction (H) 06/10/2022     Priority: Medium    Edema, unspecified type 03/31/2021     Priority: Medium    Essential hypertension      Priority: Medium     Created by Conversion  Replacement Utility updated for latest IMO load        Cervical lymphadenopathy 12/30/2020     Priority: Medium    Hyperglycemia 12/30/2020     Priority: Medium    Stage 3a chronic kidney disease (H) 12/30/2020      Priority: Medium    Hypercholesterolemia      Priority: Medium     16.1% 10-year risk based on 6/18/2019 calculation        Left Ventricular Hypertrophy      Priority: Medium     Created by Conversion        Daytime sleepiness 07/18/2019     Priority: Medium    Unspecified glaucoma      Priority: Medium     Created by Conversion        Healthcare maintenance 06/18/2019     Priority: Medium    Benign prostatic hyperplasia with lower urinary tract symptoms, symptom details unspecified 06/18/2019     Priority: Medium    Urinary frequency 08/15/2018     Priority: Medium    Headache 03/07/2018     Priority: Medium    Cough 03/07/2018     Priority: Medium    Proteinuria 01/15/2018     Priority: Medium     300+  mg on 24-hour urine collection August 2019.  Serology for secondary causes negative  Due to previously uncontrolled hypertension        Osteoarthritis Of The Knee      Priority: Medium     Created by Conversion  Replacement Utility updated for latest IMO load    Replacing diagnoses that were inactivated after the 10/1/2021 regulatory import.      Cerebral Palsy      Priority: Medium     Created by Conversion  Replacement Utility updated for latest IMO load        Esophageal reflux      Priority: Medium     Created by Conversion        Aortic Sclerosis      Priority: Medium     Created by Conversion  Replacement Utility updated for latest IMO load        Diverticulosis      Priority: Medium     Created by Conversion  Replacement Utility updated for latest IMO load        Acute bilateral low back pain without sciatica 05/12/2015     Priority: Medium    Neuritis      Priority: Medium     Created by Conversion        Benign Adenomatous Polyp Of The Large Intestine      Priority: Medium     Created by Conversion        Personal history of colonic polyps 11/30/2012     Priority: Medium     Formatting of this note might be different from the original.  Previous polyps; 11-30-12 two rectal polyps        Past Medical  History:   Diagnosis Date    Aortic sclerosis     Back pain     Benign neoplasm of adenomatous polyp     of the large intestine    Carpal tunnel syndrome     Cerebral palsy (H)     Diverticulosis     Esophageal reflux     Glaucoma     Hyperlipidemia     Hypertension     Left ventricular hypertrophy     Leg weakness     left    Neuritis     Osteoarthritis of knee      Past Surgical History:   Procedure Laterality Date    [facility] EXPLORATORY OF ABDOMEN      Description: Exploratory Laparotomy;  Recorded: 09/25/2008;  Comments: small bowel obstruction     Current Outpatient Medications   Medication Sig Dispense Refill    acetaZOLAMIDE (DIAMOX) 250 MG tablet Take 1 tablet by mouth 2 times daily      metoprolol succinate ER (TOPROL XL) 200 MG 24 hr tablet Take 1 tablet (200 mg) by mouth daily 90 tablet 3    potassium chloride ER (KLOR-CON M) 10 MEQ CR tablet Take 10 mEq by mouth daily      tamsulosin (FLOMAX) 0.4 MG capsule [TAMSULOSIN (FLOMAX) 0.4 MG CAP] TAKE 1 CAPSULE(0.4 MG) BY MOUTH DAILY AFTER BREAKFAST 90 capsule 3    ascorbic acid, vitamin C, (VITAMIN C) 1000 MG tablet [ASCORBIC ACID, VITAMIN C, (VITAMIN C) 1000 MG TABLET] Take 1,000 mg by mouth daily.      atorvastatin (LIPITOR) 20 MG tablet [ATORVASTATIN (LIPITOR) 20 MG TABLET] Take 1 tablet (20 mg total) by mouth at bedtime. 90 tablet 3    brinzolamide-brimonidine (SIMBRINZA) 1-0.2 % ophthalmic suspension Place 1 drop Into the left eye 3 times daily 8 mL 11    calcium-magnesium-zinc Tab [CALCIUM-MAGNESIUM-ZINC TAB] 1 tablet daily.      cyclobenzaprine (FLEXERIL) 5 MG tablet [CYCLOBENZAPRINE (FLEXERIL) 5 MG TABLET] Take 1 tablet (5 mg total) by mouth 3 (three) times a day as needed for muscle spasms. (Patient not taking: Reported on 12/5/2023) 30 tablet 3    finasteride (PROSCAR) 5 MG tablet [FINASTERIDE (PROSCAR) 5 MG TABLET] TAKE 1 TABLET(5 MG) BY MOUTH DAILY 90 tablet 3    fluticasone (ALLERGY RELIEF, FLUTICASONE,) 50 mcg/actuation nasal spray [FLUTICASONE  "(ALLERGY RELIEF, FLUTICASONE,) 50 MCG/ACTUATION NASAL SPRAY] 2 sprays each nostril once daily 16 g 11    furosemide (LASIX) 20 MG tablet Take 1 tablet (20 mg) by mouth daily 90 tablet 2    HYDROcodone-acetaminophen 5-325 mg per tablet [HYDROCODONE-ACETAMINOPHEN 5-325 MG PER TABLET] Take 1 tablet by mouth every 6 (six) hours as needed for pain. (Patient not taking: Reported on 12/5/2023) 20 tablet 0    loratadine (CLARITIN) 10 mg tablet [LORATADINE (CLARITIN) 10 MG TABLET] Take 1 tablet (10 mg total) by mouth daily. 30 tablet 11    losartan (COZAAR) 100 MG tablet Take 1 tablet (100 mg) by mouth daily (Patient not taking: Reported on 12/5/2023) 90 tablet 3    losartan (COZAAR) 100 MG tablet Take 1 tablet (100 mg) by mouth daily (Patient not taking: Reported on 12/5/2023) 90 tablet 3    Netarsudil-Latanoprost (ROCKLATAN) 0.02-0.005 % SOLN Place 1 drop Into the left eye at bedtime 2.5 mL 11    omeprazole (PRILOSEC) 10 MG DR capsule Take 10 mg by mouth      omeprazole (PRILOSEC) 20 MG capsule [OMEPRAZOLE (PRILOSEC) 20 MG CAPSULE] Take 1 capsule (20 mg total) by mouth daily. 90 capsule 2    potassium chloride ER (K-TAB/KLOR-CON) 10 MEQ CR tablet Take 1 tablet (10 mEq) by mouth 2 times daily 180 tablet 2    timolol maleate (TIMOPTIC) 0.5 % ophthalmic solution Place 1 drop Into the left eye daily 10 mL 11    timolol maleate (TIMOPTIC) 0.5 % ophthalmic solution [TIMOLOL MALEATE (TIMOPTIC) 0.5 % OPHTHALMIC SOLUTION] Administer 1 drop to both eyes daily.         Allergies   Allergen Reactions    Morphine Nausea and Vomiting        Social History     Tobacco Use    Smoking status: Never     Passive exposure: Never    Smokeless tobacco: Never   Substance Use Topics    Alcohol use: No       History   Drug Use No         Objective     BP (!) 166/78   Pulse 76   Temp 98.3  F (36.8  C) (Oral)   Resp 17   Ht 1.645 m (5' 4.75\")   Wt 76 kg (167 lb 8 oz)   SpO2 96%   BMI 28.09 kg/m      Physical Exam  Gen- alert, oriented/ " appropriately responsive  In wheelchair, very hard of hearing  HEENT-prosthetic eye right side  Oral cavity grossly normal  Chest- Normal inspiration and expiration.    Clear to ascultation.    No chest wall deformity or scar.  CV- Heart regular rate and rhythm  normal tones, no murmurs   Abdomen-soft, and nontender, no organomegaly or masses.  No gallops or rubs.  Ext- appear well perfused, no edema  Skin- warm and dry, no concerning lesions/rash noted  Neuro exam grossly nonfocal-cranial nerves intact, normal gait, movements.  no visualized rash      Recent Labs   Lab Test 10/10/23  1133 07/14/22  1217   HGB 16.3 16.3     --     144   POTASSIUM 4.2 4.4   CR 1.35* 1.18*   A1C 5.6  --         Diagnostics:  Results for orders placed or performed in visit on 12/05/23   Hemoglobin     Status: Normal   Result Value Ref Range    Hemoglobin 16.9 13.3 - 17.7 g/dL   A1c, CMP, lipid and urine microalbumin are pending      Low to low intermediate risk surgery  History of diastolic heart failure  Blood pressure not adequately controlled but not severely hypertensive    Unknown functional capacity    Acceptable risk for surgery  No special recommendations for preop, acceptable candidate, antihypertensives including losartan and metoprolol and clonidine on morning of surgery.  Otherwise NPO  Await labs    For none surgery related issues:  Add clonidine for hypertension.  Concerned about bradycardia with concurrent use with beta-blocker but pulse adequate at 76, we will follow-up in 1 month for this  Referral to urology to manage severe urinary frequency, presumed related to BPH.  Patient has had adequate trial of tamsulosin and finasteride  Hearing loss, referral to ENT.    Vaccines including pneumococcal, COVID given today.  Suggested RSV and zoster at pharmacy.      A total of 50 minutes was spent on this visit reviewing previous notes, counseling patient, ordering tests , adjusting meds (see below) and  documenting the findings in this note         Signed Electronically by: Ladarius Soria MD  Copy of this evaluation report is provided to requesting physician.    50

## 2023-12-07 DIAGNOSIS — N40.1 BENIGN PROSTATIC HYPERPLASIA WITH LOWER URINARY TRACT SYMPTOMS, SYMPTOM DETAILS UNSPECIFIED: ICD-10-CM

## 2023-12-08 RX ORDER — FINASTERIDE 5 MG/1
TABLET, FILM COATED ORAL
Qty: 90 TABLET | Refills: 3 | Status: SHIPPED | OUTPATIENT
Start: 2023-12-08

## 2023-12-18 ENCOUNTER — ANESTHESIA EVENT (OUTPATIENT)
Dept: SURGERY | Facility: CLINIC | Age: 63
End: 2023-12-18
Payer: MEDICARE

## 2023-12-18 ENCOUNTER — HOSPITAL ENCOUNTER (OUTPATIENT)
Facility: CLINIC | Age: 63
Discharge: HOME | End: 2023-12-18
Attending: OPHTHALMOLOGY | Admitting: OPHTHALMOLOGY
Payer: MEDICARE

## 2023-12-18 ENCOUNTER — ANESTHESIA (OUTPATIENT)
Dept: SURGERY | Facility: CLINIC | Age: 63
End: 2023-12-18
Payer: MEDICARE

## 2023-12-18 VITALS
BODY MASS INDEX: 26.4 KG/M2 | HEIGHT: 66 IN | OXYGEN SATURATION: 98 % | DIASTOLIC BLOOD PRESSURE: 84 MMHG | WEIGHT: 164.24 LBS | HEART RATE: 77 BPM | TEMPERATURE: 98 F | RESPIRATION RATE: 18 BRPM | SYSTOLIC BLOOD PRESSURE: 187 MMHG

## 2023-12-18 DIAGNOSIS — H40.9 ADVANCED STAGE GLAUCOMA: ICD-10-CM

## 2023-12-18 DIAGNOSIS — H25.12 CATARACTA BRUNESCENS OF LEFT EYE: ICD-10-CM

## 2023-12-18 DIAGNOSIS — Q13.81: ICD-10-CM

## 2023-12-18 DIAGNOSIS — H54.40: ICD-10-CM

## 2023-12-18 DIAGNOSIS — Z48.810 AFTERCARE FOLLOWING SURGERY OF A SENSE ORGAN: Primary | ICD-10-CM

## 2023-12-18 PROCEDURE — 250N000009 HC RX 250: Performed by: ANESTHESIOLOGY

## 2023-12-18 PROCEDURE — 66984 XCAPSL CTRC RMVL W/O ECP: CPT | Mod: LT | Performed by: OPHTHALMOLOGY

## 2023-12-18 PROCEDURE — 370N000017 HC ANESTHESIA TECHNICAL FEE, PER MIN: Performed by: OPHTHALMOLOGY

## 2023-12-18 PROCEDURE — C1783 OCULAR IMP, AQUEOUS DRAIN DE: HCPCS | Performed by: OPHTHALMOLOGY

## 2023-12-18 PROCEDURE — 250N000025 HC SEVOFLURANE, PER MIN: Performed by: OPHTHALMOLOGY

## 2023-12-18 PROCEDURE — 250N000013 HC RX MED GY IP 250 OP 250 PS 637: Performed by: ANESTHESIOLOGY

## 2023-12-18 PROCEDURE — 258N000003 HC RX IP 258 OP 636: Performed by: NURSE ANESTHETIST, CERTIFIED REGISTERED

## 2023-12-18 PROCEDURE — C1762 CONN TISS, HUMAN(INC FASCIA): HCPCS | Performed by: OPHTHALMOLOGY

## 2023-12-18 PROCEDURE — 710N000012 HC RECOVERY PHASE 2, PER MINUTE: Performed by: OPHTHALMOLOGY

## 2023-12-18 PROCEDURE — 999N000141 HC STATISTIC PRE-PROCEDURE NURSING ASSESSMENT: Performed by: OPHTHALMOLOGY

## 2023-12-18 PROCEDURE — 710N000010 HC RECOVERY PHASE 1, LEVEL 2, PER MIN: Performed by: OPHTHALMOLOGY

## 2023-12-18 PROCEDURE — 272N000001 HC OR GENERAL SUPPLY STERILE: Performed by: OPHTHALMOLOGY

## 2023-12-18 PROCEDURE — 250N000009 HC RX 250: Performed by: NURSE ANESTHETIST, CERTIFIED REGISTERED

## 2023-12-18 PROCEDURE — 250N000011 HC RX IP 250 OP 636: Performed by: ANESTHESIOLOGY

## 2023-12-18 PROCEDURE — 360N000077 HC SURGERY LEVEL 4, PER MIN: Performed by: OPHTHALMOLOGY

## 2023-12-18 PROCEDURE — 250N000011 HC RX IP 250 OP 636: Performed by: NURSE ANESTHETIST, CERTIFIED REGISTERED

## 2023-12-18 PROCEDURE — V2632 POST CHMBR INTRAOCULAR LENS: HCPCS | Performed by: OPHTHALMOLOGY

## 2023-12-18 PROCEDURE — 250N000011 HC RX IP 250 OP 636: Mod: JZ | Performed by: STUDENT IN AN ORGANIZED HEALTH CARE EDUCATION/TRAINING PROGRAM

## 2023-12-18 PROCEDURE — 250N000011 HC RX IP 250 OP 636: Performed by: OPHTHALMOLOGY

## 2023-12-18 PROCEDURE — 250N000009 HC RX 250: Performed by: OPHTHALMOLOGY

## 2023-12-18 PROCEDURE — L8610 OCULAR IMPLANT: HCPCS | Performed by: OPHTHALMOLOGY

## 2023-12-18 PROCEDURE — 66183 INSERT ANT DRAINAGE DEVICE: CPT | Mod: LT | Performed by: OPHTHALMOLOGY

## 2023-12-18 PROCEDURE — 250N000009 HC RX 250: Performed by: STUDENT IN AN ORGANIZED HEALTH CARE EDUCATION/TRAINING PROGRAM

## 2023-12-18 DEVICE — LENS CC60WF 25.5 CLAREON UV ASPHERIC BICONVEX IOL
Type: IMPLANTABLE DEVICE | Site: EYE | Status: NON-FUNCTIONAL
Removed: 2024-11-11

## 2023-12-18 DEVICE — EYE IMP OPTIGRAFT CORNEA 1/2 HALO HCO-HH1
Type: IMPLANTABLE DEVICE | Site: EYE | Status: NON-FUNCTIONAL
Removed: 2024-11-11

## 2023-12-18 DEVICE — EYE IMP VALVE AHMED FLEXIBLE FP7: Type: IMPLANTABLE DEVICE | Site: EYE | Status: FUNCTIONAL

## 2023-12-18 DEVICE — SINGLE-USE PRE-LOADED CAPSULAR TENSION RING
Type: IMPLANTABLE DEVICE | Site: EYE | Status: FUNCTIONAL
Brand: EYEJET® CTR

## 2023-12-18 RX ORDER — OFLOXACIN 3 MG/ML
1 SOLUTION/ DROPS OPHTHALMIC 4 TIMES DAILY
Qty: 5 ML | Refills: 0 | Status: SHIPPED | OUTPATIENT
Start: 2023-12-18 | End: 2024-04-23

## 2023-12-18 RX ORDER — NALOXONE HYDROCHLORIDE 0.4 MG/ML
0.4 INJECTION, SOLUTION INTRAMUSCULAR; INTRAVENOUS; SUBCUTANEOUS
Status: DISCONTINUED | OUTPATIENT
Start: 2023-12-18 | End: 2023-12-18 | Stop reason: HOSPADM

## 2023-12-18 RX ORDER — NALOXONE HYDROCHLORIDE 0.4 MG/ML
0.2 INJECTION, SOLUTION INTRAMUSCULAR; INTRAVENOUS; SUBCUTANEOUS
Status: DISCONTINUED | OUTPATIENT
Start: 2023-12-18 | End: 2023-12-18 | Stop reason: HOSPADM

## 2023-12-18 RX ORDER — PROPOFOL 10 MG/ML
INJECTION, EMULSION INTRAVENOUS PRN
Status: DISCONTINUED | OUTPATIENT
Start: 2023-12-18 | End: 2023-12-18

## 2023-12-18 RX ORDER — ONDANSETRON 4 MG/1
4 TABLET, ORALLY DISINTEGRATING ORAL EVERY 30 MIN PRN
Status: DISCONTINUED | OUTPATIENT
Start: 2023-12-18 | End: 2023-12-18 | Stop reason: HOSPADM

## 2023-12-18 RX ORDER — FENTANYL CITRATE 50 UG/ML
INJECTION, SOLUTION INTRAMUSCULAR; INTRAVENOUS PRN
Status: DISCONTINUED | OUTPATIENT
Start: 2023-12-18 | End: 2023-12-18

## 2023-12-18 RX ORDER — OXYCODONE HYDROCHLORIDE 5 MG/1
5 TABLET ORAL
Status: COMPLETED | OUTPATIENT
Start: 2023-12-18 | End: 2023-12-18

## 2023-12-18 RX ORDER — FENTANYL CITRATE 50 UG/ML
50 INJECTION, SOLUTION INTRAMUSCULAR; INTRAVENOUS EVERY 5 MIN PRN
Status: DISCONTINUED | OUTPATIENT
Start: 2023-12-18 | End: 2023-12-18 | Stop reason: HOSPADM

## 2023-12-18 RX ORDER — HYDROMORPHONE HYDROCHLORIDE 1 MG/ML
0.4 INJECTION, SOLUTION INTRAMUSCULAR; INTRAVENOUS; SUBCUTANEOUS EVERY 5 MIN PRN
Status: DISCONTINUED | OUTPATIENT
Start: 2023-12-18 | End: 2023-12-18 | Stop reason: HOSPADM

## 2023-12-18 RX ORDER — DIMENHYDRINATE 50 MG/ML
25 INJECTION, SOLUTION INTRAMUSCULAR; INTRAVENOUS
Status: DISCONTINUED | OUTPATIENT
Start: 2023-12-18 | End: 2023-12-18 | Stop reason: HOSPADM

## 2023-12-18 RX ORDER — PROPARACAINE HYDROCHLORIDE 5 MG/ML
1 SOLUTION/ DROPS OPHTHALMIC ONCE
Status: COMPLETED | OUTPATIENT
Start: 2023-12-18 | End: 2023-12-18

## 2023-12-18 RX ORDER — NEOMYCIN SULFATE, POLYMYXIN B SULFATE, AND DEXAMETHASONE 3.5; 10000; 1 MG/G; [USP'U]/G; MG/G
OINTMENT OPHTHALMIC PRN
Status: DISCONTINUED | OUTPATIENT
Start: 2023-12-18 | End: 2023-12-18 | Stop reason: HOSPADM

## 2023-12-18 RX ORDER — OXYCODONE HYDROCHLORIDE 10 MG/1
10 TABLET ORAL
Status: DISCONTINUED | OUTPATIENT
Start: 2023-12-18 | End: 2023-12-18 | Stop reason: HOSPADM

## 2023-12-18 RX ORDER — LABETALOL 20 MG/4 ML (5 MG/ML) INTRAVENOUS SYRINGE
PRN
Status: DISCONTINUED | OUTPATIENT
Start: 2023-12-18 | End: 2023-12-18

## 2023-12-18 RX ORDER — METOPROLOL TARTRATE 1 MG/ML
2.5 INJECTION, SOLUTION INTRAVENOUS EVERY 5 MIN PRN
Status: DISCONTINUED | OUTPATIENT
Start: 2023-12-18 | End: 2023-12-18 | Stop reason: HOSPADM

## 2023-12-18 RX ORDER — ONDANSETRON 2 MG/ML
4 INJECTION INTRAMUSCULAR; INTRAVENOUS EVERY 30 MIN PRN
Status: DISCONTINUED | OUTPATIENT
Start: 2023-12-18 | End: 2023-12-18 | Stop reason: HOSPADM

## 2023-12-18 RX ORDER — CYCLOPENTOLAT/TROPIC/PHENYLEPH 1%-1%-2.5%
1 DROPS (EA) OPHTHALMIC (EYE)
Status: COMPLETED | OUTPATIENT
Start: 2023-12-18 | End: 2023-12-18

## 2023-12-18 RX ORDER — LIDOCAINE HYDROCHLORIDE 20 MG/ML
INJECTION, SOLUTION INFILTRATION; PERINEURAL PRN
Status: DISCONTINUED | OUTPATIENT
Start: 2023-12-18 | End: 2023-12-18

## 2023-12-18 RX ORDER — BALANCED SALT SOLUTION 6.4; .75; .48; .3; 3.9; 1.7 MG/ML; MG/ML; MG/ML; MG/ML; MG/ML; MG/ML
SOLUTION OPHTHALMIC PRN
Status: DISCONTINUED | OUTPATIENT
Start: 2023-12-18 | End: 2023-12-18 | Stop reason: HOSPADM

## 2023-12-18 RX ORDER — EPHEDRINE SULFATE 50 MG/ML
INJECTION, SOLUTION INTRAMUSCULAR; INTRAVENOUS; SUBCUTANEOUS PRN
Status: DISCONTINUED | OUTPATIENT
Start: 2023-12-18 | End: 2023-12-18

## 2023-12-18 RX ORDER — HYDROMORPHONE HYDROCHLORIDE 1 MG/ML
0.2 INJECTION, SOLUTION INTRAMUSCULAR; INTRAVENOUS; SUBCUTANEOUS EVERY 5 MIN PRN
Status: DISCONTINUED | OUTPATIENT
Start: 2023-12-18 | End: 2023-12-18 | Stop reason: HOSPADM

## 2023-12-18 RX ORDER — FENTANYL CITRATE 50 UG/ML
25 INJECTION, SOLUTION INTRAMUSCULAR; INTRAVENOUS EVERY 5 MIN PRN
Status: DISCONTINUED | OUTPATIENT
Start: 2023-12-18 | End: 2023-12-18 | Stop reason: HOSPADM

## 2023-12-18 RX ORDER — HALOPERIDOL 5 MG/ML
1 INJECTION INTRAMUSCULAR
Status: DISCONTINUED | OUTPATIENT
Start: 2023-12-18 | End: 2023-12-18 | Stop reason: HOSPADM

## 2023-12-18 RX ORDER — SODIUM CHLORIDE, SODIUM LACTATE, POTASSIUM CHLORIDE, CALCIUM CHLORIDE 600; 310; 30; 20 MG/100ML; MG/100ML; MG/100ML; MG/100ML
INJECTION, SOLUTION INTRAVENOUS CONTINUOUS
Status: DISCONTINUED | OUTPATIENT
Start: 2023-12-18 | End: 2023-12-18 | Stop reason: HOSPADM

## 2023-12-18 RX ORDER — SODIUM CHLORIDE, SODIUM LACTATE, POTASSIUM CHLORIDE, CALCIUM CHLORIDE 600; 310; 30; 20 MG/100ML; MG/100ML; MG/100ML; MG/100ML
INJECTION, SOLUTION INTRAVENOUS CONTINUOUS PRN
Status: DISCONTINUED | OUTPATIENT
Start: 2023-12-18 | End: 2023-12-18

## 2023-12-18 RX ORDER — PREDNISOLONE ACETATE 10 MG/ML
1 SUSPENSION/ DROPS OPHTHALMIC 4 TIMES DAILY
Qty: 5 ML | Refills: 1 | Status: SHIPPED | OUTPATIENT
Start: 2023-12-18 | End: 2024-01-11

## 2023-12-18 RX ADMIN — DEXMEDETOMIDINE HYDROCHLORIDE 8 MCG: 100 INJECTION, SOLUTION INTRAVENOUS at 14:31

## 2023-12-18 RX ADMIN — PROPOFOL 30 MG: 10 INJECTION, EMULSION INTRAVENOUS at 12:31

## 2023-12-18 RX ADMIN — Medication 1 DROP: at 10:17

## 2023-12-18 RX ADMIN — PHENYLEPHRINE HYDROCHLORIDE 0.2 MCG/KG/MIN: 10 INJECTION INTRAVENOUS at 12:46

## 2023-12-18 RX ADMIN — SODIUM CHLORIDE, POTASSIUM CHLORIDE, SODIUM LACTATE AND CALCIUM CHLORIDE: 600; 310; 30; 20 INJECTION, SOLUTION INTRAVENOUS at 11:42

## 2023-12-18 RX ADMIN — Medication 5 MG: at 12:50

## 2023-12-18 RX ADMIN — METOPROLOL TARTRATE 2.5 MG: 5 INJECTION INTRAVENOUS at 10:41

## 2023-12-18 RX ADMIN — METOPROLOL TARTRATE 2.5 MG: 5 INJECTION INTRAVENOUS at 10:22

## 2023-12-18 RX ADMIN — PROPARACAINE HYDROCHLORIDE 1 DROP: 5 SOLUTION/ DROPS OPHTHALMIC at 10:06

## 2023-12-18 RX ADMIN — FENTANYL CITRATE 25 MCG: 50 INJECTION INTRAMUSCULAR; INTRAVENOUS at 15:11

## 2023-12-18 RX ADMIN — SUGAMMADEX 150 MG: 100 INJECTION, SOLUTION INTRAVENOUS at 14:35

## 2023-12-18 RX ADMIN — PROPOFOL 100 MG: 10 INJECTION, EMULSION INTRAVENOUS at 11:48

## 2023-12-18 RX ADMIN — Medication 20 MG: at 12:57

## 2023-12-18 RX ADMIN — Medication 5 MG: at 12:41

## 2023-12-18 RX ADMIN — Medication 1 DROP: at 10:07

## 2023-12-18 RX ADMIN — FENTANYL CITRATE 25 MCG: 50 INJECTION INTRAMUSCULAR; INTRAVENOUS at 12:14

## 2023-12-18 RX ADMIN — LABETALOL 20 MG/4 ML (5 MG/ML) INTRAVENOUS SYRINGE 5 MG: at 11:51

## 2023-12-18 RX ADMIN — Medication 30 MG: at 11:48

## 2023-12-18 RX ADMIN — Medication 1 DROP: at 10:11

## 2023-12-18 RX ADMIN — LIDOCAINE HYDROCHLORIDE 100 MG: 20 INJECTION, SOLUTION INFILTRATION; PERINEURAL at 11:48

## 2023-12-18 RX ADMIN — OXYCODONE HYDROCHLORIDE 5 MG: 5 TABLET ORAL at 16:38

## 2023-12-18 RX ADMIN — PROPOFOL 30 MG: 10 INJECTION, EMULSION INTRAVENOUS at 12:57

## 2023-12-18 RX ADMIN — ONDANSETRON 4 MG: 2 INJECTION INTRAMUSCULAR; INTRAVENOUS at 15:56

## 2023-12-18 RX ADMIN — PROPOFOL 20 MG: 10 INJECTION, EMULSION INTRAVENOUS at 13:47

## 2023-12-18 ASSESSMENT — ACTIVITIES OF DAILY LIVING (ADL)
ADLS_ACUITY_SCORE: 36
ADLS_ACUITY_SCORE: 36
ADLS_ACUITY_SCORE: 35
ADLS_ACUITY_SCORE: 36
ADLS_ACUITY_SCORE: 36

## 2023-12-18 NOTE — ANESTHESIA PREPROCEDURE EVALUATION
Anesthesia Pre-Procedure Evaluation    Patient: Bright Lockett   MRN: [id number] : 1960        Procedure : Procedure(s):  LEFT - EXTRACTION, CATARACT, EXTRACAPSULAR, WITH INTRAOCULAR LENS IMPLANT INSERTION  + POSSIBLE SCLERAL FIXATION INTRAOCULAR LENS IMPLANT  INSERTION, GLAUCOMA SHUNT IMPLANT, EYE - Left          Past Medical History:   Diagnosis Date     Aortic sclerosis      Back pain      Benign neoplasm of adenomatous polyp     of the large intestine     Carpal tunnel syndrome      Cerebral palsy (H)      Diverticulosis      Esophageal reflux      Glaucoma      Hyperlipidemia      Hypertension      Left ventricular hypertrophy      Leg weakness     left     Neuritis      Osteoarthritis of knee       Past Surgical History:   Procedure Laterality Date     [facility] EXPLORATORY OF ABDOMEN      Description: Exploratory Laparotomy;  Recorded: 2008;  Comments: small bowel obstruction      Allergies   Allergen Reactions     Morphine Nausea and Vomiting      Social History     Tobacco Use     Smoking status: Never     Passive exposure: Never     Smokeless tobacco: Never   Substance Use Topics     Alcohol use: No      Wt Readings from Last 1 Encounters:   23 74.5 kg (164 lb 3.9 oz)        Anesthesia Evaluation   Pt has had prior anesthetic.         ROS/MED HX  ENT/Pulmonary: Comment: Hx of Right Eye enucleation      Neurologic: Comment: Cerebral Palsy      Cardiovascular: Comment: Hx of Aortic Sclerosis.    (+)  hypertension- -   -  - -                                 Previous cardiac testing   Echo: Date: 2021 Results:  Narrative & Impression  1. Normal left ventricular size and systolic performance with a visually estimated ejection fraction of 60%.   2. There is mild to moderate concentric increase in left ventricular wall thickness.   3. There is mild aortic insufficiency.   4. Normal right ventricular size and systolic performance.   5. There is mild left atrial enlargement.     Stress Test:   Date: Results:    ECG Reviewed:  Date: 06/26/2019 Results:    Sinus tachycardia  Possible Left atrial enlargement  ST & T wave abnormality, consider inferior ischemia  ST & T wave abnormality, consider anterolateral ischemia  Abnormal ECG  When compared with ECG of 24-JUL-2013 12:53,  ST depression is less in anterolateral leads  T wave inversion less evident in Lateral leads      Cath:  Date: Results:      METS/Exercise Tolerance:     Hematologic:       Musculoskeletal: Comment: Osteoarthritis of left knee and chronic low back pain.  (+)  arthritis,             GI/Hepatic: Comment: Hx of Diverticulosis and colonic polyps    (+) GERD,                   Renal/Genitourinary: Comment: Stage 3 Chronic Renal Disease. BPH    (+) renal disease,  Pt does not require dialysis,           Endo:       Psychiatric/Substance Use:       Infectious Disease:       Malignancy:       Other:            Physical Exam    Airway        Mallampati: II   TM distance: > 3 FB   Neck ROM: full   Mouth opening: > 3 cm    Respiratory Devices and Support         Dental       (+) Minor Abnormalities - some fillings, tiny chips      Cardiovascular   cardiovascular exam normal          Pulmonary   pulmonary exam normal            Other findings: Labs (10/10/2023)          Hg      16.3        Plts     186        Na       141        K          4.2        Cr         1.35        A1C      5.6    Pt admits he has not taken any of this blood pressure medicines since Sat 11/16 because he misunderstood his preop directions.  BP on admission 218/104, 198/90. Asymptomatic, denies chest pain or SOB.  Given IV Metoprolol in preop with good response...'s-160's.  Will proceed to OR.  OUTSIDE LABS:  CBC:   Lab Results   Component Value Date    WBC 6.1 10/10/2023    WBC 7.1 07/04/2020    HGB 16.9 12/05/2023    HGB 16.3 10/10/2023    HCT 50.0 10/10/2023    HCT 49.6 07/04/2020     10/10/2023     07/04/2020     BMP:   Lab Results   Component Value  "Date     12/05/2023     10/10/2023    POTASSIUM 4.1 12/05/2023    POTASSIUM 4.2 10/10/2023    CHLORIDE 104 12/05/2023    CHLORIDE 105 10/10/2023    CO2 26 12/05/2023    CO2 28 10/10/2023    BUN 33.3 (H) 12/05/2023    BUN 23.4 (H) 10/10/2023    CR 0.98 12/05/2023    CR 1.35 (H) 10/10/2023     (H) 12/05/2023    GLC 82 10/10/2023     COAGS: No results found for: \"PTT\", \"INR\", \"FIBR\"  POC: No results found for: \"BGM\", \"HCG\", \"HCGS\"  HEPATIC:   Lab Results   Component Value Date    ALBUMIN 3.9 12/05/2023    PROTTOTAL 7.1 12/05/2023    ALT 8 12/05/2023    AST 16 12/05/2023    ALKPHOS 68 12/05/2023    BILITOTAL 0.8 12/05/2023     OTHER:   Lab Results   Component Value Date    A1C 5.6 10/10/2023    RAMY 9.2 12/05/2023    TSH 2.39 10/10/2023    CRP 2.3 (H) 07/04/2020    SED 5 07/04/2020       Anesthesia Plan    ASA Status:  3       Anesthesia Type: General.     - Airway: ETT   Induction: Propofol.   Maintenance: Balanced.   Techniques and Equipment:     - Airway: Video-Laryngoscope       Consents    Anesthesia Plan(s) and associated risks, benefits, and realistic alternatives discussed. Questions answered and patient/representative(s) expressed understanding.     - Discussed: Risks, Benefits and Alternatives for BOTH SEDATION and the PROCEDURE were discussed     - Discussed with:  Patient (Sister)      - Extended Intubation/Ventilatory Support Discussed: No.      - Patient is DNR/DNI Status: No     Use of blood products discussed: No .     Postoperative Care    Pain management: IV analgesics.   PONV prophylaxis: Ondansetron (or other 5HT-3), Dexamethasone or Solumedrol     Comments:           H&P reviewed: Unable to attach H&P to encounter due to EHR limitations. H&P Update: appropriate H&P reviewed, patient examined. No interval changes since H&P (within 30 days).      Magda Luna MD    I have reviewed the pertinent notes and labs in the chart from the past 30 days and (re)examined the patient.  Any " "updates or changes from those notes are reflected in this note.              # Overweight: Estimated body mass index is 26.92 kg/m  as calculated from the following:    Height as of this encounter: 1.664 m (5' 5.5\").    Weight as of this encounter: 74.5 kg (164 lb 3.9 oz).      "

## 2023-12-18 NOTE — ANESTHESIA CARE TRANSFER NOTE
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    Anesthesia Type:   General     Note:    Oropharynx: oropharynx clear of all foreign objects and spontaneously breathing  Level of Consciousness: drowsy  Oxygen Supplementation: face mask  Level of Supplemental Oxygen (L/min / FiO2): 8  Independent Airway: airway patency satisfactory and stable  Dentition: dentition unchanged  Vital Signs Stable: post-procedure vital signs reviewed and stable  Report to RN Given: handoff report given  Patient transferred to: PACU    Handoff Report: Identifed the Patient, Identified the Reponsible Provider, Reviewed the pertinent medical history, Discussed the surgical course, Reviewed Intra-OP anesthesia mangement and issues during anesthesia, Set expectations for post-procedure period and Allowed opportunity for questions and acknowledgement of understanding    Vitals:  Vitals Value Taken Time   /72 12/18/23 1448   Temp     Pulse 64 12/18/23 1455   Resp 0 12/18/23 1455   SpO2 96 % 12/18/23 1455   Vitals shown include unfiled device data.    Electronically Signed By: ALIE Marr CRNA  December 18, 2023  2:56 PM

## 2023-12-18 NOTE — ANESTHESIA PROCEDURE NOTES
Airway       Patient location during procedure: OR       Procedure Start/Stop Times: 12/18/2023 11:51 AM  Staff -        Anesthesiologist:  Magda Luna MD       CRNA: Zainab Brown APRN CRNA       Performed By: CRNA  Consent for Airway        Urgency: elective  Indications and Patient Condition       Indications for airway management: reilly-procedural       Induction type:intravenous       Mask difficulty assessment: 1 - vent by mask    Final Airway Details       Final airway type: endotracheal airway       Successful airway: ETT - single and Oral  Endotracheal Airway Details        ETT size (mm): 7.0       Cuffed: yes       Inital cuff pressure (cm H2O): 20       Successful intubation technique: video laryngoscopy       VL Blade Size: MAC 3       Grade View of Cords: 1       Adjucts: stylet       Position: Right       Measured from: lips       Secured at (cm): 23       Bite block used: None    Post intubation assessment        Placement verified by: capnometry, equal breath sounds and chest rise        Number of attempts at approach: 1       Number of other approaches attempted: 0       Secured with: tape       Ease of procedure: easy       Dentition: Intact and Unchanged    Medication(s) Administered   Medication Administration Time: 12/18/2023 11:51 AM

## 2023-12-18 NOTE — BRIEF OP NOTE
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]  One eye: total vision impairment; other eye: near-normal vision [H54.40]  Post-operative diagnosis Same as pre-operative diagnosis    Procedure: LEFT - EXTRACTION, CATARACT, EXTRACAPSULAR, WITH INTRAOCULAR LENS IMPLANT INSERTION, Left - Eye  INSERTION, GLAUCOMA SHUNT IMPLANT, EYE - Left, Left - Eye    Surgeon: Surgeon(s) and Role:  Panel 1:     * Dre Layton MD - Primary     * Emmett Gordon MD - Fellow - Assisting  Panel 2:     * Flori Hamilton MD - Primary     * Michell Carl MD - Resident - Assisting     * Basia Torres MD - Resident - Assisting  Anesthesia: General   Estimated Blood Loss: Minimal    Drains: None  Specimens: * No specimens in log *  Findings:   None.  Complications: None.  Implants:   Implant Name Type Inv. Item Serial No.  Lot No. LRB No. Used Action   EYE IMP VALVE AHMED FLEXIBLE FP7 - [id number] Lens/Eye Implant EYE IMP VALVE AHMED FLEXIBLE F [employer]  Left 1 Implanted   [employer] Other  [id number]  [id number] Left 1 Implanted   LENS [id number] 25.5 [employer] UV ASPHERIC BICONVEX IOL - [id number] Lens/Eye Implant LENS [id number] 25.5 CL[employer] UV ASPHERIC BICONVEX IOL [id number] [employer]  Left 1 Implanted   EYE IMP GRAFT CORNEA 1/2 HALO 1/2 THICK HCO-HH1 - [id number] Bone/Tissue/Biologic EYE IMP GRAFT CORNEA 1/2 HALO 1/2 THICK HCO-HH1 [id number]   Left 1 Implanted

## 2023-12-18 NOTE — OP NOTE
SURGEON: Dre Hager MD, PhD   ASSISTANT SURGEON: Emmett Gordon MD, MPH    PREOPERATIVE DIAGNOSIS: brunescent cataract, glaucoma, and Kisha's anomaly OS  POSTOPERATIVE DIAGNOSIS: same    Procedures (ALL OS):  Complex cataract surgery (Phacoemulsification, placement of CTR and PCIOL)  2.  iridoplasty  3. Tube Shunt placement    ANESTHESIA: General anesthesia   COMPLICATIONS: none    Indication: Bright Lockett is a 63 year old patient with diagnosis of visually significant cataract and glaucoma, here for cataract surgery and tube    DESCRIPTION OF THE PROCEDURE:  The patient was taken to the operative room where general anesthetic care was given.     The operative eye was prepped and draped in the usual sterile surgical fashion for ophthalmic surgery, including the installation of one drop of 5% Povidone Iodine.  A sterile drape was placed over the face and body and a lid speculum was inserted.      Dr. Hamilton started the procedure by suturing the shunt to the sclera. Please see her note for details.    Next, attention was turned to the cataract removal.    With the use of a Supersharp blade , two paracentesis were created at the limbus temporally and nasally. Vitrector was utilized to cut the iris strands and creat an opening centrally to be able to do the surgery.there was some hemorrhage from the iris vessels that stopped without interventions. At the end, there was only remnants of iris left at the periphery. Next trypan blue and air bubble were placed in AC and subsequently washed out with BSS.  Viscoelastic was injected into the anterior chamber using a canula.  A 2.4 mm keratome was then used to construct a clear corneal incision superiorly.  Using Utrata forceps and cystotome needle, a continuous curvilinear capsulorrhexis was created and hydrodissection was undertaken with the use of BSS.  The nucleus was found to be freely mobile and then removed by phacoemulsification using chop technique.  The  remaining elements of cortex were then removed with irrigation/aspiration. At this point, it was noted that there is an area of zonulysis from 3 to 6 o'clock. A CTR was placed into the bag. An IOL,was injected into the capsular bag and was rotated into a good position. The remaining elements of viscoelastic were then removed with bimanual irrigation/aspiration. The wounds were all sutured with one 10-0 nylon suture each, hydrated and were watertight.     A bedside 20D indirect exam was done. A superior hemiretinal BRVO was noted along with a minimally-elevated 3DD nevus with drusen and without overlying orange pigment along inferior arcade. No other mass lesion was noted. Prominent vortex vein was noted superotemporally.       Attention was turned to Tube shunt placement. Please see Dr. Hamilton's  note for details.     The lid speculum was removed. The eye was cleaned with wet and dry gauze. A clear shield were placed over the eye.  The patient was discharge in stable condition having tolerated the procedure well.    The surgery was assisted by Dr. Emmett Gordon, because no qualified resident was available on the day of the surgery. Due to the delicate and complex nature of this surgery, Dr. Gordon was required. Dr. Gordon assisted with the iridectomy and capsulorhexis. I was present for the entire surgery.    Dre Hager MD, PhD    Implant Name Type Inv. Item Serial No.  Lot No. LRB No. Used Action   EYE IMP VALVE AHMED FLEXIBLE FP7 - [id number] Lens/Eye Implant EYE IMP VALVE AHMED FLEXIBLE F [facility] WORLD IMPORTS  Left 1 Implanted   EyeJet CTR Other  [id number]  CBJAKB Left 1 Implanted   LENS CC60WF 25.5 CLAREON UV ASPHERIC BICONVEX IOL - [id number] Lens/Eye Implant LENS CC60WF 25.5 CLAREON UV ASPHERIC BICONVEX IOL [id number] [employer]  Left 1 Implanted

## 2023-12-18 NOTE — BRIEF OP NOTE
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]  One eye: total vision impairment; other eye: near-normal vision [H54.40]  Post-operative diagnosis Same as pre-operative diagnosis    Procedure: LEFT - EXTRACTION, CATARACT, EXTRACAPSULAR, WITH INTRAOCULAR LENS IMPLANT INSERTION, Left - Eye  INSERTION, GLAUCOMA SHUNT IMPLANT, EYE - Left, Left - Eye    Surgeon: Surgeon(s) and Role:  Panel 1:     * Dre Layton MD - Primary     * Emmett Gordon MD - Fellow - Assisting  Panel 2:     * Flori Hamilton MD - Primary     * Michell Carl MD - Resident - Assisting     * Basia Torres MD - Resident - Assisting  Anesthesia: General   Estimated Blood Loss: Minimal    Drains: None  Specimens: * No specimens in log *  Findings:   None.  Complications: None.  Implants:   Implant Name Type Inv. Item Serial No.  Lot No. LRB No. Used Action   EYE IMP VALVE AHMED FLEXIBLE FP7 - [id number] Lens/Eye Implant EYE IMP VALVE AHMED FLEXIBLE F NEW WORLD IMPORTS  Left 1 Implanted   EyeJet CTR Other  [id number]  CBJAKB Left 1 Implanted   LENS CC60WF 25.5 CLAREON UV ASPHERIC BICONVEX IOL - [id number] Lens/Eye Implant LENS CC60WF 25.5 CLAREON UV ASPHERIC BICONVEX IOL [id number] [employer]  Left 1 Implanted

## 2023-12-18 NOTE — DISCHARGE INSTRUCTIONS
"POST-OPERATIVE INSTRUCTIONS FOLLOWING SURGERY    Dre Hager MD, PhD  Department of Ophthalmology  [facility]  [phone]    FOLLOW UP:  You have a follow up appointment tomorrow at the Eye Clinic in the [facility] 9th floor.      EYE DROPS  Drops will be given to you after the surgery.  They DO NOT need to be used until after you are seen in clinic.  DO NOT disturb your bandage the first night.      When using more than one drop, separate them by 3 minutes between drops.  Common times to place drops are breakfast, lunch, dinner, and bedtime.  Do not stop your drops without discussing with our office.  If you run out before your appointment, call and we will send in a refill.    Ofloxacin (tan top) --- 4 times per day  Pred Forte (prednisolone acetate) --- 4 times per day    ACTIVITY:  No heavy lifting after surgery  Keep the bandage in place until you are seen tomorrow   Do not get your bandage wet      PAIN MEDICATION   It is common to have some mild or moderate discomfort after eye surgery.  Tylenol or ibuprofen may be taken if you don't have any other general health conditions that prevent you from taking these.      WHAT TO EXPECT  It is common for the eye to to have a blood tinged discharge for a few days after surgery  It may feel irritated (as if something were in your eye), for there to be clear discharge (thicker in the mornings upon awakening), and for it to be bloodshot for 2-3 weeks following retina surgery.     WHAT TO WATCH OUT FOR  If you experience any of the following \"RSVP Symptoms\", you should call immediately:  Worsening Redness  Worsening Sensitivity to light  Worsening Vision, including new flashing lights or floaters  Worsening Pain, including nausea/vomiting      For any of the symptoms listed above, or for other concerns, call [phone] and ask to speak to the clinic nurse.  If you call after hours, follow to prompts to reach the doctor on " call.                    Discharge Instructions Following Cataract/Glaucoma Surgery    Date: 12/18/2023    EYE MEDICATIONS:  You should start drops immediately when arrive home.      Vigamox - 1 drop 4 times a day to operative eye for 1 week   Prednisolone - 1 drop 4 times a day to operative eye  Put only ordered eye drops into the operative eye.  If you have glaucoma, continue your usual drops, unless directed otherwise.    For 5 days: Wear your glasses or leave the eye uncovered while awake.    Wear the eye shield every night and during daytime naps.  DO NOT use padding under eye shield.    You may notice blurred or double vision initially, this will gradually clear.     If you experience any severe pain, sudden decrease in vision, or discharge for the eye, contact your doctor immediately.     Minor itching, scratching, burning etc. is normal. Use regular or extra-strength Tylenol for discomfort.    Refrain from heavy lifting, excessive bending over, and heavy exertion for 1 week.    You may bathe or shower but do not get the eye wet.    Do not rub or push on the operative eye for 1 week.  You may wipe the lids gently with a warm wet cloth to remove matter from eyelashes.    Continue with your usual diet and medications prescribed by your doctor.    Sunglasses will increase your comfort post-operatively.    Post Operative Visit on 12/18/23  Bring all material and medications to the office on the first post-op day.  Call your surgeon at [phone] or the answering service for any problems, especially pain.      Same-Day Surgery   Adult Discharge Orders & Instructions     For 24 hours after surgery:  Get plenty of rest.  A responsible adult must stay with you for at least 24 hours after you leave the hospital.   Pain medication can slow your reflexes. Do not drive or use heavy equipment.  If you have weakness or tingling, don't drive or use heavy equipment until this feeling goes away.  Mixing alcohol and pain  medication can cause dizziness and slow your breathing. It can even be fatal. Do not drink alcohol while taking pain medication.  Avoid strenuous or risky activities.  Ask for help when climbing stairs.   You may feel lightheaded.  If so, sit for a few minutes before standing.  Have someone help you get up.   If you have nausea (feel sick to your stomach), drink only clear liquids such as apple juice, ginger ale, broth or 7-Up.  Rest may also help.  Be sure to drink enough fluids.  Move to a regular diet as you feel able. Take pain medications with a small amount of solid food, such as toast or crackers, to avoid nausea.   A slight fever is normal. Call the doctor if your fever is over 100 F (37.7 C) (taken under the tongue) or lasts longer than 24 hours.  You may have a dry mouth, muscle aches, trouble sleeping or a sore throat.  These symptoms should go away after 24 hours.  Do not make important or legal decisions.   Pain Management:      1. Take pain medication (if prescribed) for pain as directed by your physician.     Call your doctor for any of the followin.  Signs of infection (fever, growing tenderness at the surgery site, severe pain, a large amount of drainage or bleeding, foul-smelling drainage, redness, swelling).    2.  It has been over 8 to 10 hours since surgery and you are still not able to urinate (pee).    3.  Headache for over 24 hours.    To contact a doctor, call Dr. Yepez (Madan), Ophthalmology [phone]  or:  '   [phone] and ask for the Resident On Call for: Ophthalmology  (answered 24 hours a day)  '   Emergency Department:  [place] [facility]: [phone]  [place] [facility]: [phone]               Rev. 10/2014

## 2023-12-19 ENCOUNTER — OFFICE VISIT (OUTPATIENT)
Dept: OPHTHALMOLOGY | Facility: CLINIC | Age: 63
End: 2023-12-19
Attending: OPHTHALMOLOGY
Payer: MEDICARE

## 2023-12-19 DIAGNOSIS — Z48.810 AFTERCARE FOLLOWING SURGERY OF A SENSE ORGAN: Primary | ICD-10-CM

## 2023-12-19 DIAGNOSIS — Z98.890 POSTOPERATIVE EYE STATE: Primary | ICD-10-CM

## 2023-12-19 PROCEDURE — 999N000103 HC STATISTIC NO CHARGE FACILITY FEE

## 2023-12-19 PROCEDURE — G0463 HOSPITAL OUTPT CLINIC VISIT: HCPCS | Performed by: OPHTHALMOLOGY

## 2023-12-19 PROCEDURE — 99024 POSTOP FOLLOW-UP VISIT: CPT | Performed by: OPHTHALMOLOGY

## 2023-12-19 PROCEDURE — G0463 HOSPITAL OUTPT CLINIC VISIT: HCPCS | Mod: 27 | Performed by: OPHTHALMOLOGY

## 2023-12-19 ASSESSMENT — TONOMETRY
OS_IOP_MMHG: 12
IOP_METHOD: TONOPEN
OD_IOP_MMHG: X
OD_IOP_MMHG: X
IOP_METHOD: TONOPEN
OS_IOP_MMHG: 12

## 2023-12-19 ASSESSMENT — VISUAL ACUITY
OD_SC: PROS
OD_SC: PROS
OS_SC: HM
OS_SC: HM
METHOD: SNELLEN - LINEAR
METHOD: SNELLEN - LINEAR

## 2023-12-19 ASSESSMENT — SLIT LAMP EXAM - LIDS: COMMENTS: MILD EDEMA

## 2023-12-19 NOTE — ANESTHESIA POSTPROCEDURE EVALUATION
Patient: Bright Lockett    Procedure: Procedure(s):  LEFT - EXTRACTION, CATARACT, EXTRACAPSULAR, WITH INTRAOCULAR LENS IMPLANT INSERTION  INSERTION, GLAUCOMA SHUNT IMPLANT, EYE - Left       Anesthesia Type:  General    Note:  Disposition: Outpatient   Postop Pain Control: Uneventful            Sign Out: Well controlled pain   PONV: No   Neuro/Psych: Uneventful            Sign Out: Acceptable/Baseline neuro status   Airway/Respiratory: Uneventful            Sign Out: Acceptable/Baseline resp. status   CV/Hemodynamics: Uneventful            Sign Out: Acceptable CV status   Other NRE: NONE   DID A NON-ROUTINE EVENT OCCUR? No           Last vitals:  Vitals Value Taken Time   /72 12/18/23 1630   Temp 36.7  C (98.1  F) 12/18/23 1448   Pulse 74 12/18/23 1640   Resp 14 12/18/23 1640   SpO2 93 % 12/18/23 1640   Vitals shown include unfiled device data.    Electronically Signed By: Foster Renae MD  December 18, 2023  10:11 PM

## 2023-12-19 NOTE — PROGRESS NOTES
Postoperative day 1 status post left eye CE IOL (Madan) + Galucoma tube shunt (Sheheitli) 12/18/23    AC formed with mod inflammation (expectable)  Cornea edema 3+  In the bag IOL centered  Retina attached  Doing well    Plan:  No heavy lifting   Shield at night  Retina detachment and endophthalmitis precautions were discussed with the patient and was asked to return if any of the those occur    Medications to operative eye    Prednisolone (white or pink top) 6/day right eye   Ofloxacin (tan top) 4/day right eye     Follow-up next week      Complete documentation of historical and exam elements from today's encounter can be found in the full encounter summary report (not reduplicated in this progress note). I personally obtained the chief complaint(s) and history of present illness.  I confirmed and edited as necessary the review of systems, past medical/surgical history, family history, social history, and examination findings as documented by others; and I examined the patient myself. I personally reviewed the relevant tests, images, and reports as documented above. I formulated and edited as necessary the assessment and plan and discussed the findings and management plan with the patient and family.    Dre Hager MD  , Vitreoretinal surgery   Department of Ophthalmology  [facility]

## 2023-12-19 NOTE — NURSING NOTE
Chief Complaints and History of Present Illnesses   Patient presents with    Post Op (Ophthalmology) Left Eye     1 day s/p complex CE/IOL with shunt implant, left eye (Madan and Sheheitli)   Pt c/o a scratchy sensation but no pain.  Slept well.     eBata Trejo, COT 9:18 AM 12/19/2023          Chief Complaint(s) and History of Present Illness(es)       Post Op (Ophthalmology) Left Eye              Laterality: left eye    Onset: days ago    Comments: 1 day s/p complex CE/IOL with shunt implant, left eye (Madan and Sheheitli)   Pt c/o a scratchy sensation but no pain.  Slept well.     Beata Trejo, COT 9:18 AM 12/19/2023

## 2023-12-20 LAB
ATRIAL RATE - MUSE: 65 BPM
DIASTOLIC BLOOD PRESSURE - MUSE: NORMAL MMHG
INTERPRETATION ECG - MUSE: NORMAL
P AXIS - MUSE: 28 DEGREES
PR INTERVAL - MUSE: 144 MS
QRS DURATION - MUSE: 74 MS
QT - MUSE: 434 MS
QTC - MUSE: 451 MS
R AXIS - MUSE: 78 DEGREES
SYSTOLIC BLOOD PRESSURE - MUSE: NORMAL MMHG
T AXIS - MUSE: 144 DEGREES
VENTRICULAR RATE- MUSE: 65 BPM

## 2023-12-21 NOTE — OP NOTE
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 Hager  Iridoplasty left eye- Dr. Hager   INSERTION, GLAUCOMA SHUNT IMPLANT, EYE - Left - Dr. Hamilton     SURGEON:  Emmett Gordon MD- Fellow    Dre Layton MD- Primary Surgeon    Basia Torres MD- Assisting Resident    Michell Carl MD- Assisting Resident    Flori Hamilton MD- Primary Surgeon      ANESTHESIA: General Anesthesia     COMPLICATIONS:  none    FINDINGS:  Anatomy as expected    ESTIMATED BLOOD LOSS:   minimal    SPECIMENS:  * No specimens in log *    IMPLANTS:  Implant Name Type Inv. Item Serial No.  Lot No. LRB No. Used Action   EYE IMP VALVE AHMED FLEXIBLE FP7 - [id number] Lens/Eye Implant EYE IMP VALVE AHMED FLEXIBLE F [facility] IMPORTS  Left 1 Implanted   EYE RING MORCHER TENSION PRELOAD CC LT 12.3MM TYPE 14 LEFT - [id number] Lens/Eye Implant EYE RING MORCHER TENSION PRELOAD CC LT 12.3MM TYPE 14 LEFT [id number] [employer] CBJAKB Left 1 Implanted   LENS CC60WF 25.5 CLAREON UV ASPHERIC BICONVEX IOL - [id number] Lens/Eye Implant LENS CC60WF 25.5 CLAREON UV ASPHERIC BICONVEX IOL [id number] 034 [employer]  Left 1 Implanted   EYE IMP GRAFT CORNEA 1/2 HALO 1/2 THICK HCO-HH1 - [id number] Bone/Tissue/Biologic EYE IMP GRAFT CORNEA 1/2 HALO 1/2 THICK HCO-HH1 [id number]   Left 1 Implanted      PROCEDURE:  Betadine paint and drop in holding room  Prep and drape in usual manner in OR  Time out according to protocol   Superior traction suture 7-0 vicryl   Superotemporal fornix-based conjunctival flap.  Bleeding controlled with cautery  Adequate pocket created using blunt and sharp dissection.   Ahmed primed with  BSS  Implant sutured onto the globe using 8-0 nylon sutures with the knot rotated into the eyelets of the implant.  The cataract surgery was then performed with Dr. Hager, refer to procedure note by retina team.  After completion of the cataract surgery attention was then shifted to the tube surgery again   Tube trimmed to appropriate length with bevel away up  23 gauge needle track made into the anterior chamber   Tube threaded into the eye  Tube secured to globe with a 7-0 vicryl suture  Corneal patch graft placed over the anterior portion of the tube and secured with 7-0 vicryl  Conjunctiva closed with 7-0 sutures  Healon injected into the anterior chamber  Subconjunctival injection of antibiotic and steroid  Removal of traction suture  Topical atropine drop   Topical antibiotic and steroid ointment   Dry sterile dressing    The patient tolerated the procedure well. There were no unexpected findings or complications.

## 2023-12-26 ENCOUNTER — OFFICE VISIT (OUTPATIENT)
Dept: OPHTHALMOLOGY | Facility: CLINIC | Age: 63
End: 2023-12-26
Attending: OPHTHALMOLOGY
Payer: MEDICARE

## 2023-12-26 DIAGNOSIS — Z48.810 AFTERCARE FOLLOWING SURGERY OF A SENSE ORGAN: Primary | ICD-10-CM

## 2023-12-26 DIAGNOSIS — H21.02 HYPHEMA OF LEFT EYE: ICD-10-CM

## 2023-12-26 DIAGNOSIS — Q13.81 AXENFELD-RIEGER SYNDROME: ICD-10-CM

## 2023-12-26 PROCEDURE — 99024 POSTOP FOLLOW-UP VISIT: CPT | Mod: GC | Performed by: OPHTHALMOLOGY

## 2023-12-26 PROCEDURE — G0463 HOSPITAL OUTPT CLINIC VISIT: HCPCS | Performed by: OPHTHALMOLOGY

## 2023-12-26 PROCEDURE — 76512 OPH US DX B-SCAN: CPT | Performed by: OPHTHALMOLOGY

## 2023-12-26 PROCEDURE — 99207 ULTRASOUND B-SCAN OS (LEFT EYE): CPT | Mod: 26 | Performed by: OPHTHALMOLOGY

## 2023-12-26 ASSESSMENT — VISUAL ACUITY
METHOD: SNELLEN - LINEAR
OD_SC: PROS
OS_SC: HM

## 2023-12-26 ASSESSMENT — TONOMETRY
IOP_METHOD: ICARE
OS_IOP_MMHG: 20
OD_IOP_MMHG: X

## 2023-12-26 ASSESSMENT — SLIT LAMP EXAM - LIDS: COMMENTS: MILD EDEMA

## 2023-12-26 NOTE — PROGRESS NOTES
Postoperative week 1 status post left eye CE IOL (Madan) + Glaucoma ahmed tube shunt (Alfonso) 12/18/23    Eye is comfortable  AC formed with hyphema (fresh hyphema; not sure where the origin is; will observe with conservative treatment)  Cornea edema 3+ D folds  IOP 20 today  In the bag IOL but possibly inf edge tilted forward and touching/close to cornea  Retina attached  B scan done today, no choroidals  Doing well; I think he will recover a reasonable vision after hyphema and corneal edema resolves; will do OCT and optos FA to determine ischemia secondary to HRVO left eye and treat    Plan:  No heavy lifting   Shield at night  Retina detachment and endophthalmitis precautions were discussed with the patient and was asked to return if any of the those occur    Medications to operative eye    Decrease Prednisolone (white or pink top) 4/day right eye  Continue ofloxacin (tan top) daily    Follow-up  as scheduled 1/16/24  See Dr Hamilton in 7-10 days    Michell Carl MD  Resident Physician, PGY-3  Department of Ophthalmology      Complete documentation of historical and exam elements from today's encounter can be found in the full encounter summary report (not reduplicated in this progress note). I personally obtained the chief complaint(s) and history of present illness.  I confirmed and edited as necessary the review of systems, past medical/surgical history, family history, social history, and examination findings as documented by others; and I examined the patient myself. I personally reviewed the relevant tests, images, and reports as documented above. I formulated and edited as necessary the assessment and plan and discussed the findings and management plan with the patient and family.    Dre Hager MD  , Vitreoretinal surgery   Department of Ophthalmology  [facility]

## 2023-12-26 NOTE — NURSING NOTE
Chief Complaints and History of Present Illnesses   Patient presents with    Post Op (Ophthalmology) Left Eye     POW1 s/p CE/IOL left eye 12/18/23     Chief Complaint(s) and History of Present Illness(es)       Post Op (Ophthalmology) Left Eye              Laterality: left eye    Associated symptoms: itching.  Negative for eye pain, flashes and floaters    Treatments tried: eye drops    Pain scale: 0/10    Comments: POW1 s/p CE/IOL left eye 12/18/23              Comments    Pt states his vision is worse than before the procedure.   He tells me he can see some light sources.   No eye pain, occasional scratchy irritation.   Pt states compliant with eyedrops.     Austin Lawrence 9:07 AM December 26, 2023

## 2023-12-26 NOTE — PATIENT INSTRUCTIONS
Decrease Prednisolone (white or pink top) 4/day left eye    Continue ofloxacin (tan top) daily - ok to stop if it runs out

## 2023-12-27 ASSESSMENT — SLIT LAMP EXAM - LIDS: COMMENTS: MILD EDEMA

## 2023-12-27 NOTE — PROGRESS NOTES
Chief Complaint/Presenting Concern: postop day 1     History of Present Illness:   Bright Lockett is a 63 year old patient who presents for post op. Per chart note from [facility] on 07/07/23, he previously was followed by Dr. Ritter, but had not been on drops for years at that time. At that visit, his eye pressure was in the 30s. He has a history of glaucoma, Reiger's anomaly, and a cataract in the left eye.     Today POD1 s/p CE/IOL with Dr. Hager and JOYCE with Dr. Hamilton, doing well. Minimal eye pain.     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 Dillon); Per patient, has had at least two surgeries in the left eye  Maximum intraocular pressure x/35 mmHg  Currently Meds: Timolol twice daily in left eye (previously at up to three eye drops)  Family history: positive: Possibly mother  CCT (um): 11/14/2023: x/555  Gonio: 11/14/2023:   Trauma history: negative  Steroid exposure: negative  Vasospastic disease: Migrane/Raynaud phenomenon: positive, Migraines  A past hemodynamic crisis or Low BP:: positive: History of GI surgery to remove blockage, patient has to be resuscitated on table  Meds AEs/intolerance: None  PMHx: Heart failure, Chronic kidney disease  Anticoagulants: None  Previous testing:  Visual  field November 14, 2023: Unable to obtain   OCT Optic Nerve RNFL Spectralis November 14, 2023  right eye: X  left eye: Unable to obtain adequate image    Additional Ocular History:   1. Enucleation, right eye (~1975)    2. Kisha's anomaly, left eye    3. Nuclear sclerosis, left eye  Bruenscent   Reports impacts ADL   No Fuchs Dystrophy  Candidate for multifocal: NO  Candidate for toric IOL: NO  Anticoagulants: NO    Plan/Recommendations:  ? Discussed findings with patient.  ? 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 Recommended tube shunt surgery.  ? Today POD1 s/p CE/IOL/JOYCE, doing well.   ? Start Prednisolone 6 times daily   ? Start Vigamox 4 times daily   ? Start Ketorolac 4 times daily   ? Hold off all glaucoma drops.   ? Postop Precautions and instructions given to the patient.     RTC in 1 week [facility], IOP       Physician Attestation     Attending Physician Attestation:  Complete documentation of historical and exam elements from today's encounter can be found in the full encounter summary report (not reduplicated in this progress note). I personally obtained the chief complaint(s) and history of present illness. I confirmed and edited as necessary the review of systems, past medical/surgical history, family history, social history, and examination findings as documented by others; and I examined the patient myself. I personally reviewed the relevant tests, images, and reports as documented above. I formulated and edited as necessary the assessment and plan and discussed the findings and management plan with the patient and any family members present at the time of the visit.  Flori Hamilton M.D., Glaucoma, 12/19/23

## 2023-12-28 ENCOUNTER — TELEPHONE (OUTPATIENT)
Dept: OPHTHALMOLOGY | Facility: CLINIC | Age: 63
End: 2023-12-28
Payer: MEDICARE

## 2023-12-28 NOTE — TELEPHONE ENCOUNTER
"LM on VM for patient's sister, Leeanne.     Per Dr. Hamilton, \"He needs to see ME on the one month for the shunt. If the patient doesn't want to return for follow up he will need to understand the risks. I can only see him on my clinic days.     Please relay the message that I am happy to see him and add him on to a clinic day if they missed to schedule a 1 month follow up with me. He is monocular and needs to understand that he could lose vision if doesn't return on the needed postop visits.\"     Relayed the information to patient's sister. Asked her to call me back to schedule with Dr. Hamilton either on 1/9, 1/11, 1/23, 1/25, or 1/26. Gave my direct callback number.     Fay Sifuentes, ALEK 5:15 PM 12/28/2023    "

## 2024-01-02 NOTE — TELEPHONE ENCOUNTER
Called and spoke with patient's sister, Leeanne. Scheduled Peter for 1/11 at 12:30pm with Dr. Hamilton.     Fay Sifuentes, COT 9:12 AM 01/02/2024

## 2024-01-03 ENCOUNTER — VIRTUAL VISIT (OUTPATIENT)
Dept: FAMILY MEDICINE | Facility: CLINIC | Age: 64
End: 2024-01-03
Payer: MEDICARE

## 2024-01-03 DIAGNOSIS — Z12.11 SCREEN FOR COLON CANCER: Primary | ICD-10-CM

## 2024-01-03 DIAGNOSIS — I10 ESSENTIAL HYPERTENSION: Chronic | ICD-10-CM

## 2024-01-03 DIAGNOSIS — G80.9 INFANTILE CEREBRAL PALSY (H): ICD-10-CM

## 2024-01-03 DIAGNOSIS — N18.31 STAGE 3A CHRONIC KIDNEY DISEASE (H): ICD-10-CM

## 2024-01-03 DIAGNOSIS — I50.32 CHRONIC HEART FAILURE WITH PRESERVED EJECTION FRACTION (H): ICD-10-CM

## 2024-01-03 PROCEDURE — 99442 PR PHYSICIAN TELEPHONE EVALUATION 11-20 MIN: CPT | Mod: 93 | Performed by: FAMILY MEDICINE

## 2024-01-03 RX ORDER — CLONIDINE HYDROCHLORIDE 0.1 MG/1
0.2 TABLET ORAL 2 TIMES DAILY
Start: 2024-01-03 | End: 2024-04-23

## 2024-01-03 NOTE — PROGRESS NOTES
Bright is a 63 year old who is being evaluated via a billable telephone visit.      What phone number would you like to be contacted at? [phone]  How would you like to obtain your AVS? Dotty    Distant Location (provider location):  On-site        Subjective   Bright is a 63 year old, presenting for the following health issues:  Follow Up and Blood Pressure Check        1/3/2024     4:54 PM   Additional Questions   Roomed by Dorota AGUSTIN       History of Present Illness       Reason for visit:  Follow up blood pressure                    Objective    Vitals - Patient Reported  Systolic (Patient Reported): (!) 185  Diastolic (Patient Reported): 87    Additional blood pressure is 188/93, 176/89, 145/78.  Confirms he is taking clonidine 0.1 twice daily, in addition to furosemide 20, losartan 100, metoprolol  mg daily and potassium.        Chronic heart failure with preserved ejection fraction (H)  No symptoms of CHF    3. Infantile cerebral palsy (H)  Stable, unchanged, prefers home visits if possible rather than clinic visits    4. Stage 3a chronic kidney disease (H)  Recent BMP  With proteinuria, on ARB  5. Essential hypertension  Still out-of-control, increase to 0.2 mg twice daily, phone visit 2 weeks.  Patient still needs in person visit to get aldosterone renin ratio completed.  Will plan this in the near future.  - cloNIDine (CATAPRES) 0.1 MG tablet; Take 2 tablets (0.2 mg) by mouth 2 times daily    Phone call duration: 12 minutes

## 2024-01-04 DIAGNOSIS — H21.02 HYPHEMA OF LEFT EYE: Primary | ICD-10-CM

## 2024-01-11 ENCOUNTER — OFFICE VISIT (OUTPATIENT)
Dept: OPHTHALMOLOGY | Facility: CLINIC | Age: 64
End: 2024-01-11
Attending: OPHTHALMOLOGY
Payer: MEDICARE

## 2024-01-11 DIAGNOSIS — Z98.890 POSTOPERATIVE EYE STATE: Primary | ICD-10-CM

## 2024-01-11 DIAGNOSIS — Z48.810 AFTERCARE FOLLOWING SURGERY OF A SENSE ORGAN: ICD-10-CM

## 2024-01-11 DIAGNOSIS — H40.52X3 GLAUCOMA SECONDARY TO OTHER EYE DISORDERS, LEFT EYE, SEVERE STAGE: ICD-10-CM

## 2024-01-11 DIAGNOSIS — H40.52X3 GLAUCOMA ASSOCIATED WITH OCULAR DISORDER, LEFT, SEVERE STAGE: ICD-10-CM

## 2024-01-11 DIAGNOSIS — Q13.81 AXENFELD-RIEGER SYNDROME: ICD-10-CM

## 2024-01-11 PROCEDURE — G0463 HOSPITAL OUTPT CLINIC VISIT: HCPCS | Performed by: OPHTHALMOLOGY

## 2024-01-11 PROCEDURE — 99024 POSTOP FOLLOW-UP VISIT: CPT | Mod: GC | Performed by: OPHTHALMOLOGY

## 2024-01-11 RX ORDER — PREDNISOLONE ACETATE 10 MG/ML
1 SUSPENSION/ DROPS OPHTHALMIC
Qty: 15 ML | Refills: 3 | Status: SHIPPED | OUTPATIENT
Start: 2024-01-11 | End: 2024-05-28

## 2024-01-11 RX ORDER — TIMOLOL MALEATE 5 MG/ML
1 SOLUTION/ DROPS OPHTHALMIC 2 TIMES DAILY
Qty: 10 ML | Refills: 11 | Status: SHIPPED | OUTPATIENT
Start: 2024-01-11 | End: 2024-04-23

## 2024-01-11 ASSESSMENT — VISUAL ACUITY
OD_SC: PROS
OS_SC: HM
METHOD: SNELLEN - LINEAR

## 2024-01-11 ASSESSMENT — TONOMETRY
IOP_METHOD: TONOPEN
IOP_METHOD: TONOPEN
OS_IOP_MMHG: 21
OS_IOP_MMHG: 27
OS_IOP_MMHG: 21
IOP_METHOD: TONOPEN
IOP_METHOD: TONOPEN
OS_IOP_MMHG: 19
OD_IOP_MMHG: PROS

## 2024-01-11 ASSESSMENT — REFRACTION_MANIFEST
OD_SPHERE: BALANCE
OS_SPHERE: PLANO
OS_CYLINDER: SPHERE

## 2024-01-11 ASSESSMENT — SLIT LAMP EXAM - LIDS
COMMENTS: MILD EDEMA
COMMENTS: NORMAL

## 2024-01-11 NOTE — PROGRESS NOTES
Chief Complaint/Presenting Concern: Post operative visit     History of Present Illness:   Bright Lockett is a 63 year old patient who presents for post op. Per chart note from [facility] on 07/07/23, he previously was followed by Dr. Ritter, but had not been on drops for years at that time. At that visit, his eye pressure was in the 30s. He has a history of glaucoma, Reiger's anomaly, and a cataract in the left eye.     Today POW4 s/p CE/IOL with Dr. Hager and JOYCE with Dr. Hamilton 12/18.23, doing well. Minimal eye pain.     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 Dillon); Per patient, has had at least two surgeries in the left eye  Maximum intraocular pressure x/35 mmHg  Currently Meds: Timolol twice daily in left eye (previously at up to three eye drops)  Family history: positive: Possibly mother  CCT (um): 11/14/2023: x/555  Gonio: 11/14/2023:   Trauma history: negative  Steroid exposure: negative  Vasospastic disease: Migrane/Raynaud phenomenon: positive, Migraines  A past hemodynamic crisis or Low BP:: positive: History of GI surgery to remove blockage, patient has to be resuscitated on table  Meds AEs/intolerance: None  PMHx: Heart failure, Chronic kidney disease  Anticoagulants: None  Previous  testing:  Visual field November 14, 2023: Unable to obtain   OCT Optic Nerve RNFL Spectralis November 14, 2023  right eye: X  left eye: Unable to obtain adequate image    Additional Ocular History:   Enucleation, right eye (~1975)    Kisha's anomaly, left eye    Nuclear sclerosis, left eye  Bruenscent   Reports impacts ADL   No Fuchs Dystrophy  Candidate for multifocal: NO  Candidate for toric IOL: NO  Anticoagulants: NO    Plan/Recommendations:  Discussed findings with patient.  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   Underwent CEIOL/JOYCE on 12/18/23 with post-op hyphema. IOP above goal today, likely due to Ahmed hypertensive phase. Will restart Timolol.  Inferior haptic appears displaced into the AC with significant amount of inflammation and blood in the AC. Patient following up with Dr. Hager on 1/16.  Increase Prednisolone to 6x/day left eye  Stop ofloxacin  Restart Timolol BID left eye  Postop precautions and instructions given to the patient.     RTC in 4 weeks VA, IOP     Kiera Torres MD  PGY3 Ophthalmology Resident  [facility]      Physician Attestation     Attending Physician Attestation:  Complete documentation of historical and exam elements from today's encounter can be found in the full encounter summary report (not reduplicated in this progress note). I personally obtained the chief complaint(s) and history of present illness. I confirmed and edited as necessary the review of systems, past medical/surgical history, family history, social history, and examination findings as documented by others; and I examined the patient myself. I personally reviewed the relevant tests, images, and reports as documented above. I formulated and edited as necessary the assessment and plan and discussed the findings and management plan with the patient and any family members present at the time of the visit.  Flori Hamilton M.D., Glaucoma, January 11, 2024

## 2024-01-11 NOTE — PATIENT INSTRUCTIONS
Increase Prednisolone (white or pink top) to 6 times a day left eye    Start Timolol (yellow top) 2 times a day    Stop Ofloxacin

## 2024-01-11 NOTE — NURSING NOTE
Chief Complaints and History of Present Illnesses   Patient presents with    Post Op (Ophthalmology) Left Eye     Chief Complaint(s) and History of Present Illness(es)       Post Op (Ophthalmology) Left Eye              Laterality: left eye    Course: gradually improving    Associated symptoms: Negative for eye pain, flashes and floaters              Comments    Here for post op left eye. VA has slightly improved. No eye pain. No new floaters or flashes. Uses drops as instructed.    Robbie Fletcher COT 12:33 PM January 11, 2024

## 2024-01-16 ENCOUNTER — OFFICE VISIT (OUTPATIENT)
Dept: OPHTHALMOLOGY | Facility: CLINIC | Age: 64
End: 2024-01-16
Attending: OPHTHALMOLOGY
Payer: MEDICARE

## 2024-01-16 DIAGNOSIS — Z98.890 POSTOPERATIVE EYE STATE: Primary | ICD-10-CM

## 2024-01-16 PROCEDURE — 99024 POSTOP FOLLOW-UP VISIT: CPT | Mod: GC | Performed by: OPHTHALMOLOGY

## 2024-01-16 PROCEDURE — G0463 HOSPITAL OUTPT CLINIC VISIT: HCPCS | Performed by: OPHTHALMOLOGY

## 2024-01-16 ASSESSMENT — VISUAL ACUITY
OD_SC: PROS
OS_SC: HM
METHOD: SNELLEN - LINEAR

## 2024-01-16 ASSESSMENT — SLIT LAMP EXAM - LIDS
COMMENTS: MILD EDEMA
COMMENTS: NORMAL

## 2024-01-16 ASSESSMENT — TONOMETRY
OS_IOP_MMHG: 16
OD_IOP_MMHG: PROS
IOP_METHOD: TONOPEN

## 2024-01-16 NOTE — NURSING NOTE
"Chief Complaints and History of Present Illnesses   Patient presents with    Post Op (Ophthalmology) Left Eye     LEFT - Phacoemulsificaiton  CATARACT,  WITH INTRAOCULAR LENS IMPLANT INSERTION, CTR, iridectomy 12/18/23.     Chief Complaint(s) and History of Present Illness(es)       Post Op (Ophthalmology) Left Eye              Laterality: left eye    Onset: sudden    Onset: 1 month ago    Comments: LEFT - Phacoemulsificaiton  CATARACT,  WITH INTRAOCULAR LENS IMPLANT INSERTION, CTR, iridectomy 12/18/23.              Comments    No change to vision LE.  No pain.  \"Cobweb\" effect is gone but having trouble focusing.  Pt is compliant with drops.    ALEK Erickson January 16, 2024 9:46 AM                       "

## 2024-01-16 NOTE — PROGRESS NOTES
Postoperative month 1 status post left eye CE IOL - zonulysis was present, CTR was placed (Madan) + Glaucoma ahmed tube shunt (Alfonso) 12/18/23    Eye is comfortable without pain; very little improvement in vision if any  IOP 16 today  Inferior IOL haptic appears appears pushed forward out of the capsular bag (or pulled forward) into AC with overlying heme and fibrin    Drops:  Prednisolone to 6x/day left eye  Timolol BID left eye    Plan:  CTR is in the bag but IOL haptic and part of the optic has been pushed (or pulled by fibrin) forward; hyphema is resolving slowly and I would like to wait for another 2-3 weeks so if IOL is being pulled because of AC fibrin reaction, it will appear better with waiting. If IOL stays subluxated to AC then we will decide to do IOL repositioning and AC washout. The IOL is not touching the cornea so there is no urgency in repositioning the lens. I am hesitant to recommend surgery at this point because there is a small chance for spontaneous improvement and I want to minimize AC manipulation to preserve cornea cells as much as possible     Medications to operative eye:   Continue Prednisolone (white or pink top) 6/day left eye per Dr. Hamilton  Continue timolol 2x/day left eye per Dr. Hamilton    Follow-up 2-3 weeks, will try to schedule same day as Dr Hamilton   Scheduled to see Dr Hamilton 2/9/24    Michell Carl MD  Resident Physician, PGY-3  Department of Ophthalmology      Complete documentation of historical and exam elements from today's encounter can be found in the full encounter summary report (not reduplicated in this progress note). I personally obtained the chief complaint(s) and history of present illness.  I confirmed and edited as necessary the review of systems, past medical/surgical history, family history, social history, and examination findings as documented by others; and I examined the patient myself. I personally reviewed the relevant tests, images, and  reports as documented above. I formulated and edited as necessary the assessment and plan and discussed the findings and management plan with the patient and family.    Dre Hager MD  , Vitreoretinal surgery   Department of Ophthalmology  [facility]

## 2024-01-17 ENCOUNTER — VIRTUAL VISIT (OUTPATIENT)
Dept: FAMILY MEDICINE | Facility: CLINIC | Age: 64
End: 2024-01-17
Payer: MEDICARE

## 2024-01-17 DIAGNOSIS — I10 ESSENTIAL HYPERTENSION: Chronic | ICD-10-CM

## 2024-01-17 DIAGNOSIS — Z12.11 SCREEN FOR COLON CANCER: Primary | ICD-10-CM

## 2024-01-17 PROCEDURE — G2012 BRIEF CHECK IN BY MD/QHP: HCPCS | Mod: 93 | Performed by: FAMILY MEDICINE

## 2024-01-17 RX ORDER — CLONIDINE HYDROCHLORIDE 0.3 MG/1
0.3 TABLET ORAL 2 TIMES DAILY
Qty: 180 TABLET | Refills: 3 | Status: SHIPPED | OUTPATIENT
Start: 2024-01-17 | End: 2024-04-23

## 2024-01-17 NOTE — LETTER
"My Heart Failure Action Plan  Name: Bright Lockett   YOB: 1960  Date: 1/17/2024   My doctor:   Ladarius Soria [facility] [address] [place] [id number] [phone] My Diagnosis: {HF TYPE:963327}  My Ejection Fraction: No results found for: \"LVEF\"  {EF% (Optional):843485}  My Exercise Goal: Start exercise slowly - to begin, do a few minutes of exercise, several times a day. Increase your time and speed [id number] to build tolerance, with a goal of 30 minutes of exercise daily. Steady, slow, and consistent exercise is both safe and healthy. Stop and rest when you feel tired or become short of breath. Do not push yourself on days when you don t feel well.       My Weight Plan:   Wt Readings from Last 2 Encounters:   12/18/23 74.5 kg (164 lb 3.9 oz)   12/05/23 76 kg (167 lb 8 oz)     Weigh yourself daily using the same scale. If you gain more than 2 pounds in 24 hours or 5 pounds in 7 days. {:992543}    My Diet Goal: { :500219::\"No added salt\"}    Emergency Room Visits:    Our goal is to improve your quality of life and help you avoid a visit to the emergency room or hospital.  If we work together, we can achieve this goal. But, if you feel you need to call 911 or go to the emergency room, please do so.  If you go to the emergency room, please bring your list of medicines and your daily weight chart with you.    Each day ask yourself:  Is my weight up?  Do I have swelling?  Do I have trouble breathing?  How did I sleep?  Other problems?       GREEN ZONE     Weight gained is no more than 2 pounds a day or 5 pounds a in 7 days.  No swelling in feet, ankles, legs or stomach.  No more swelling than usual.  No more trouble breathing than usual.  No change in my sleep.  No other problems. What should I do?  I am doing fine. I will take my medicine, follow my diet, see my doctor, exercise, and watch for symptoms.           YELLOW ZONE         Weight gain of more " than 2 pounds in one day or 5 pounds in 7 days.  New swelling in ankle, leg, knee or thigh.  Bloating in belly, pants feel tighter.  Swelling in hands or face.  Coughing or trouble breathing while walking or talking.  Harder to breathe last night.  Have trouble sleeping, wake up short of breath.  Unusually tired.  Not eating.  Nausea, vomiting, or diarrhea  Pain in my chest or bad  leg cramps.  Feel weak or dizzy. What should I do?  I need to take action and call my doctor or nurse today.                 RED ZONE         Weight gain of 5 pounds overnight.  Chest pain or pressure that does not go away.  Feel less alert.  Wheezing or have trouble breathing when at rest.  Cannot sleep lying down.  Cannot take my medicines.  Pass out or faint. What should I do?  I need to call my doctor or nurse now!  Call 911 if I have chest pain or cannot breathe.

## 2024-01-17 NOTE — PROGRESS NOTES
Bright is a 64 year old who is being evaluated via a billable telephone visit.      What phone number would you like to be contacted at? [phone]  How would you like to obtain your AVS? Dotty  64-year-old,  History of hypertension, resistant,   History of LVH and congestive heart failure as well as chronic kidney disease stage III.  Proteinuria.    Has not had lab assessment of renal and aldosterone ratio  Intolerant of same channel blockers due to edema    Currently taking losartan 100, metoprolol 200, furosemide 20 daily and clonidine 0.2 twice daily.  Blood pressure is labile at home, some are good,  Most recent 169/75 is about average    Will increase clonidine  Could consider thiazide diuretic/spironolactone  Follow-up by phone 2 weeks  In person visit within 4-6, check lab      Phone call duration: 9 minutes  Signed Electronically by: Ladarius Soria MD    Answers submitted by the patient for this visit:  Hypertension Visit (Submitted on 1/17/2024)  Chief Complaint: Chronic problems general questions HPI Form  Do you check your blood pressure regularly outside of the clinic?: Yes  Are your blood pressures ever more than 140 on the top number (systolic) OR more than 90 on the bottom number (diastolic)? (For example, greater than 140/90): Yes  Are you following a low salt diet?: Yes  General Questionnaire (Submitted on 1/17/2024)  Chief Complaint: Chronic problems general questions HPI Form

## 2024-01-18 ENCOUNTER — TELEPHONE (OUTPATIENT)
Dept: FAMILY MEDICINE | Facility: CLINIC | Age: 64
End: 2024-01-18
Payer: MEDICARE

## 2024-01-18 NOTE — TELEPHONE ENCOUNTER
Rx was only for 2 times daily- RN should maybe call patient and educate seems like a misunderstanding

## 2024-01-18 NOTE — TELEPHONE ENCOUNTER
Message routed to Dr Soria for review    Sunita Polk RN  [facility]    Will increase clonidine per Dr Soria's note from virtual visit on 1/17/24    Rx was only for 2 times daily- RN should maybe call patient and educate seems like a misunderstanding     Kim Cat  [facility]   PT IS  CLONIDINE 3 TABS A DAY IT MAKING THE PT FEEL DIZZY SO HE ONLY TAKING 2 TAB DAILY NOW

## 2024-02-06 ENCOUNTER — OFFICE VISIT (OUTPATIENT)
Dept: OPHTHALMOLOGY | Facility: CLINIC | Age: 64
End: 2024-02-06
Attending: OPHTHALMOLOGY
Payer: MEDICARE

## 2024-02-06 DIAGNOSIS — Z98.890 POSTOPERATIVE EYE STATE: ICD-10-CM

## 2024-02-06 DIAGNOSIS — Q13.81 AXENFELD-RIEGER SYNDROME: ICD-10-CM

## 2024-02-06 DIAGNOSIS — Z98.890 POSTOPERATIVE EYE STATE: Primary | ICD-10-CM

## 2024-02-06 DIAGNOSIS — H40.52X3 GLAUCOMA SECONDARY TO OTHER EYE DISORDERS, LEFT EYE, SEVERE STAGE: Primary | ICD-10-CM

## 2024-02-06 PROCEDURE — 99024 POSTOP FOLLOW-UP VISIT: CPT | Mod: GC | Performed by: OPHTHALMOLOGY

## 2024-02-06 PROCEDURE — 999N000103 HC STATISTIC NO CHARGE FACILITY FEE

## 2024-02-06 PROCEDURE — G0463 HOSPITAL OUTPT CLINIC VISIT: HCPCS | Mod: 27 | Performed by: OPHTHALMOLOGY

## 2024-02-06 PROCEDURE — 76512 OPH US DX B-SCAN: CPT | Performed by: OPHTHALMOLOGY

## 2024-02-06 PROCEDURE — G0463 HOSPITAL OUTPT CLINIC VISIT: HCPCS | Performed by: OPHTHALMOLOGY

## 2024-02-06 ASSESSMENT — VISUAL ACUITY
OD_SC: PROS.
OD_SC: PROS.
METHOD: SNELLEN - LINEAR
OS_SC: HM
METHOD: SNELLEN - LINEAR
OS_SC: HM

## 2024-02-06 ASSESSMENT — SLIT LAMP EXAM - LIDS
COMMENTS: NORMAL
COMMENTS: MILD EDEMA
COMMENTS: NORMAL
COMMENTS: MILD EDEMA

## 2024-02-06 ASSESSMENT — TONOMETRY
IOP_METHOD: APPLANATION
OS_IOP_MMHG: 10
IOP_METHOD: TONOPEN
OS_IOP_MMHG: 12
OS_IOP_MMHG: 10
OD_IOP_MMHG: PROS.
OD_IOP_MMHG: PROS.
IOP_METHOD: TONOPEN

## 2024-02-06 NOTE — PROGRESS NOTES
Chief Complaint/Presenting Concern: Post operative visit     History of Present Illness:   Bright Lockett is a 63 year old patient who presents for post op. Per chart note from [facility] on 07/07/23, he previously was followed by Dr. Ritter, but had not been on drops for years at that time. At that visit, his eye pressure was in the 30s. He has a history of glaucoma, Reiger's anomaly, and a cataract in the left eye.     Today POW7 s/p CE/IOL with Dr. Hager and JOYCE with Dr. Hamilton 12/18/23. Vision in the left eye seems worse per patient.    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 Dillon); Per patient, has had at least two surgeries in the left eye  CEIOL/Ahmed 12/18/23  Maximum intraocular pressure x/35 mmHg  Currently Meds: Timolol twice daily in left eye (previously at up to three eye drops)  Family history: positive: Possibly mother  CCT (um): 11/14/2023: x/555  Gonio: 11/14/2023:   Trauma history: negative  Steroid exposure: negative  Vasospastic disease: Migrane/Raynaud phenomenon: positive, Migraines  A past hemodynamic crisis or Low BP:: positive: History of GI surgery to remove blockage, patient has to be resuscitated on table  Meds AEs/intolerance: None  PMHx: Heart failure, Chronic kidney  disease  Anticoagulants: None  Previous testing:  Visual field November 14, 2023: Unable to obtain   OCT Optic Nerve RNFL Spectralis November 14, 2023  right eye: X  left eye: Unable to obtain adequate image    Today's testing:  IOP  feels low on palpation. 12mmHg by applanation but this may be inacurrate due to corneal edema  Inferior haptic appears to be touching cornea with diffuse D-folds  Heme overlying optic  B-scan today no scar choroidals     Additional Ocular History:   Enucleation, right eye (~1975)    Kisha's anomaly, left eye    Pseudophakia, left eye    Plan/Recommendations:  Discussed findings with patient.  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   Underwent CEIOL/JOYCE on 12/18/23 with post-op hyphema. IOP feels low today with shallow AC. Will stop Timolol.  Inferior haptic appears displaced into the AC.  Patient seeing Dr. Hager today for follow up today. Patient may require DSEK + sutured IOL  Taper Prednisolone per Dr. Hager  Stop Timolol    RTC in 4 weeks VA, IOP     Kiera Torres MD  PGY3 Ophthalmology Resident  [facility]      Physician Attestation     Attending Physician Attestation:  Complete documentation of historical and exam elements from today's encounter can be found in the full encounter summary report (not reduplicated in this progress note). I personally obtained the chief complaint(s) and history of present illness. I confirmed and edited as necessary the review of systems, past medical/surgical history, family history, social history, and examination findings as documented by others; and I examined the patient myself. I personally reviewed the relevant tests, images, and reports as documented above. I formulated and edited as necessary the assessment and plan and discussed the findings and management plan with the patient and any family members present at the time of the visit.  Flori Hamilton M.D., Glaucoma, February 6,  2024

## 2024-02-06 NOTE — PROGRESS NOTES
S/P left eye CE IOL - zonulysis was present, CTR was placed (Madan) + Glaucoma ahmed tube shunt (Alfonso) 12/18/23    Eye is comfortable without pain; very little improvement in vision if any  IOP 10 today  Inferior IOL haptic still appears appears pushed forward out of the capsular bag (or pulled forward) into AC with overlying heme and fibrin. Corneal touch?    Drops:  Prednisolone to 6x/day left eye   Timolol BID left eye (Dr. Hamilton discontinued today)    Plan:  - CTR is in the bag but IOL haptic and part of the optic has been pushed (or pulled by fibrin) forward; exam appears stable today without much improvement.  - I don't think that there is anything pushing the IOL from vitreous cavity side; it is likely that IOL is being pulled by the fibrin in AC; Consider IOL repositioning and AC washout  - risks, benefits, and alternatives discussed; will wait for another 3-4 w for hyphema to reabsorb, if continues to have hyphema and fibrin in AC, then will proceed with AC reformation      Medications to operative eye:   Continue Prednisolone (white or pink top) 6/day left eye per Dr. Hamilton  Discontinue timolol 2x/day left eye per Dr. Alfonso Carl MD  Resident Physician, PGY-3  Department of Ophthalmology    Complete documentation of historical and exam elements from today's encounter can be found in the full encounter summary report (not reduplicated in this progress note). I personally obtained the chief complaint(s) and history of present illness.  I confirmed and edited as necessary the review of systems, past medical/surgical history, family history, social history, and examination findings as documented by others; and I examined the patient myself. I personally reviewed the relevant tests, images, and reports as documented above. I formulated and edited as necessary the assessment and plan and discussed the findings and management plan with the patient and family.    Dre Hager,  MD  , Vitreoretinal surgery   Department of Ophthalmology  [facility]

## 2024-02-06 NOTE — NURSING NOTE
Chief Complaints and History of Present Illnesses   Patient presents with    Post Op (Ophthalmology) Left Eye     Chief Complaint(s) and History of Present Illness(es)       Post Op (Ophthalmology) Left Eye              Laterality: left eye    Onset: 1 week ago              Comments    S/p CE/IOL, CTR, iridectomy LE 12/18/23-Pt. States that VA seems to have worsened LE. Was able to see light better before. No pain BE.   Corin Negron COT 12:34 PM February 6, 2024

## 2024-02-07 ENCOUNTER — VIRTUAL VISIT (OUTPATIENT)
Dept: FAMILY MEDICINE | Facility: CLINIC | Age: 64
End: 2024-02-07
Payer: MEDICARE

## 2024-02-07 DIAGNOSIS — Z12.11 SCREEN FOR COLON CANCER: Primary | ICD-10-CM

## 2024-02-07 DIAGNOSIS — E87.6 HYPOKALEMIA: ICD-10-CM

## 2024-02-07 DIAGNOSIS — I10 ESSENTIAL HYPERTENSION: ICD-10-CM

## 2024-02-07 DIAGNOSIS — R60.9 EDEMA, UNSPECIFIED TYPE: ICD-10-CM

## 2024-02-07 PROCEDURE — 99441 PR PHYSICIAN TELEPHONE EVALUATION 5-10 MIN: CPT | Mod: 93 | Performed by: FAMILY MEDICINE

## 2024-02-07 RX ORDER — POTASSIUM CHLORIDE 750 MG/1
10 TABLET, EXTENDED RELEASE ORAL 2 TIMES DAILY
Start: 2024-02-07 | End: 2024-04-23

## 2024-02-07 RX ORDER — FUROSEMIDE 20 MG
20 TABLET ORAL DAILY
Start: 2024-02-07 | End: 2024-04-23

## 2024-02-07 RX ORDER — AMLODIPINE BESYLATE 2.5 MG/1
2.5 TABLET ORAL DAILY
Qty: 90 TABLET | Refills: 1 | Status: SHIPPED | OUTPATIENT
Start: 2024-02-07 | End: 2024-06-03

## 2024-02-07 NOTE — PROGRESS NOTES
Bright is a 64 year old who is being evaluated via a billable telephone visit.      What phone number would you like to be contacted at? [phone]  How would you like to obtain your AVS? MyChart    Follow-up blood pressure.  Patient was prescribed clonidine 0.3 twice daily instead of 0.2 twice daily at time of last visit.  This is in addition to furosemide 20, losartan 100, metoprolol  and potassium chloride.  Patient went back down to 0.2 twice daily after some dizziness.  Pressures remain averaging about 1 40-1 50 over 70s.  Some numbers are much higher.    Plan will be to trial a low-dose of a calcium channel blocker in addition to clonidine furosemide losartan and metoprolol.  Patient needs secondary workup now that he is on more than 3 medications including a diuretic.  Check aldosterone renal ratio and consider renal artery imaging.  Will follow-up in person in 3 weeks.    Patient sounded in good spirits and calm.  No respiratory issues.      Phone call duration: 8 minutes  Signed Electronically by: Ladarius Soria MD    Answers submitted by the patient for this visit:  Hypertension Visit (Submitted on 2/7/2024)  Chief Complaint: Chronic problems general questions HPI Form  Do you check your blood pressure regularly outside of the clinic?: Yes  Are your blood pressures ever more than 140 on the top number (systolic) OR more than 90 on the bottom number (diastolic)? (For example, greater than 140/90): Yes  Are you following a low salt diet?: Yes  General Questionnaire (Submitted on 2/7/2024)  Chief Complaint: Chronic problems general questions HPI Form

## 2024-03-05 ENCOUNTER — OFFICE VISIT (OUTPATIENT)
Dept: OPHTHALMOLOGY | Facility: CLINIC | Age: 64
End: 2024-03-05
Attending: OPHTHALMOLOGY
Payer: MEDICARE

## 2024-03-05 DIAGNOSIS — H40.9 ADVANCED STAGE GLAUCOMA: ICD-10-CM

## 2024-03-05 DIAGNOSIS — Z98.890 POSTOPERATIVE EYE STATE: Primary | ICD-10-CM

## 2024-03-05 DIAGNOSIS — Q13.81: ICD-10-CM

## 2024-03-05 DIAGNOSIS — H21.02 HYPHEMA OF LEFT EYE: Primary | ICD-10-CM

## 2024-03-05 DIAGNOSIS — Q13.81 AXENFELD-RIEGER SYNDROME: ICD-10-CM

## 2024-03-05 DIAGNOSIS — Z96.1 PSEUDOPHAKIA OF LEFT EYE: ICD-10-CM

## 2024-03-05 DIAGNOSIS — H27.112 SUBLUXATION OF LEFT LENS: ICD-10-CM

## 2024-03-05 DIAGNOSIS — H40.52X3 GLAUCOMA SECONDARY TO OTHER EYE DISORDERS, LEFT EYE, SEVERE STAGE: ICD-10-CM

## 2024-03-05 PROCEDURE — 99024 POSTOP FOLLOW-UP VISIT: CPT | Mod: GC | Performed by: OPHTHALMOLOGY

## 2024-03-05 PROCEDURE — G0463 HOSPITAL OUTPT CLINIC VISIT: HCPCS | Performed by: OPHTHALMOLOGY

## 2024-03-05 PROCEDURE — 999N000103 HC STATISTIC NO CHARGE FACILITY FEE

## 2024-03-05 PROCEDURE — 99024 POSTOP FOLLOW-UP VISIT: CPT | Performed by: OPHTHALMOLOGY

## 2024-03-05 ASSESSMENT — TONOMETRY
OS_IOP_MMHG: 13
OD_IOP_MMHG: --
OD_IOP_MMHG: --
OS_IOP_MMHG: 13
IOP_METHOD: TONOPEN
IOP_METHOD: TONOPEN

## 2024-03-05 ASSESSMENT — VISUAL ACUITY
OD_SC: PROS
METHOD: SNELLEN - LINEAR
OD_SC: PROS
METHOD: SNELLEN - LINEAR

## 2024-03-05 ASSESSMENT — CONF VISUAL FIELD
OS_SUPERIOR_TEMPORAL_RESTRICTION: 1
OD_SUPERIOR_TEMPORAL_RESTRICTION: 1
OD_INFERIOR_TEMPORAL_RESTRICTION: 1
OS_SUPERIOR_NASAL_RESTRICTION: 1
OS_SUPERIOR_TEMPORAL_RESTRICTION: 1
OS_INFERIOR_NASAL_RESTRICTION: 1
OD_SUPERIOR_NASAL_RESTRICTION: 1
OS_INFERIOR_TEMPORAL_RESTRICTION: 1
OS_INFERIOR_TEMPORAL_RESTRICTION: 1
OS_INFERIOR_NASAL_RESTRICTION: 1
OD_SUPERIOR_TEMPORAL_RESTRICTION: 1
OD_INFERIOR_TEMPORAL_RESTRICTION: 1
OD_SUPERIOR_NASAL_RESTRICTION: 1
OS_SUPERIOR_NASAL_RESTRICTION: 1
OD_INFERIOR_NASAL_RESTRICTION: 1
OD_INFERIOR_NASAL_RESTRICTION: 1

## 2024-03-05 ASSESSMENT — SLIT LAMP EXAM - LIDS
COMMENTS: MILD EDEMA
COMMENTS: NORMAL

## 2024-03-05 NOTE — NURSING NOTE
Chief Complaints and History of Present Illnesses   Patient presents with    Follow Up     S/P left eye CE IOL - zonulysis was present, CTR was placed (Madan) + Glaucoma ahmed tube shunt (Sheheitli) 12/18/23     Chief Complaint(s) and History of Present Illness(es)       Follow Up              Comments: S/P left eye CE IOL - zonulysis was present, CTR was placed (Madan) + Glaucoma ahmed tube shunt (Sheheitli) 12/18/23              Comments    Pt here with his Brother Kel today.  Pt states vision is still limited in LE, no improvement in vision of LE. No eye pain today. No new flashes or floaters.  No DM.    BRENDA Ledesma March 5, 2024 12:25 PM

## 2024-03-05 NOTE — NURSING NOTE
Chief Complaints and History of Present Illnesses   Patient presents with    Glaucoma Follow Up     4 week follow up      Chief Complaint(s) and History of Present Illness(es)       Glaucoma Follow Up              Comments: 4 week follow up               Comments    Pt here with his Brother Kel today.  Pt states vision is still limited in LE, no improvement in vision of LE. No eye pain today. No new flashes or floaters.  No DM.    BRENDA Ledesma March 5, 2024 12:25 PM

## 2024-03-05 NOTE — PROGRESS NOTES
S/P left eye CE IOL - zonulysis was present, CTR was placed (Madan) + Glaucoma ahmed tube shunt (Alfonso) 12/18/23    History of Present Illness:   Bright Lockett is a 64 year old patient who presents for post op. Per chart note from [facility] on 07/07/23, he previously was followed by Dr. Ritter, but had not been on drops for years at that time. At that visit, his eye pressure was in the 30s. He has a history of glaucoma, Reiger's anomaly, and a cataract in the left eye.    Eye is comfortable without pain; very little improvement in vision if any  IOP 13 today  Inferior IOL haptic still appears appears pushed forward out of the capsular bag (or pulled forward) into AC with overlying heme and fibrin. Corneal touch?    Drops:  Prednisolone to 6x/day left eye   Timolol BID left eye (Dr. 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  - I don't think that there is anything pushing the IOL from vitreous cavity side; it is likely that IOL is being pulled by the fibrin in AC;   - will plan for IOL repositioning and AC washout now that IOL did not move to its intended position and is likely touching the cornea  - risks, benefits, and alternatives of surgery discussed; risks include loss of vision, in ability to place a lens (and I recommend leaving him aphakic), retinal detachment, endophthalmitis, glaucoma.   - Bright agreed with the plan and understands potential complications  - case request placed     Medications to operative eye:   Prednisolone (white or pink top) 3/day left eye  stopped timolol left eye per Dr. Hamilton    Complete documentation of historical and exam elements from today's  encounter can be found in the full encounter summary report (not reduplicated in this progress note). I personally obtained the chief complaint(s) and history of present illness.  I confirmed and edited as necessary the review of systems, past medical/surgical history, family history, social history, and examination findings as documented by others; and I examined the patient myself. I personally reviewed the relevant tests, images, and reports as documented above. I formulated and edited as necessary the assessment and plan and discussed the findings and management plan with the patient and family.    Dre Hager MD  , Vitreoretinal surgery   Department of Ophthalmology  [facility]

## 2024-03-05 NOTE — PROGRESS NOTES
Chief Complaint/Presenting Concern: Post operative visit     History of Present Illness:   Bright Lockett is a 63 year old patient who presents for post op. Per chart note from [facility] on 07/07/23, he previously was followed by Dr. Ritter, but had not been on drops for years at that time. At that visit, his eye pressure was in the 30s. He has a history of glaucoma, Reiger's anomaly, and a cataract in the left eye.     Today POM3 s/p CE/IOL with Dr. Hager and JOYCE with Dr. Hamilton 12/18/23.    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 Dillon); Per patient, has had at least two surgeries in the left eye  CEIOL/Ahmed 12/18/23  Maximum intraocular pressure x/35 mmHg  Currently Meds: Timolol twice daily in left eye (previously at up to three eye drops)  Family history: positive: Possibly mother  CCT (um): 11/14/2023: x/555  Gonio: 11/14/2023:   Trauma history: negative  Steroid exposure: negative  Vasospastic disease: Migrane/Raynaud phenomenon: positive, Migraines  A past hemodynamic crisis or Low BP:: positive: History of GI surgery to remove blockage, patient has to be resuscitated on table  Meds AEs/intolerance: None  PMHx: Heart failure, Chronic kidney disease  Anticoagulants: None  Previous  testing:  Visual field November 14, 2023: Unable to obtain   OCT Optic Nerve RNFL Spectralis November 14, 2023  right eye: X  left eye: Unable to obtain adequate image    Today's testing:  IOP feels low on palpation.   Inferior haptic appears to be touching cornea with diffuse D-folds  Heme overlying optic    Additional Ocular History:   Enucleation, right eye (~1975)    Kisha's anomaly, left eye    Pseudophakia, left eye    Plan/Recommendations:  Discussed findings with patient.  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   Underwent CEIOL/JOYCE on 12/18/23 with post-op hyphema. IOP stable off drops   Inferior haptic appears displaced into the AC.  Patient seeing Dr. Hager today for follow up today. Patient may require DSEK + sutured IOL  Taper Prednisolone per Dr. Hager    RTC in 2 months [facility], IOP     Thank you for entrusting us with your care  Giovanna Loo MD, PGY3  Ophthalmology Resident  [facility]       Physician Attestation     Attending Physician Attestation:  Complete documentation of historical and exam elements from today's encounter can be found in the full encounter summary report (not reduplicated in this progress note). I personally obtained the chief complaint(s) and history of present illness. I confirmed and edited as necessary the review of systems, past medical/surgical history, family history, social history, and examination findings as documented by others; and I examined the patient myself. I personally reviewed the relevant tests, images, and reports as documented above. formulated and edited as necessary the assessment and plan and discussed the findings and management plan with the patient and any family members present at the time of the visit.  Flori Hamilton M.D., Glaucoma, March 8, 2024

## 2024-03-06 ENCOUNTER — TELEPHONE (OUTPATIENT)
Dept: OPHTHALMOLOGY | Facility: CLINIC | Age: 64
End: 2024-03-06
Payer: MEDICARE

## 2024-03-06 NOTE — TELEPHONE ENCOUNTER
Left voicemail for patient regarding scheduling surgery with Dr. Hager.  Provided contact number to discuss.  [phone]    Louise Ye, on 3/6/2024 at 8:22 AM

## 2024-03-08 ASSESSMENT — SLIT LAMP EXAM - LIDS
COMMENTS: NORMAL
COMMENTS: MILD EDEMA

## 2024-03-08 NOTE — TELEPHONE ENCOUNTER
Left voicemail for patient regarding scheduling surgery with Dr. Hager.  Provided contact number to discuss.  [phone]    Louise Ye, on 3/8/2024 at 9:04 AM

## 2024-03-12 NOTE — TELEPHONE ENCOUNTER
Left voicemail for  patient's sister  regarding scheduling surgery with Dr. Hager. Self-A-r-T message sent to patient requesting call back to schedule.  Provided contact number to discuss.  [phone]    Louise Ye, on 3/12/2024 at 10:12 AM

## 2024-03-14 NOTE — TELEPHONE ENCOUNTER
Spoke with patient's sister and was able to confirm all scheduled information.     Patient is scheduled for surgery with: Dr. Hager    Surgery Date: 4/29     Location: [facility]    H&P: to be completed by Primary Care team - patient instructed to schedule per patient, this will be scheduled with [facility] - scheduled on 4/24     Post-op:  4/30, 5/7, 5/29, in-person visit, with Dr Hager    Patient will receive a phone call from pre-admission nurses 1-2 days prior to surgery with arrival time and NPO instructions.    Patient aware times are subject to change up until day before surgery.     Patient questions/concerns: N/A     Surgery packet was sent via US mail 3/14      Louise Ye on 3/14/2024 at 10:24 AM

## 2024-03-15 ENCOUNTER — OFFICE VISIT (OUTPATIENT)
Dept: UROLOGY | Facility: CLINIC | Age: 64
End: 2024-03-15
Payer: MEDICARE

## 2024-03-15 VITALS — HEART RATE: 51 BPM | DIASTOLIC BLOOD PRESSURE: 62 MMHG | OXYGEN SATURATION: 99 % | SYSTOLIC BLOOD PRESSURE: 111 MMHG

## 2024-03-15 DIAGNOSIS — R35.0 URINARY FREQUENCY: Primary | ICD-10-CM

## 2024-03-15 DIAGNOSIS — N40.1 BENIGN PROSTATIC HYPERPLASIA WITH LOWER URINARY TRACT SYMPTOMS, SYMPTOM DETAILS UNSPECIFIED: ICD-10-CM

## 2024-03-15 PROCEDURE — 99204 OFFICE O/P NEW MOD 45 MIN: CPT | Mod: 25 | Performed by: UROLOGY

## 2024-03-15 PROCEDURE — 51798 US URINE CAPACITY MEASURE: CPT | Performed by: UROLOGY

## 2024-03-15 RX ORDER — OXYBUTYNIN CHLORIDE 5 MG/1
5 TABLET ORAL AT BEDTIME
Qty: 90 TABLET | Refills: 0 | Status: SHIPPED | OUTPATIENT
Start: 2024-03-15

## 2024-03-15 RX ORDER — OXYBUTYNIN CHLORIDE 5 MG/1
5 TABLET ORAL AT BEDTIME
Qty: 60 TABLET | Refills: 1 | Status: SHIPPED | OUTPATIENT
Start: 2024-03-15 | End: 2024-03-15

## 2024-03-15 NOTE — PROGRESS NOTES
S: Bright Lockett is a pleasant  64 year old male who was requested to be seen by  Ladarius Soria for a consult with regard to patient's urinary complaints.  Patient complains of Sensation of incomplete emptying, Nocturia x 4-5, Urgency, Frequency, and slow urinary stream.  He has no history of elevated PSA.  Symptoms have been on going for   many years(s).  Seems to be worsened over time.  His recent PSA was found to be   Prostate Specific Antigen Screen   Date Value Ref Range Status   10/09/2012 2.6 <3.6 ng/mL Final     Comment:     Method is Abbott Prostate-Specific Antigen (PSA)            Standard-WHO 1st International (90:10) as of 09/26/05     .  His AUA Symptom Score:  22.  He has no dysuria or hematuria.  He is on both flomax and proscar.    Current Outpatient Medications   Medication Sig Dispense Refill    amLODIPine (NORVASC) 2.5 MG tablet Take 1 tablet (2.5 mg) by mouth daily 90 tablet 1    finasteride (PROSCAR) 5 MG tablet TAKE ONE TABLET BY MOUTH ONCE DAILY 90 tablet 3    furosemide (LASIX) 20 MG tablet Take 1 tablet (20 mg) by mouth daily      losartan (COZAAR) 100 MG tablet TAKE 1 TABLET(100 MG) BY MOUTH DAILY 90 tablet 3    metoprolol succinate ER (TOPROL XL) 200 MG 24 hr tablet TAKE 1 TABLET(200 MG) BY MOUTH DAILY 90 tablet 3    oxyBUTYnin (DITROPAN) 5 MG tablet Take 1 tablet (5 mg) by mouth at bedtime 60 tablet 1    potassium chloride ER (K-TAB/KLOR-CON) 10 MEQ CR tablet Take 1 tablet (10 mEq) by mouth 2 times daily      prednisoLONE acetate (PRED FORTE) 1 % ophthalmic suspension Place 1 drop Into the left eye 6 times daily 15 mL 3    tamsulosin (FLOMAX) 0.4 MG capsule TAKE 1 CAPSULE BY MOUTH DAILY AFTER BREAKFAST. 90 capsule 3    acetaZOLAMIDE (DIAMOX) 250 MG tablet Take 1 tablet by mouth 2 times daily (Patient not taking: Reported on 1/17/2024)      ascorbic acid, vitamin C, (VITAMIN C) 1000 MG tablet [ASCORBIC ACID, VITAMIN C, (VITAMIN C) 1000 MG TABLET] Take 1,000 mg by mouth daily. (Patient  not taking: Reported on 1/17/2024)      atorvastatin (LIPITOR) 20 MG tablet Take 1 tablet (20 mg) by mouth at bedtime      brinzolamide-brimonidine (SIMBRINZA) 1-0.2 % ophthalmic suspension Place 1 drop Into the left eye 3 times daily 8 mL 11    calcium-magnesium-zinc Tab [CALCIUM-MAGNESIUM-ZINC TAB] 1 tablet daily. (Patient not taking: Reported on 1/17/2024)      cloNIDine (CATAPRES) 0.1 MG tablet Take 2 tablets (0.2 mg) by mouth 2 times daily (Patient not taking: Reported on 1/17/2024)      cloNIDine (CATAPRES) 0.3 MG tablet Take 1 tablet (0.3 mg) by mouth 2 times daily 180 tablet 3    fluticasone (ALLERGY RELIEF, FLUTICASONE,) 50 mcg/actuation nasal spray [FLUTICASONE (ALLERGY RELIEF, FLUTICASONE,) 50 MCG/ACTUATION NASAL SPRAY] 2 sprays each nostril once daily (Patient not taking: Reported on 1/17/2024) 16 g 11    loratadine (CLARITIN) 10 mg tablet [LORATADINE (CLARITIN) 10 MG TABLET] Take 1 tablet (10 mg total) by mouth daily. (Patient not taking: Reported on 1/17/2024) 30 tablet 11    Netarsudil-Latanoprost (ROCKLATAN) 0.02-0.005 % SOLN Place 1 drop Into the left eye at bedtime (Patient not taking: Reported on 1/17/2024) 2.5 mL 11    ofloxacin (OCUFLOX) 0.3 % ophthalmic solution Place 1 drop Into the left eye 4 times daily (Patient not taking: Reported on 1/17/2024) 5 mL 0    omeprazole (PRILOSEC) 10 MG DR capsule Take 10 mg by mouth (Patient not taking: Reported on 1/17/2024)      omeprazole (PRILOSEC) 20 MG capsule [OMEPRAZOLE (PRILOSEC) 20 MG CAPSULE] Take 1 capsule (20 mg total) by mouth daily. 90 capsule 2    potassium chloride ER (KLOR-CON M) 10 MEQ CR tablet Take 10 mEq by mouth daily      timolol maleate (TIMOPTIC) 0.5 % ophthalmic solution Place 1 drop Into the left eye 2 times daily 10 mL 11    timolol maleate (TIMOPTIC) 0.5 % ophthalmic solution [TIMOLOL MALEATE (TIMOPTIC) 0.5 % OPHTHALMIC SOLUTION] Administer 1 drop to both eyes daily.       Allergies   Allergen Reactions    Morphine Nausea and  Vomiting     Past Medical History:   Diagnosis Date    Aortic sclerosis     Back pain     Benign neoplasm of adenomatous polyp     of the large intestine    Carpal tunnel syndrome     Cerebral palsy (H)     Diverticulosis     Esophageal reflux     Glaucoma     Hyperlipidemia     Hypertension     Left ventricular hypertrophy     Leg weakness     left    Neuritis     Osteoarthritis of knee      Past Surgical History:   Procedure Laterality Date    IMPLANT VALVE EYE Left 12/18/2023    Procedure: INSERTION, GLAUCOMA SHUNT IMPLANT, EYE - Left;  Surgeon: Flori Hamilton MD;  Location: [facility] OR    PHACOEMULSIFICATION WITH STANDARD INTRAOCULAR LENS IMPLANT Left 12/18/2023    Procedure: LEFT - Phacoemulsificaiton  CATARACT,  WITH INTRAOCULAR LENS IMPLANT INSERTION, CTR, iridectomy;  Surgeon: Dre Layton MD;  Location: [facility] OR    Z EXPLORATORY OF ABDOMEN      Description: Exploratory Laparotomy;  Recorded: 09/25/2008;  Comments: small bowel obstruction      Family History   Problem Relation Age of Onset    Glaucoma Mother     Alcoholism Mother     Dementia Mother     Arthritis Mother     Cancer Paternal Grandmother     Arthritis Sister     Arthritis Maternal Aunt     Arthritis Maternal Uncle     Arthritis Paternal Aunt     Arthritis Paternal Uncle     Macular Degeneration No family hx of      He does not have a family history of prostate cancer.  Social History     Socioeconomic History    Marital status: Single     Spouse name: None    Number of children: None    Years of education: None    Highest education level: None   Tobacco Use    Smoking status: Never     Passive exposure: Never    Smokeless tobacco: Never   Vaping Use    Vaping Use: Never used   Substance and Sexual Activity    Alcohol use: No    Drug use: No     Social Determinants of Health     Financial Resource Strain: Unknown (1/17/2024)    Financial Resource Strain     Within the past 12 months, have you or your family members you live with been  unable to get utilities (heat, electricity) when it was really needed?: Patient declined   Food Insecurity: Low Risk  (1/17/2024)    Food Insecurity     Within the past 12 months, did you worry that your food would run out before you got money to buy more?: No     Within the past 12 months, did the food you bought just not last and you didn t have money to get more?: Patient declined   Transportation Needs: Unknown (1/17/2024)    Transportation Needs     Within the past 12 months, has lack of transportation kept you from medical appointments, getting your medicines, non-medical meetings or appointments, work, or from getting things that you need?: Patient declined   Recent Concern: Transportation Needs - High Risk (12/5/2023)    Transportation Needs     Within the past 12 months, has lack of transportation kept you from medical appointments, getting your medicines, non-medical meetings or appointments, work, or from getting things that you need?: Yes   Housing Stability: Unknown (1/17/2024)    Housing Stability     Do you have housing? : Patient declined     Are you worried about losing your housing?: Patient declined        REVIEW OF SYSTEMS  =================  C: NEGATIVE for fever, chills, change in weight  I: NEGATIVE for worrisome rashes, moles or lesions  E/M: NEGATIVE for ear, mouth and throat problems  R: NEGATIVE for significant cough or SHORTNESS OF BREATH  CV:  NEGATIVE for chest pain, palpitations or peripheral edema  GI: NEGATIVE for nausea, abdominal pain, heartburn, or change in bowel habits  NEURO: NEGATIVE numbness/weakness  : see HPI  PSYCH: NEGATIVE depression/anxiety  LYmph: no new enlarged lymph nodes  Ortho: no new trauma/movements           O: Exam:/62 (BP Location: Left arm, Patient Position: Chair, Cuff Size: Adult Large)   Pulse 51   SpO2 99%    Constitutional: healthy, alert and no distress  Cardiovascular: negative, PMI normal.   Respiratory: negative, no evidence of respiratory  distress  Gastrointestinal: Abdomen soft, non-tender. BS normal. No masses, organomegaly  : penis no discharge.  Testis no masses.  No scrotal skin lesion. Prostate 50 gm smooth.  PVR < 50 ml  Musculoskeletal: extremities normal- no gross deformities noted, gait normal and normal muscle tone  Skin: no suspicious lesions or rashes  Neurologic: Alert and oriented  Psychiatric: mentation appears normal. and affect normal/bright  Hematologic/Lymphatic/Immunologic: normal ant/post cervical, axillary, supraclavicular and inguinal nodes    Assessment/Plan:   (R35.0) Urinary frequency  (primary encounter diagnosis)  Comment:  discussed fluid restriction several hours prior to bedtime  Plan: oxyBUTYnin (DITROPAN) 5 MG tablet at bedtime.  Patient to get back to me in several months for response.             (N40.1) Benign prostatic hyperplasia with lower urinary tract symptoms, symptom details unspecified  Comment:    Plan: MEASURE POST-VOID RESIDUAL URINE/BLADDER         CAPACITY, US NON-IMAGING (08744)         Cont flomax/proscar

## 2024-04-23 ENCOUNTER — OFFICE VISIT (OUTPATIENT)
Dept: FAMILY MEDICINE | Facility: CLINIC | Age: 64
End: 2024-04-23
Payer: MEDICARE

## 2024-04-23 VITALS
HEART RATE: 55 BPM | OXYGEN SATURATION: 97 % | WEIGHT: 163.5 LBS | DIASTOLIC BLOOD PRESSURE: 62 MMHG | SYSTOLIC BLOOD PRESSURE: 120 MMHG | BODY MASS INDEX: 26.79 KG/M2 | RESPIRATION RATE: 11 BRPM

## 2024-04-23 DIAGNOSIS — Z23 NEED FOR SHINGLES VACCINE: ICD-10-CM

## 2024-04-23 DIAGNOSIS — Z29.11 NEED FOR VACCINATION AGAINST RESPIRATORY SYNCYTIAL VIRUS: ICD-10-CM

## 2024-04-23 DIAGNOSIS — K21.9 GASTROESOPHAGEAL REFLUX DISEASE WITHOUT ESOPHAGITIS: ICD-10-CM

## 2024-04-23 DIAGNOSIS — E87.6 HYPOKALEMIA: ICD-10-CM

## 2024-04-23 DIAGNOSIS — Z12.11 SCREEN FOR COLON CANCER: Primary | ICD-10-CM

## 2024-04-23 DIAGNOSIS — R73.9 HYPERGLYCEMIA: ICD-10-CM

## 2024-04-23 DIAGNOSIS — N18.31 STAGE 3A CHRONIC KIDNEY DISEASE (H): ICD-10-CM

## 2024-04-23 DIAGNOSIS — M75.81 ROTATOR CUFF TENDINITIS, RIGHT: ICD-10-CM

## 2024-04-23 DIAGNOSIS — E78.00 HYPERCHOLESTEROLEMIA: ICD-10-CM

## 2024-04-23 DIAGNOSIS — I10 ESSENTIAL HYPERTENSION: ICD-10-CM

## 2024-04-23 DIAGNOSIS — R79.89 ABNORMAL ALDOSTERONE TO RENIN RATIO: ICD-10-CM

## 2024-04-23 DIAGNOSIS — R60.9 EDEMA, UNSPECIFIED TYPE: ICD-10-CM

## 2024-04-23 DIAGNOSIS — Z01.818 PREOP EXAMINATION: ICD-10-CM

## 2024-04-23 LAB
HBA1C MFR BLD: 5.9 % (ref 0–5.6)
HGB BLD-MCNC: 17.4 G/DL (ref 13.3–17.7)

## 2024-04-23 PROCEDURE — 84244 ASSAY OF RENIN: CPT | Mod: 90 | Performed by: FAMILY MEDICINE

## 2024-04-23 PROCEDURE — 99214 OFFICE O/P EST MOD 30 MIN: CPT | Mod: 25 | Performed by: FAMILY MEDICINE

## 2024-04-23 PROCEDURE — 85018 HEMOGLOBIN: CPT | Performed by: FAMILY MEDICINE

## 2024-04-23 PROCEDURE — 80053 COMPREHEN METABOLIC PANEL: CPT | Performed by: FAMILY MEDICINE

## 2024-04-23 PROCEDURE — 90480 ADMN SARSCOV2 VAC 1/ONLY CMP: CPT | Performed by: FAMILY MEDICINE

## 2024-04-23 PROCEDURE — 36415 COLL VENOUS BLD VENIPUNCTURE: CPT | Performed by: FAMILY MEDICINE

## 2024-04-23 PROCEDURE — 82088 ASSAY OF ALDOSTERONE: CPT | Performed by: FAMILY MEDICINE

## 2024-04-23 PROCEDURE — 91320 SARSCV2 VAC 30MCG TRS-SUC IM: CPT | Performed by: FAMILY MEDICINE

## 2024-04-23 PROCEDURE — 83036 HEMOGLOBIN GLYCOSYLATED A1C: CPT | Performed by: FAMILY MEDICINE

## 2024-04-23 PROCEDURE — 82043 UR ALBUMIN QUANTITATIVE: CPT | Performed by: FAMILY MEDICINE

## 2024-04-23 PROCEDURE — 82570 ASSAY OF URINE CREATININE: CPT | Performed by: FAMILY MEDICINE

## 2024-04-23 PROCEDURE — 99000 SPECIMEN HANDLING OFFICE-LAB: CPT | Performed by: FAMILY MEDICINE

## 2024-04-23 RX ORDER — RESPIRATORY SYNCYTIAL VIRUS VACCINE 120MCG/0.5
0.5 KIT INTRAMUSCULAR ONCE
Qty: 1 EACH | Refills: 0 | Status: CANCELLED | OUTPATIENT
Start: 2024-04-23 | End: 2024-04-23

## 2024-04-23 RX ORDER — POTASSIUM CHLORIDE 750 MG/1
10 TABLET, EXTENDED RELEASE ORAL 2 TIMES DAILY
Qty: 180 TABLET | Refills: 1 | Status: SHIPPED | OUTPATIENT
Start: 2024-04-23 | End: 2024-05-30

## 2024-04-23 RX ORDER — POTASSIUM CHLORIDE 750 MG/1
10 TABLET, EXTENDED RELEASE ORAL 2 TIMES DAILY
Qty: 90 TABLET | Refills: 3 | Status: SHIPPED | OUTPATIENT
Start: 2024-04-23 | End: 2024-04-23

## 2024-04-23 RX ORDER — CLONIDINE HYDROCHLORIDE 0.1 MG/1
0.2 TABLET ORAL 2 TIMES DAILY
Qty: 360 TABLET | Refills: 2 | Status: SHIPPED | OUTPATIENT
Start: 2024-04-23

## 2024-04-23 RX ORDER — FUROSEMIDE 20 MG
20 TABLET ORAL DAILY
Qty: 90 TABLET | Refills: 2 | Status: SHIPPED | OUTPATIENT
Start: 2024-04-23

## 2024-04-23 RX ORDER — ATORVASTATIN CALCIUM 20 MG/1
20 TABLET, FILM COATED ORAL AT BEDTIME
Qty: 90 TABLET | Refills: 3 | Status: SHIPPED | OUTPATIENT
Start: 2024-04-23

## 2024-04-23 NOTE — LETTER
"My Heart Failure Action Plan  Name: Bright Lockett   YOB: 1960  Date: 4/23/2024   My doctor:   Ladarius Soria [facility] [address] [place] [id number] [phone] My Diagnosis: {HF TYPE:895247}  My Ejection Fraction: No results found for: \"LVEF\"  {EF% (Optional):242123}  My Exercise Goal: Start exercise slowly - to begin, do a few minutes of exercise, several times a day. Increase your time and speed [id number] to build tolerance, with a goal of 30 minutes of exercise daily. Steady, slow, and consistent exercise is both safe and healthy. Stop and rest when you feel tired or become short of breath. Do not push yourself on days when you don t feel well.       My Weight Plan:   Wt Readings from Last 2 Encounters:   04/23/24 74.2 kg (163 lb 8 oz)   12/18/23 74.5 kg (164 lb 3.9 oz)     Weigh yourself daily using the same scale. If you gain more than 2 pounds in 24 hours or 5 pounds in 7 days. {:[id number]}    My Diet Goal: { :347071::\"No added salt\"}    Emergency Room Visits:    Our goal is to improve your quality of life and help you avoid a visit to the emergency room or hospital.  If we work together, we can achieve this goal. But, if you feel you need to call 911 or go to the emergency room, please do so.  If you go to the emergency room, please bring your list of medicines and your daily weight chart with you.    Each day ask yourself:  Is my weight up?  Do I have swelling?  Do I have trouble breathing?  How did I sleep?  Other problems?       GREEN ZONE     Weight gained is no more than 2 pounds a day or 5 pounds a in 7 days.  No swelling in feet, ankles, legs or stomach.  No more swelling than usual.  No more trouble breathing than usual.  No change in my sleep.  No other problems. What should I do?  I am doing fine. I will take my medicine, follow my diet, see my doctor, exercise, and watch for symptoms.           YELLOW ZONE         Weight gain of " more than 2 pounds in one day or 5 pounds in 7 days.  New swelling in ankle, leg, knee or thigh.  Bloating in belly, pants feel tighter.  Swelling in hands or face.  Coughing or trouble breathing while walking or talking.  Harder to breathe last night.  Have trouble sleeping, wake up short of breath.  Unusually tired.  Not eating.  Nausea, vomiting, or diarrhea  Pain in my chest or bad  leg cramps.  Feel weak or dizzy. What should I do?  I need to take action and call my doctor or nurse today.                 RED ZONE         Weight gain of 5 pounds overnight.  Chest pain or pressure that does not go away.  Feel less alert.  Wheezing or have trouble breathing when at rest.  Cannot sleep lying down.  Cannot take my medicines.  Pass out or faint. What should I do?  I need to call my doctor or nurse now!  Call 911 if I have chest pain or cannot breathe.

## 2024-04-23 NOTE — PROGRESS NOTES
Preoperative Evaluation  [facility] [address] [place] [address]  Phone: [phone]  Fax: [phone]  Primary Provider: Ladarius Soria  Pre-op Performing Provider: LADARIUS SORIA  Apr 23, 2024       Bright is a 64 year old, presenting for the following:  Pre-Op Exam, Arm Problem (Right arm feels very weak and that is new), and Tasting of things that are sweet has diminished        4/23/2024     8:36 AM   Additional Questions   Roomed by Dorota AGUSTIN     Surgical Information  Surgery/Procedure: LEFT REPLACEMENT, INTRAOCULAR LENS IMPLANT + AC washout   Surgery Location: [facility]  Surgeon: Dr. Madan Yepez  Surgery Date: 4/29/24  Time of Surgery: 12:55pm  Where patient plans to recover: At home with family  Fax number for surgical facility: Note does not need to be faxed, will be available     Subjective       HPI related to upcoming procedure:  Cataract surgery L eye 12/18- had some bleeding (?)  Plan is some removal of blood, replacement of IOL    History of spastic cerebral palsy.    Resistant hypertension with complications including diastolic congestive heart failure and chronic kidney disease stage III.  Also history of hypokalemia-will be evaluated for hyperaldosteronism today.  Has been out of his furosemide for a time, but states that his shortness of breath and lower extremity swelling is stable.    History of severe BPH with urgency.  Recently started on oxybutynin which is helpful.    Complains of months of weakness discomfort right shoulder.    Wt Readings from Last 3 Encounters:   04/23/24 74.2 kg (163 lb 8 oz)   12/18/23 74.5 kg (164 lb 3.9 oz)   12/05/23 76 kg (167 lb 8 oz)            4/23/2024     8:25 AM   Preop Questions   1. Have you ever had a heart attack or stroke? No   2. Have you ever had surgery on your heart or blood vessels, such as a stent placement, a coronary artery bypass, or surgery on an artery  in your head, neck, heart, or legs? No   3. Do you have chest pain with activity? No   4. Do you have a history of  heart failure? No   5. Do you currently have a cold, bronchitis or symptoms of other infection? No   6. Do you have a cough, shortness of breath, or wheezing? No   7. Do you or anyone in your family have previous history of blood clots? No   8. Do you or does anyone in your family have a serious bleeding problem such as prolonged bleeding following surgeries or cuts? No   9. Have you ever had problems with anemia or been told to take iron pills? No   10. Have you had any abnormal blood loss such as black, tarry or bloody stools? No   11. Have you ever had a blood transfusion? No   12. Are you willing to have a blood transfusion if it is medically needed before, during, or after your surgery? Yes   13. Have you or any of your relatives ever had problems with anesthesia? No   14. Do you have sleep apnea, excessive snoring or daytime drowsiness? No   15. Do you have any artifical heart valves or other implanted medical devices like a pacemaker, defibrillator, or continuous glucose monitor? No   16. Do you have artificial joints? No   17. Are you allergic to latex? No           Patient Active Problem List    Diagnosis Date Noted    Hearing loss, unspecified hearing loss type, unspecified laterality 12/05/2023     Priority: Medium    Left ventricular hypertrophy 07/14/2022     Priority: Medium     Formatting of this note might be different from the original.  Created by Conversion      Last Assessment & Plan:   Formatting of this note might be different from the original.  Noted on echocardiogram again, probably some diastolic dysfunction.  Discussed importance of good blood pressure control.      Hypertensive heart and chronic kidney disease with heart failure and stage 1 through stage 4 chronic kidney disease, or unspecified chronic kidney disease (H) 07/14/2022     Priority: Medium    Heart failure with  preserved ejection fraction (H) 06/10/2022     Priority: Medium    Edema, unspecified type 03/31/2021     Priority: Medium    Essential hypertension      Priority: Medium     Created by Conversion  Replacement Utility updated for latest IMO load        Cervical lymphadenopathy 12/30/2020     Priority: Medium    Hyperglycemia 12/30/2020     Priority: Medium    Stage 3a chronic kidney disease (H) 12/30/2020     Priority: Medium    Hypercholesterolemia      Priority: Medium     16.1% 10-year risk based on 6/18/2019 calculation        Left Ventricular Hypertrophy      Priority: Medium     Created by Conversion        Daytime sleepiness 07/18/2019     Priority: Medium    Unspecified glaucoma      Priority: Medium     Created by Conversion        Healthcare maintenance 06/18/2019     Priority: Medium    Benign prostatic hyperplasia with lower urinary tract symptoms, symptom details unspecified 06/18/2019     Priority: Medium    Urinary frequency 08/15/2018     Priority: Medium    Headache 03/07/2018     Priority: Medium    Cough 03/07/2018     Priority: Medium    Proteinuria 01/15/2018     Priority: Medium     300+  mg on 24-hour urine collection August 2019.  Serology for secondary causes negative  Due to previously uncontrolled hypertension        Osteoarthritis Of The Knee      Priority: Medium     Created by Conversion  Replacement Utility updated for latest IMO load    Replacing diagnoses that were inactivated after the 10/1/2021 regulatory import.      Cerebral Palsy      Priority: Medium     Created by Conversion  Replacement Utility updated for latest IMO load        Esophageal reflux      Priority: Medium     Created by Conversion        Aortic Sclerosis      Priority: Medium     Created by Conversion  Replacement Utility updated for latest IMO load        Diverticulosis      Priority: Medium     Created by Conversion  Replacement Utility updated for latest IMO load        Acute bilateral low back pain without  sciatica 05/12/2015     Priority: Medium    Neuritis      Priority: Medium     Created by Conversion        Benign Adenomatous Polyp Of The Large Intestine      Priority: Medium     Created by Conversion        Personal history of colonic polyps 11/30/2012     Priority: Medium     Formatting of this note might be different from the original.  Previous polyps; 11-30-12 two rectal polyps        Past Medical History:   Diagnosis Date    Aortic sclerosis     Back pain     Benign neoplasm of adenomatous polyp     of the large intestine    Carpal tunnel syndrome     Cerebral palsy (H)     Diverticulosis     Esophageal reflux     Glaucoma     Hyperlipidemia     Hypertension     Left ventricular hypertrophy     Leg weakness     left    Neuritis     Osteoarthritis of knee      Past Surgical History:   Procedure Laterality Date    IMPLANT VALVE EYE Left 12/18/2023    Procedure: INSERTION, GLAUCOMA SHUNT IMPLANT, EYE - Left;  Surgeon: Flori Hamilton MD;  Location: [facility] OR    PHACOEMULSIFICATION WITH STANDARD INTRAOCULAR LENS IMPLANT Left 12/18/2023    Procedure: LEFT - Phacoemulsificaiton  CATARACT,  WITH INTRAOCULAR LENS IMPLANT INSERTION, CTR, iridectomy;  Surgeon: Dre Layton MD;  Location:  OR    [facility] EXPLORATORY OF ABDOMEN      Description: Exploratory Laparotomy;  Recorded: 09/25/2008;  Comments: small bowel obstruction     Current Outpatient Medications   Medication Sig Dispense Refill    amLODIPine (NORVASC) 2.5 MG tablet Take 1 tablet (2.5 mg) by mouth daily 90 tablet 1    atorvastatin (LIPITOR) 20 MG tablet Take 1 tablet (20 mg) by mouth at bedtime 90 tablet 3    cloNIDine (CATAPRES) 0.1 MG tablet Take 2 tablets (0.2 mg) by mouth 2 times daily 360 tablet 2    finasteride (PROSCAR) 5 MG tablet TAKE ONE TABLET BY MOUTH ONCE DAILY 90 tablet 3    furosemide (LASIX) 20 MG tablet Take 1 tablet (20 mg) by mouth daily 90 tablet 2    losartan (COZAAR) 100 MG tablet TAKE 1 TABLET(100 MG) BY MOUTH DAILY 90  "tablet 3    metoprolol succinate ER (TOPROL XL) 200 MG 24 hr tablet TAKE 1 TABLET(200 MG) BY MOUTH DAILY 90 tablet 3    omeprazole (PRILOSEC) 20 MG DR capsule Take 1 capsule (20 mg) by mouth daily 90 capsule 2    oxyBUTYnin (DITROPAN) 5 MG tablet Take 1 tablet (5 mg) by mouth at bedtime 90 tablet 0    potassium chloride ER (K-TAB/KLOR-CON) 10 MEQ CR tablet Take 1 tablet (10 mEq) by mouth 2 times daily 90 tablet 3    prednisoLONE acetate (PRED FORTE) 1 % ophthalmic suspension Place 1 drop Into the left eye 6 times daily 15 mL 3    tamsulosin (FLOMAX) 0.4 MG capsule TAKE 1 CAPSULE BY MOUTH DAILY AFTER BREAKFAST. 90 capsule 3    fluticasone (ALLERGY RELIEF, FLUTICASONE,) 50 mcg/actuation nasal spray [FLUTICASONE (ALLERGY RELIEF, FLUTICASONE,) 50 MCG/ACTUATION NASAL SPRAY] 2 sprays each nostril once daily (Patient not taking: Reported on 1/17/2024) 16 g 11    potassium chloride ER (KLOR-CON M) 10 MEQ CR tablet Take 10 mEq by mouth daily (Patient not taking: Reported on 4/23/2024)         Allergies   Allergen Reactions    Morphine Nausea and Vomiting        Social History     Tobacco Use    Smoking status: Never     Passive exposure: Never    Smokeless tobacco: Never   Substance Use Topics    Alcohol use: No       History   Drug Use No         Review of Systems  Full 10 system review including constitutional, respiratory, cardiac, gi, urinary, rheumatologic, neurologic, reproductive, dermatologic psychiatric is  performed  and is otherwise negative       Objective    /62   Pulse 55   Resp 11   Wt 74.2 kg (163 lb 8 oz)   SpO2 97%   BMI 26.79 kg/m     Estimated body mass index is 26.79 kg/m  as calculated from the following:    Height as of 12/18/23: 1.664 m (5' 5.5\").    Weight as of this encounter: 74.2 kg (163 lb 8 oz).  Physical Exam  Gen- alert, oriented/ appropriately responsive  HEENT-right eye prosthesis.  Left eye with apparent corneal or anterior chamber cloudiness.  Chest- Normal inspiration and " expiration.    Clear to ascultation.    No chest wall deformity or scar.  CV- Heart regular rate and rhythm  normal tones, no murmurs   No gallops or rubs.  Ext- appear well perfused 2+ edema.  Skin- warm and dry,   no visualized rash  Symmetric musculature of shoulder muscles and scapular muscles when viewed from behind.   There is no pain on resisted internal rotation and intact strength.  There is mild pain on resisted external rotation and slightly decreased strength  Empty can sign is positive      Recent Labs   Lab Test 12/05/23  0944 10/10/23  1133   HGB 16.9 16.3   PLT  --  186    141   POTASSIUM 4.1 4.2   CR 0.98 1.35*   A1C  --  5.6        Diagnostics  Pending    Low/intermediate risk surgery  No known ischemic cardiac disease  History of CHF, lower extremity edema but otherwise at baseline/well compensated.  Unknown functional capacity    Acceptable risk for surgery  No special recommendations:  Amlodipine, clonidine, losartan, metoprolol with sip of water morning of surgery.  Otherwise NPO.      Unrelated to surgery,  We table discussion of lack of taste today for future date.  Diagnosis of mild right rotator cuff tendinopathy.  Discussed option of physical therapy but with all that is going on with his health, he wished to defer that.    Signed Electronically by: Ladarius Soria MD  Copy of this evaluation report is provided to requesting physician.

## 2024-04-24 LAB
ALBUMIN SERPL BCG-MCNC: 4.3 G/DL (ref 3.5–5.2)
ALDOST SERPL-MCNC: 7.4 NG/DL (ref 0–31)
ALP SERPL-CCNC: 77 U/L (ref 40–150)
ALT SERPL W P-5'-P-CCNC: 15 U/L (ref 0–70)
ANION GAP SERPL CALCULATED.3IONS-SCNC: 9 MMOL/L (ref 7–15)
AST SERPL W P-5'-P-CCNC: 19 U/L (ref 0–45)
BILIRUB SERPL-MCNC: 1.1 MG/DL
BUN SERPL-MCNC: 25.9 MG/DL (ref 8–23)
CALCIUM SERPL-MCNC: 9.6 MG/DL (ref 8.8–10.2)
CHLORIDE SERPL-SCNC: 102 MMOL/L (ref 98–107)
CREAT SERPL-MCNC: 1.12 MG/DL (ref 0.67–1.17)
CREAT UR-MCNC: 68.2 MG/DL
DEPRECATED HCO3 PLAS-SCNC: 27 MMOL/L (ref 22–29)
EGFRCR SERPLBLD CKD-EPI 2021: 72 ML/MIN/1.73M2
GLUCOSE SERPL-MCNC: 72 MG/DL (ref 70–99)
MICROALBUMIN UR-MCNC: 166 MG/L
MICROALBUMIN/CREAT UR: 243.4 MG/G CR (ref 0–17)
POTASSIUM SERPL-SCNC: 4.2 MMOL/L (ref 3.4–5.3)
PROT SERPL-MCNC: 7.7 G/DL (ref 6.4–8.3)
SODIUM SERPL-SCNC: 138 MMOL/L (ref 135–145)

## 2024-04-27 LAB — RENIN PLAS-CCNC: 0.1 NG/ML/HR

## 2024-04-28 ENCOUNTER — ANESTHESIA EVENT (OUTPATIENT)
Dept: SURGERY | Facility: CLINIC | Age: 64
End: 2024-04-28
Payer: MEDICARE

## 2024-04-29 ENCOUNTER — HOSPITAL ENCOUNTER (OUTPATIENT)
Facility: CLINIC | Age: 64
End: 2024-04-29
Attending: OPHTHALMOLOGY | Admitting: OPHTHALMOLOGY
Payer: MEDICARE

## 2024-04-29 ENCOUNTER — ANESTHESIA (OUTPATIENT)
Dept: SURGERY | Facility: CLINIC | Age: 64
End: 2024-04-29
Payer: MEDICARE

## 2024-04-29 ENCOUNTER — HOSPITAL ENCOUNTER (OUTPATIENT)
Facility: CLINIC | Age: 64
Discharge: HOME | End: 2024-04-29
Attending: OPHTHALMOLOGY | Admitting: OPHTHALMOLOGY
Payer: MEDICARE

## 2024-04-29 VITALS
HEART RATE: 55 BPM | DIASTOLIC BLOOD PRESSURE: 73 MMHG | HEIGHT: 65 IN | BODY MASS INDEX: 27.11 KG/M2 | TEMPERATURE: 97.2 F | SYSTOLIC BLOOD PRESSURE: 155 MMHG | WEIGHT: 162.7 LBS | OXYGEN SATURATION: 93 % | RESPIRATION RATE: 20 BRPM

## 2024-04-29 DIAGNOSIS — H40.52X2 GLAUCOMA OF LEFT EYE SECONDARY TO OTHER EYE DISORDER, MODERATE STAGE: Primary | Chronic | ICD-10-CM

## 2024-04-29 DIAGNOSIS — Z48.810 AFTERCARE FOLLOWING SURGERY OF A SENSE ORGAN: ICD-10-CM

## 2024-04-29 LAB
HBV SURFACE AG SERPL QL IA: NONREACTIVE
HIV 1+2 AB+HIV1 P24 AG SERPL QL IA: NONREACTIVE
HIV 1+2 AB+HIV1P24 AG SERPLBLD IA.RAPID: NON REACTIVE
HIV 1+2 AB+HIV1P24 AG SERPLBLD IA.RAPID: NON REACTIVE
HIV INTERPRETATION: NORMAL
POTASSIUM SERPL-SCNC: 3.8 MMOL/L (ref 3.4–5.3)

## 2024-04-29 PROCEDURE — 36415 COLL VENOUS BLD VENIPUNCTURE: CPT | Performed by: STUDENT IN AN ORGANIZED HEALTH CARE EDUCATION/TRAINING PROGRAM

## 2024-04-29 PROCEDURE — 250N000009 HC RX 250: Performed by: NURSE ANESTHETIST, CERTIFIED REGISTERED

## 2024-04-29 PROCEDURE — 370N000017 HC ANESTHESIA TECHNICAL FEE, PER MIN: Performed by: OPHTHALMOLOGY

## 2024-04-29 PROCEDURE — 272N000001 HC OR GENERAL SUPPLY STERILE: Performed by: OPHTHALMOLOGY

## 2024-04-29 PROCEDURE — 250N000025 HC SEVOFLURANE, PER MIN: Performed by: OPHTHALMOLOGY

## 2024-04-29 PROCEDURE — 250N000009 HC RX 250: Performed by: OPHTHALMOLOGY

## 2024-04-29 PROCEDURE — 67005 PARTIAL REMOVAL OF EYE FLUID: CPT | Performed by: STUDENT IN AN ORGANIZED HEALTH CARE EDUCATION/TRAINING PROGRAM

## 2024-04-29 PROCEDURE — 250N000011 HC RX IP 250 OP 636: Performed by: NURSE ANESTHETIST, CERTIFIED REGISTERED

## 2024-04-29 PROCEDURE — 67005 PARTIAL REMOVAL OF EYE FLUID: CPT | Mod: LT | Performed by: OPHTHALMOLOGY

## 2024-04-29 PROCEDURE — 999N000141 HC STATISTIC PRE-PROCEDURE NURSING ASSESSMENT: Performed by: OPHTHALMOLOGY

## 2024-04-29 PROCEDURE — 67005 PARTIAL REMOVAL OF EYE FLUID: CPT | Performed by: NURSE ANESTHETIST, CERTIFIED REGISTERED

## 2024-04-29 PROCEDURE — 66825 REPOSITION INTRAOCULAR LENS: CPT | Mod: LT | Performed by: OPHTHALMOLOGY

## 2024-04-29 PROCEDURE — 250N000011 HC RX IP 250 OP 636: Performed by: OPHTHALMOLOGY

## 2024-04-29 PROCEDURE — 258N000003 HC RX IP 258 OP 636: Performed by: NURSE ANESTHETIST, CERTIFIED REGISTERED

## 2024-04-29 PROCEDURE — 360N000077 HC SURGERY LEVEL 4, PER MIN: Performed by: OPHTHALMOLOGY

## 2024-04-29 PROCEDURE — 65815 DRAINAGE OF EYE: CPT | Mod: LT | Performed by: OPHTHALMOLOGY

## 2024-04-29 PROCEDURE — 710N000012 HC RECOVERY PHASE 2, PER MINUTE: Performed by: OPHTHALMOLOGY

## 2024-04-29 PROCEDURE — 999N000104 HC STATISTIC NO CHARGE: Performed by: INTERNAL MEDICINE

## 2024-04-29 PROCEDURE — 84132 ASSAY OF SERUM POTASSIUM: CPT | Performed by: STUDENT IN AN ORGANIZED HEALTH CARE EDUCATION/TRAINING PROGRAM

## 2024-04-29 PROCEDURE — 250N000009 HC RX 250: Performed by: STUDENT IN AN ORGANIZED HEALTH CARE EDUCATION/TRAINING PROGRAM

## 2024-04-29 PROCEDURE — 710N000009 HC RECOVERY PHASE 1, LEVEL 1, PER MIN: Performed by: OPHTHALMOLOGY

## 2024-04-29 RX ORDER — NALOXONE HYDROCHLORIDE 0.4 MG/ML
0.1 INJECTION, SOLUTION INTRAMUSCULAR; INTRAVENOUS; SUBCUTANEOUS
Status: DISCONTINUED | OUTPATIENT
Start: 2024-04-29 | End: 2024-04-29 | Stop reason: HOSPADM

## 2024-04-29 RX ORDER — ONDANSETRON 4 MG/1
4 TABLET, ORALLY DISINTEGRATING ORAL EVERY 30 MIN PRN
Status: DISCONTINUED | OUTPATIENT
Start: 2024-04-29 | End: 2024-04-29 | Stop reason: HOSPADM

## 2024-04-29 RX ORDER — PROPARACAINE HYDROCHLORIDE 5 MG/ML
1 SOLUTION/ DROPS OPHTHALMIC ONCE
Status: DISCONTINUED | OUTPATIENT
Start: 2024-04-29 | End: 2024-04-29 | Stop reason: HOSPADM

## 2024-04-29 RX ORDER — SODIUM CHLORIDE, SODIUM LACTATE, POTASSIUM CHLORIDE, CALCIUM CHLORIDE 600; 310; 30; 20 MG/100ML; MG/100ML; MG/100ML; MG/100ML
INJECTION, SOLUTION INTRAVENOUS CONTINUOUS PRN
Status: DISCONTINUED | OUTPATIENT
Start: 2024-04-29 | End: 2024-04-29

## 2024-04-29 RX ORDER — PROPOFOL 10 MG/ML
INJECTION, EMULSION INTRAVENOUS PRN
Status: DISCONTINUED | OUTPATIENT
Start: 2024-04-29 | End: 2024-04-29

## 2024-04-29 RX ORDER — OFLOXACIN 3 MG/ML
1 SOLUTION/ DROPS OPHTHALMIC 4 TIMES DAILY
Qty: 5 ML | Refills: 0 | Status: SHIPPED | OUTPATIENT
Start: 2024-04-29 | End: 2024-05-28

## 2024-04-29 RX ORDER — HYDROMORPHONE HYDROCHLORIDE 1 MG/ML
0.2 INJECTION, SOLUTION INTRAMUSCULAR; INTRAVENOUS; SUBCUTANEOUS EVERY 5 MIN PRN
Status: DISCONTINUED | OUTPATIENT
Start: 2024-04-29 | End: 2024-04-29 | Stop reason: HOSPADM

## 2024-04-29 RX ORDER — ACETAMINOPHEN 325 MG/1
975 TABLET ORAL
Status: DISCONTINUED | OUTPATIENT
Start: 2024-04-29 | End: 2024-04-29 | Stop reason: HOSPADM

## 2024-04-29 RX ORDER — DICLOFENAC SODIUM 1 MG/ML
1 SOLUTION/ DROPS OPHTHALMIC
Status: DISCONTINUED | OUTPATIENT
Start: 2024-04-29 | End: 2024-04-29 | Stop reason: HOSPADM

## 2024-04-29 RX ORDER — PREDNISOLONE ACETATE 10 MG/ML
1 SUSPENSION/ DROPS OPHTHALMIC 4 TIMES DAILY
Qty: 5 ML | Refills: 1 | Status: SHIPPED | OUTPATIENT
Start: 2024-04-29 | End: 2024-05-28

## 2024-04-29 RX ORDER — OXYCODONE HYDROCHLORIDE 5 MG/1
5 TABLET ORAL
Status: DISCONTINUED | OUTPATIENT
Start: 2024-04-29 | End: 2024-04-29 | Stop reason: HOSPADM

## 2024-04-29 RX ORDER — MOXIFLOXACIN 5 MG/ML
1 SOLUTION/ DROPS OPHTHALMIC
Status: DISCONTINUED | OUTPATIENT
Start: 2024-04-29 | End: 2024-04-29 | Stop reason: HOSPADM

## 2024-04-29 RX ORDER — MOXIFLOXACIN 5 MG/ML
SOLUTION/ DROPS OPHTHALMIC PRN
Status: DISCONTINUED | OUTPATIENT
Start: 2024-04-29 | End: 2024-04-29 | Stop reason: HOSPADM

## 2024-04-29 RX ORDER — CYCLOPENTOLAT/TROPIC/PHENYLEPH 1%-1%-2.5%
1 DROPS (EA) OPHTHALMIC (EYE)
Status: DISCONTINUED | OUTPATIENT
Start: 2024-04-29 | End: 2024-04-29 | Stop reason: HOSPADM

## 2024-04-29 RX ORDER — ONDANSETRON 2 MG/ML
4 INJECTION INTRAMUSCULAR; INTRAVENOUS EVERY 30 MIN PRN
Status: DISCONTINUED | OUTPATIENT
Start: 2024-04-29 | End: 2024-04-29 | Stop reason: HOSPADM

## 2024-04-29 RX ORDER — PROPOFOL 10 MG/ML
INJECTION, EMULSION INTRAVENOUS CONTINUOUS PRN
Status: DISCONTINUED | OUTPATIENT
Start: 2024-04-29 | End: 2024-04-29

## 2024-04-29 RX ORDER — LABETALOL HYDROCHLORIDE 5 MG/ML
10 INJECTION, SOLUTION INTRAVENOUS
Status: DISCONTINUED | OUTPATIENT
Start: 2024-04-29 | End: 2024-04-29 | Stop reason: HOSPADM

## 2024-04-29 RX ORDER — PROPARACAINE HYDROCHLORIDE 5 MG/ML
1 SOLUTION/ DROPS OPHTHALMIC ONCE
Status: COMPLETED | OUTPATIENT
Start: 2024-04-29 | End: 2024-04-29

## 2024-04-29 RX ORDER — ONDANSETRON 2 MG/ML
INJECTION INTRAMUSCULAR; INTRAVENOUS PRN
Status: DISCONTINUED | OUTPATIENT
Start: 2024-04-29 | End: 2024-04-29

## 2024-04-29 RX ORDER — BALANCED SALT SOLUTION 6.4; .75; .48; .3; 3.9; 1.7 MG/ML; MG/ML; MG/ML; MG/ML; MG/ML; MG/ML
SOLUTION OPHTHALMIC PRN
Status: DISCONTINUED | OUTPATIENT
Start: 2024-04-29 | End: 2024-04-29 | Stop reason: HOSPADM

## 2024-04-29 RX ORDER — DEXAMETHASONE SODIUM PHOSPHATE 4 MG/ML
INJECTION, SOLUTION INTRA-ARTICULAR; INTRALESIONAL; INTRAMUSCULAR; INTRAVENOUS; SOFT TISSUE PRN
Status: DISCONTINUED | OUTPATIENT
Start: 2024-04-29 | End: 2024-04-29

## 2024-04-29 RX ORDER — SODIUM CHLORIDE, SODIUM LACTATE, POTASSIUM CHLORIDE, CALCIUM CHLORIDE 600; 310; 30; 20 MG/100ML; MG/100ML; MG/100ML; MG/100ML
INJECTION, SOLUTION INTRAVENOUS CONTINUOUS
Status: DISCONTINUED | OUTPATIENT
Start: 2024-04-29 | End: 2024-04-29 | Stop reason: HOSPADM

## 2024-04-29 RX ORDER — LIDOCAINE HYDROCHLORIDE 20 MG/ML
INJECTION, SOLUTION INFILTRATION; PERINEURAL PRN
Status: DISCONTINUED | OUTPATIENT
Start: 2024-04-29 | End: 2024-04-29

## 2024-04-29 RX ORDER — FENTANYL CITRATE 50 UG/ML
25 INJECTION, SOLUTION INTRAMUSCULAR; INTRAVENOUS EVERY 5 MIN PRN
Status: DISCONTINUED | OUTPATIENT
Start: 2024-04-29 | End: 2024-04-29 | Stop reason: HOSPADM

## 2024-04-29 RX ADMIN — Medication 25 MG: at 12:56

## 2024-04-29 RX ADMIN — PROPOFOL 100 MG: 10 INJECTION, EMULSION INTRAVENOUS at 12:52

## 2024-04-29 RX ADMIN — LIDOCAINE HYDROCHLORIDE 70 MG: 20 INJECTION, SOLUTION INFILTRATION; PERINEURAL at 12:52

## 2024-04-29 RX ADMIN — SUGAMMADEX 200 MG: 100 INJECTION, SOLUTION INTRAVENOUS at 14:03

## 2024-04-29 RX ADMIN — PROPOFOL 50 MCG/KG/MIN: 10 INJECTION, EMULSION INTRAVENOUS at 13:04

## 2024-04-29 RX ADMIN — Medication 1 DROP: at 10:59

## 2024-04-29 RX ADMIN — DEXAMETHASONE SODIUM PHOSPHATE 4 MG: 4 INJECTION, SOLUTION INTRA-ARTICULAR; INTRALESIONAL; INTRAMUSCULAR; INTRAVENOUS; SOFT TISSUE at 12:52

## 2024-04-29 RX ADMIN — PROPARACAINE HYDROCHLORIDE 1 DROP: 5 SOLUTION/ DROPS OPHTHALMIC at 10:58

## 2024-04-29 RX ADMIN — Medication 1 DROP: at 11:26

## 2024-04-29 RX ADMIN — Medication 50 MG: at 12:52

## 2024-04-29 RX ADMIN — SODIUM CHLORIDE, POTASSIUM CHLORIDE, SODIUM LACTATE AND CALCIUM CHLORIDE: 600; 310; 30; 20 INJECTION, SOLUTION INTRAVENOUS at 12:40

## 2024-04-29 RX ADMIN — ONDANSETRON 4 MG: 2 INJECTION INTRAMUSCULAR; INTRAVENOUS at 13:59

## 2024-04-29 ASSESSMENT — ACTIVITIES OF DAILY LIVING (ADL)
ADLS_ACUITY_SCORE: 27
ADLS_ACUITY_SCORE: 25
ADLS_ACUITY_SCORE: 25
ADLS_ACUITY_SCORE: 27

## 2024-04-29 NOTE — ANESTHESIA CARE TRANSFER NOTE
Patient: Bright Lockett    Procedure: Procedure(s):  LEFT REPLACEMENT, INTRAOCULA Repositioning, INTRAOCULAR LENS IMPLANT + Anterior Chamber washout, possible lens removal RIGHT LENS IMPLANT + Anterior Chamber washout,       Diagnosis: Hyphema of left eye [H21.02]  Subluxation of left lens [H27.112]  Diagnosis Additional Information: No value filed.    Anesthesia Type:   General     Note:    Oropharynx: spontaneously breathing  Level of Consciousness: awake  Oxygen Supplementation: face mask  Level of Supplemental Oxygen (L/min / FiO2): 8  Independent Airway: airway patency satisfactory and stable  Dentition: dentition unchanged  Vital Signs Stable: post-procedure vital signs reviewed and stable    Patient transferred to: PACU    Handoff Report: Identifed the Patient, Identified the Reponsible Provider, Reviewed the pertinent medical history, Discussed the surgical course, Reviewed Intra-OP anesthesia mangement and issues during anesthesia, Set expectations for post-procedure period and Allowed opportunity for questions and acknowledgement of understanding      Vitals:  Vitals Value Taken Time   /74 04/29/24 1418   Temp     Pulse 65 04/29/24 1423   Resp 27 04/29/24 1423   SpO2 96 % 04/29/24 1423   Vitals shown include unfiled device data.    Electronically Signed By: ALIE Smith CRNA  April 29, 2024  2:24 PM

## 2024-04-29 NOTE — ANESTHESIA PREPROCEDURE EVALUATION
Anesthesia Pre-Procedure Evaluation    Patient: Bright Lockett   MRN: [id number] : 1960        Procedure : Procedure(s):  LEFT REPLACEMENT, INTRAOCULA Repositioning, INTRAOCULAR LENS IMPLANT + AC washout, possible lens removal RIGHT  LENS IMPLANT + AC washout,          Past Medical History:   Diagnosis Date    Aortic sclerosis     Back pain     Benign neoplasm of adenomatous polyp     of the large intestine    Carpal tunnel syndrome     Cerebral palsy (H)     Diverticulosis     Esophageal reflux     Glaucoma     Hyperlipidemia     Hypertension     Left ventricular hypertrophy     Leg weakness     left    Neuritis     Osteoarthritis of knee       Past Surgical History:   Procedure Laterality Date    IMPLANT VALVE EYE Left 2023    Procedure: INSERTION, GLAUCOMA SHUNT IMPLANT, EYE - Left;  Surgeon: Flori Hamilton MD;  Location: [facility] OR    PHACOEMULSIFICATION WITH STANDARD INTRAOCULAR LENS IMPLANT Left 2023    Procedure: LEFT - Phacoemulsificaiton  CATARACT,  WITH INTRAOCULAR LENS IMPLANT INSERTION, CTR, iridectomy;  Surgeon: Dre Layton MD;  Location: [facility] OR    [facility] EXPLORATORY OF ABDOMEN      Description: Exploratory Laparotomy;  Recorded: 2008;  Comments: small bowel obstruction      Allergies   Allergen Reactions    Morphine Nausea and Vomiting      Social History     Tobacco Use    Smoking status: Never     Passive exposure: Never    Smokeless tobacco: Never   Substance Use Topics    Alcohol use: No      Wt Readings from Last 1 Encounters:   24 74.2 kg (163 lb 8 oz)        Anesthesia Evaluation   Pt has had prior anesthetic. Type: General.    No history of anesthetic complications       ROS/MED HX  ENT/Pulmonary: Comment: Hx of Right Eye enucleation      Neurologic: Comment: Cerebral Palsy, slow speech, motor function has gotten weaker with age. Can walk with crutches    (+)                         Developmental delay,       Cardiovascular: Comment: Hx of Aortic  Sclerosis.    (+) Dyslipidemia hypertension- -   -  - -      CHF etiology: diastolic dysfunction                          Previous cardiac testing   Echo: Date: 05/28/2021 Results:  Narrative & Impression  1. Normal left ventricular size and systolic performance with a visually estimated ejection fraction of 60%.   2. There is mild to moderate concentric increase in left ventricular wall thickness.   3. There is mild aortic insufficiency.   4. Normal right ventricular size and systolic performance.   5. There is mild left atrial enlargement.     Stress Test:  Date: Results:    ECG Reviewed:  Date: 06/26/2019 Results:    Sinus tachycardia  Possible Left atrial enlargement  ST & T wave abnormality, consider inferior ischemia  ST & T wave abnormality, consider anterolateral ischemia  Abnormal ECG  When compared with ECG of 24-JUL-2013 12:53,  ST depression is less in anterolateral leads  T wave inversion less evident in Lateral leads      Cath:  Date: Results:      METS/Exercise Tolerance:     Hematologic:    (-) anemia   Musculoskeletal: Comment: Osteoarthritis of left knee and chronic low back pain.  (+)  arthritis,             GI/Hepatic: Comment: Hx of Diverticulosis and colonic polyps    (+) GERD, Asymptomatic on medication,                  Renal/Genitourinary: Comment: Stage 3 Chronic Renal Disease. BPH    (+) renal disease,  Pt does not require dialysis,           Endo:       Psychiatric/Substance Use:       Infectious Disease:       Malignancy:       Other:            Physical Exam    Airway        Mallampati: II   TM distance: > 3 FB   Neck ROM: full   Mouth opening: > 3 cm    Respiratory Devices and Support         Dental       (+) Modest Abnormalities - crowns, retainers, 1 or 2 missing teeth      Cardiovascular          Rhythm and rate: regular and normal   (+) murmur (systolic)       Pulmonary   pulmonary exam normal        breath sounds clear to auscultation       Other findings: Weakness, slow  "speech    OUTSIDE LABS:  CBC:   Lab Results   Component Value Date    WBC 6.1 10/10/2023    WBC 7.1 07/04/2020    HGB 17.4 04/23/2024    HGB 16.9 12/05/2023    HCT 50.0 10/10/2023    HCT 49.6 07/04/2020     10/10/2023     07/04/2020     BMP:   Lab Results   Component Value Date     04/23/2024     12/05/2023    POTASSIUM 4.2 04/23/2024    POTASSIUM 4.1 12/05/2023    CHLORIDE 102 04/23/2024    CHLORIDE 104 12/05/2023    CO2 27 04/23/2024    CO2 26 12/05/2023    BUN 25.9 (H) 04/23/2024    BUN 33.3 (H) 12/05/2023    CR 1.12 04/23/2024    CR 0.98 12/05/2023    GLC 72 04/23/2024     (H) 12/05/2023     COAGS: No results found for: \"PTT\", \"INR\", \"FIBR\"  POC: No results found for: \"BGM\", \"HCG\", \"HCGS\"  HEPATIC:   Lab Results   Component Value Date    ALBUMIN 4.3 04/23/2024    PROTTOTAL 7.7 04/23/2024    ALT 15 04/23/2024    AST 19 04/23/2024    ALKPHOS 77 04/23/2024    BILITOTAL 1.1 04/23/2024     OTHER:   Lab Results   Component Value Date    A1C 5.9 (H) 04/23/2024    RAMY 9.6 04/23/2024    TSH 2.39 10/10/2023    CRP 2.3 (H) 07/04/2020    SED 5 07/04/2020       Anesthesia Plan    ASA Status:  3    NPO Status:  NPO Appropriate    Anesthesia Type: General.     - Airway: ETT   Induction: Intravenous.   Maintenance: Balanced.   Techniques and Equipment:     - Airway: Video-Laryngoscope       Consents    Anesthesia Plan(s) and associated risks, benefits, and realistic alternatives discussed. Questions answered and patient/representative(s) expressed understanding.     - Discussed:     - Discussed with:  Patient (sister)      - Extended Intubation/Ventilatory Support Discussed: No.      - Patient is DNR/DNI Status: No     Use of blood products discussed: No .     Postoperative Care    Pain management: Multi-modal analgesia, Oral pain medications.   PONV prophylaxis: Dexamethasone or Solumedrol, Ondansetron (or other 5HT-3)     Comments:    Other Comments: Discussed risks of anesthesia including " nausea, vomiting, sore throat, dental damage, confusion, cardiopulmonary complications, agitation, neurologic complications, and serious complications.               Lynn Romano MD    I have reviewed the pertinent notes and labs in the chart from the past 30 days and (re)examined the patient.  Any updates or changes from those notes are reflected in this note.

## 2024-04-29 NOTE — ANESTHESIA POSTPROCEDURE EVALUATION
Patient: Bright Lockett    Procedure: Procedure(s):  LEFT REPLACEMENT, INTRAOCULA Repositioning, INTRAOCULAR LENS IMPLANT + Anterior Chamber washout, possible lens removal RIGHT LENS IMPLANT + Anterior Chamber washout,       Anesthesia Type:  General    Note:  Disposition: Outpatient   Postop Pain Control: Uneventful            Sign Out: Well controlled pain   PONV: No   Neuro/Psych: Uneventful            Sign Out: Acceptable/Baseline neuro status   Airway/Respiratory: Uneventful            Sign Out: Acceptable/Baseline resp. status; O2 supplementation               Oxygen: Nasal Cannula (1L)   CV/Hemodynamics: Uneventful            Sign Out: Acceptable CV status; No obvious hypovolemia; No obvious fluid overload   Other NRE: NONE   DID A NON-ROUTINE EVENT OCCUR? No    Event details/Postop Comments:  Tolerating PO. Bright denies nausea, reports discomfort is manageable. He denies questions re anesthesia.           Last vitals:  Vitals:    04/29/24 1430 04/29/24 1445 04/29/24 1500   BP: 132/68 (!) 146/72 (!) 140/80   Pulse: 55 (!) 49 58   Resp: 22 20 21   Temp:      SpO2: 91% 95% 96%       Electronically Signed By: Lynn Romano MD  April 29, 2024  3:14 PM

## 2024-04-29 NOTE — DISCHARGE INSTRUCTIONS
"POST-OPERATIVE INSTRUCTIONS FOLLOWING SURGERY    Dre Hager MD, PhD  Department of Ophthalmology  [facility]  [phone]    FOLLOW UP:  You have a follow up appointment tomorrow at the Eye Clinic in the [facility] 9th floor.      EYE DROPS  Drops will be given to you after the surgery.  They DO NOT need to be used until after you are seen in clinic.  DO NOT disturb your bandage the first night.      When using more than one drop, separate them by 3 minutes between drops.  Common times to place drops are breakfast, lunch, dinner, and bedtime.  Do not stop your drops without discussing with our office.  If you run out before your appointment, call and we will send in a refill.    Ofloxacin (tan top) --- 4 times per day  Pred Forte (prednisolone acetate) --- 4 times per day    ACTIVITY:  No heavy lifting after surgery  Keep the bandage in place until you are seen tomorrow   Do not get your bandage wet      PAIN MEDICATION   It is common to have some mild or moderate discomfort after eye surgery.  Tylenol or ibuprofen may be taken if you don't have any other general health conditions that prevent you from taking these.      WHAT TO EXPECT  It is common for the eye to to have a blood tinged discharge for a few days after surgery  It may feel irritated (as if something were in your eye), for there to be clear discharge (thicker in the mornings upon awakening), and for it to be bloodshot for 2-3 weeks following surgery.     WHAT TO WATCH OUT FOR  If you experience any of the following \"RSVP Symptoms\", you should call immediately:  Worsening Redness  Worsening Sensitivity to light  Worsening Vision, including new flashing lights or floaters  Worsening Pain, including nausea/vomiting      For any of the symptoms listed above, or for other concerns, call [phone] and ask to speak to the clinic nurse.  If you call after hours, follow to prompts to reach the doctor on " call.    Same-Day Surgery   Adult Discharge Orders & Instructions     For 24 hours after surgery:  Get plenty of rest.  A responsible adult must stay with you for at least 24 hours after you leave the hospital.   Pain medication can slow your reflexes. Do not drive or use heavy equipment.  If you have weakness or tingling, don't drive or use heavy equipment until this feeling goes away.  Mixing alcohol and pain medication can cause dizziness and slow your breathing. It can even be fatal. Do not drink alcohol while taking pain medication.  Avoid strenuous or risky activities.  Ask for help when climbing stairs.   You may feel lightheaded.  If so, sit for a few minutes before standing.  Have someone help you get up.   If you have nausea (feel sick to your stomach), drink only clear liquids such as apple juice, ginger ale, broth or 7-Up.  Rest may also help.  Be sure to drink enough fluids.  Move to a regular diet as you feel able. Take pain medications with a small amount of solid food, such as toast or crackers, to avoid nausea.   A slight fever is normal. Call the doctor if your fever is over 100 F (37.7 C) (taken under the tongue) or lasts longer than 24 hours.  You may have a dry mouth, muscle aches, trouble sleeping or a sore throat.  These symptoms should go away after 24 hours.  Do not make important or legal decisions.   Pain Management:      1. Take pain medication (if prescribed) for pain as directed by your physician.        2. WARNING: If the pain medication you have been prescribed contains Tylenol  (acetaminophen), DO NOT take additional doses of Tylenol (acetaminophen).     Call your doctor for any of the followin.  Signs of infection (fever, growing tenderness at the surgery site, severe pain, a large amount of drainage or bleeding, foul-smelling drainage, redness, swelling).    2.  It has been over 8 to 10 hours since surgery and you are still not able to urinate (pee).    3.  Headache for over  24 hours.    4.  Numbness, tingling or weakness the day after surgery (if you had spinal anesthesia).  To contact a doctor, call Dr. Yepez (Madan), Ophthalmology [phone] or:  '   [phone] and ask for the Resident On Call for:         Ophthalmology (answered 24 hours a day)  '   Emergency Department:  [place] [facility]: [phone]  [place] [facility]: [phone]

## 2024-04-29 NOTE — OP NOTE
SURGEON: Dre Hager MD, PhD  ASSISTANT SURGEON: Emmett Gordon MD, PhD    PREOPERATIVE DIAGNOSIS: Reigers anomaly,  pseudophakia, malignant glaucoma, hyphema OS  POSTOPERATIVE DIAGNOSIS: same  PROCEDURE: AC washout, AC reformation, IOL repositioning, Anterior Vitrectomy OS    ANESTHESIA: general anesthesia   COMPLICATIONS: none    Indication: Bright Lockett is a 64 year old patient with history as above here for surgical management.    DESCRIPTION OF THE PROCEDURE:  The patient was taken to the operative room where general anesthesia and topical anesthesia were given     The operative eye was prepped and draped in the usual sterile surgical fashion for ophthalmic surgery, including the installation of one drop of 5% Povidone Iodine.  A sterile drape was placed over the face and body and a lid speculum was inserted.      With the use of a Supersharp blade , a paracentesis was created at the limbus around 2:00 and 4:00. Air bubble and viscoelastic was placed in the anterior chamber using a canula.  A lewicky canula was placed. Vitrectomy probe was used to break membranous adhesions between the lens and cornea and clear out heme. The heme seemed to be within the corneal stroma anterior to the lens or within the detached descemet's membrane. After removing all adhesions, since IOL did not move posteriorly, I decided that this is probably a aqueous misdirection mechanism and decided to do an anterior vitrectomy. The vitrector was introduced through the zonules temporal to the lens and an anterior vitrectomy was performed to break the anterior hyaloid face. The AC deepened immediately and the lens moved back. During all these procedures, cornea became extremely edematous.     The wounds were closed with one 10-0 nylon each, hydrated and were watertight.  Intracemeral moxifloxacin was injected.     The lid speculum was removed. The eye was cleaned with wet and dry gauze. A clear shield were placed over the eye.  The  patient was discharge in stable condition having tolerated the procedure well.    The surgery was assisted by Dr. Emmett Gordon, because no qualified resident was available on the day of the surgery. Due to the delicate and complex nature of this surgery, Dr. Gordon was required. Dr. Gordon assisted with the entire procedure. I was present for the entire surgery.    Dre Hager MD, PhD

## 2024-04-29 NOTE — ANESTHESIA PROCEDURE NOTES
Airway       Patient location during procedure: OR       Procedure Start/Stop Times: 4/29/2024 12:59 PM  Staff -        Anesthesiologist:  Lynn Romano MD       CRNA: Alan Tristan APRN CRNA       Performed By: CRNA  Consent for Airway        Urgency: elective  Indications and Patient Condition       Indications for airway management: reilly-procedural       Induction type:intravenous       Mask difficulty assessment: 1 - vent by mask    Final Airway Details       Final airway type: endotracheal airway       Successful airway: ETT - single  Endotracheal Airway Details        ETT size (mm): 7.5       Cuffed: yes       Cuff volume (mL): 25       Successful intubation technique: video laryngoscopy       VL Blade Size: MAC 3       Grade View of Cords: 1       Adjucts: stylet       Position: Right       Measured from: gums/teeth       Secured at (cm): 21       Bite block used: None    Post intubation assessment        Placement verified by: capnometry, equal breath sounds and chest rise        Number of attempts at approach: 1       Number of other approaches attempted: 0       Secured with: tape       Ease of procedure: easy       Dentition: Intact and Unchanged    Medication(s) Administered   Medication Administration Time: 4/29/2024 12:59 PM

## 2024-04-29 NOTE — BRIEF OP NOTE
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  Complications: None.  Implants: * No implants in log *

## 2024-04-29 NOTE — TELEPHONE ENCOUNTER
Patient's sister, Rachele, called requesting address for surgery, confirmed address with patient's sister.  Louise Ye on 4/29/2024 at 8:17 AM

## 2024-04-30 ENCOUNTER — OFFICE VISIT (OUTPATIENT)
Dept: OPHTHALMOLOGY | Facility: CLINIC | Age: 64
End: 2024-04-30
Attending: OPHTHALMOLOGY
Payer: MEDICARE

## 2024-04-30 DIAGNOSIS — H40.9 ADVANCED STAGE GLAUCOMA: Primary | ICD-10-CM

## 2024-04-30 DIAGNOSIS — Q13.81: ICD-10-CM

## 2024-04-30 DIAGNOSIS — Z98.890 POSTOPERATIVE EYE STATE: ICD-10-CM

## 2024-04-30 LAB — HCV RNA SERPL NAA+PROBE-ACNC: NOT DETECTED IU/ML

## 2024-04-30 PROCEDURE — G0463 HOSPITAL OUTPT CLINIC VISIT: HCPCS | Performed by: OPHTHALMOLOGY

## 2024-04-30 PROCEDURE — 99024 POSTOP FOLLOW-UP VISIT: CPT | Performed by: OPHTHALMOLOGY

## 2024-04-30 ASSESSMENT — TONOMETRY
OD_IOP_MMHG: --
OS_IOP_MMHG: 06
IOP_METHOD: ICARE

## 2024-04-30 ASSESSMENT — VISUAL ACUITY
METHOD: SNELLEN - LINEAR
OD_SC: PROS
OS_SC: HM

## 2024-04-30 ASSESSMENT — CONF VISUAL FIELD
OS_INFERIOR_TEMPORAL_RESTRICTION: 1
OD_SUPERIOR_TEMPORAL_RESTRICTION: 1
OD_SUPERIOR_NASAL_RESTRICTION: 1
OS_SUPERIOR_TEMPORAL_RESTRICTION: 1
OS_INFERIOR_NASAL_RESTRICTION: 1
OD_INFERIOR_TEMPORAL_RESTRICTION: 1
OD_INFERIOR_NASAL_RESTRICTION: 1
OS_SUPERIOR_NASAL_RESTRICTION: 1

## 2024-04-30 ASSESSMENT — SLIT LAMP EXAM - LIDS: COMMENTS: NORMAL

## 2024-04-30 NOTE — PROGRESS NOTES
Postoperative day 1 status post left eye IOL repositioning, AC washout, ant vitrectomy 4/29/24    Slept well  Retina attached  Doing well    Plan:  No heavy lifting   Shield at night  Retina detachment and endophthalmitis precautions were discussed with the patient and was asked to return if any of the those occur    Medications to operative eye    Prednisolone (white or pink top) 4/day right eye   Ofloxacin (tan top) 4/day right eye     Follow-up next week for VT left eye and visit with Dr. Archibald    Complete documentation of historical and exam elements from today's encounter can be found in the full encounter summary report (not reduplicated in this progress note). I personally obtained the chief complaint(s) and history of present illness.  I confirmed and edited as necessary the review of systems, past medical/surgical history, family history, social history, and examination findings as documented by others; and I examined the patient myself. I personally reviewed the relevant tests, images, and reports as documented above. I formulated and edited as necessary the assessment and plan and discussed the findings and management plan with the patient and family.    Dre Hager MD  , Vitreoretinal surgery   Department of Ophthalmology  [facility]

## 2024-04-30 NOTE — NURSING NOTE
Chief Complaints and History of Present Illnesses   Patient presents with    Post Op (Ophthalmology) Left Eye     POD#1  s/p Intraocular Repositioning + Anterior Chamber washout, Vitrectomy anterior Left Eye  04/29/2024          Chief Complaint(s) and History of Present Illness(es)       Post Op (Ophthalmology) Left Eye              Laterality: left eye    Comments: POD#1  s/p Intraocular Repositioning + Anterior Chamber washout, Vitrectomy anterior Left Eye  04/29/2024                   Comments    Pt slept well last night. No eye pain yesterday or today, just some irritation from eye patch.  No DM.    BRENDA Ledesma April 30, 2024 9:03 AM

## 2024-05-01 ENCOUNTER — MYC MEDICAL ADVICE (OUTPATIENT)
Dept: OPHTHALMOLOGY | Facility: CLINIC | Age: 64
End: 2024-05-01
Payer: MEDICARE

## 2024-05-01 ENCOUNTER — TELEPHONE (OUTPATIENT)
Dept: FAMILY MEDICINE | Facility: CLINIC | Age: 64
End: 2024-05-01

## 2024-05-01 DIAGNOSIS — I10 ESSENTIAL HYPERTENSION: ICD-10-CM

## 2024-05-01 DIAGNOSIS — E26.9 HYPERALDOSTERONISM (H): Primary | ICD-10-CM

## 2024-05-01 PROBLEM — R79.89 ABNORMAL ALDOSTERONE TO RENIN RATIO: Status: ACTIVE | Noted: 2024-05-01

## 2024-05-01 LAB — ALDOST/RENIN PLAS-RTO: 74 {RATIO} (ref 0–25)

## 2024-05-01 RX ORDER — SPIRONOLACTONE 25 MG/1
25 TABLET ORAL DAILY
Qty: 90 TABLET | Refills: 3 | Status: SHIPPED | OUTPATIENT
Start: 2024-05-01

## 2024-05-01 NOTE — TELEPHONE ENCOUNTER
----- Message from Ladarius Soria MD sent at 5/1/2024 10:30 AM CDT -----  Please contact patient and make sure he gets the message on the MyChart comment.  He has bad eyes and may want you to help read it/understand       Peter,  We checked a hormone in your blood that can cause high blood pressure and low potassium.  That hormone is called aldosterone.  It is quite high.  2 things to do  Start spironolactone, a week diuretic that blocks the effects of that hormone and can help treat blood pressure better.  We should follow-up your blood pressure in about a month.     Second, we should have you see an endocrinologist.  Every now and then there are benign tumors that excrete that hormone.  The endocrinologist can help us know if we need to look for those.   W   Ladarius Soria MD  5/1/2024 10:30 AM CDT Back to Top      Oh and let him know that I called in spironolactone to start taking once a day.   ritten by Ladarius Soria MD on 5/1/2024 10:30 AM CDT

## 2024-05-01 NOTE — TELEPHONE ENCOUNTER
Called pt. He reported no change in vision, pain, or worsening redness. Only noting 3 individual floaters in his vision. I recommended he monitor these and let us know if he experienced worsening pain, vision, redness, curtains or flashing lights. He mentioned he would do so.

## 2024-05-02 NOTE — TELEPHONE ENCOUNTER
First attempt: Left voicemail for patient requesting return call to clinic. Please relay message from Dr. Soria if patient calls back.    Yun Steiner RN  [facility]

## 2024-05-03 ENCOUNTER — TELEPHONE (OUTPATIENT)
Dept: FAMILY MEDICINE | Facility: CLINIC | Age: 64
End: 2024-05-03
Payer: MEDICARE

## 2024-05-03 NOTE — TELEPHONE ENCOUNTER
Centralized scheduling call to ask if we could relay the call from yesterday regarding pt RX question to room mate of pt. Room mate does have consent to communicate on file.

## 2024-05-03 NOTE — TELEPHONE ENCOUNTER
Discussed results with patient. He verbalized understanding.     Patient inquiring if he is to remain on his furosemide 20 mg considering a second diuretic was added. Instructed him to continue taking all medications as prescribed until we hear back as Dr. Soria did not indicate he was to discontinue any medications.   It does not appear that endocrine referral has been placed--pended.  *He'd like his sister Leeanne to be called when referral is placed to coordinate scheduling of this, as she is his primary means of transportation

## 2024-05-08 ENCOUNTER — OFFICE VISIT (OUTPATIENT)
Dept: OPHTHALMOLOGY | Facility: CLINIC | Age: 64
End: 2024-05-08
Attending: OPHTHALMOLOGY
Payer: MEDICARE

## 2024-05-08 DIAGNOSIS — Z96.1 PSEUDOPHAKIA OF LEFT EYE: ICD-10-CM

## 2024-05-08 DIAGNOSIS — H40.9 ADVANCED STAGE GLAUCOMA: ICD-10-CM

## 2024-05-08 DIAGNOSIS — H40.9 ADVANCED STAGE GLAUCOMA: Primary | ICD-10-CM

## 2024-05-08 DIAGNOSIS — H40.52X3 GLAUCOMA SECONDARY TO OTHER EYE DISORDERS, LEFT EYE, SEVERE STAGE: ICD-10-CM

## 2024-05-08 DIAGNOSIS — Q13.81: Primary | ICD-10-CM

## 2024-05-08 DIAGNOSIS — H34.8322 STABLE BRANCH RETINAL VEIN OCCLUSION OF LEFT EYE (H): ICD-10-CM

## 2024-05-08 DIAGNOSIS — Q13.81: ICD-10-CM

## 2024-05-08 PROCEDURE — 99207 OCT ANTERIOR SEGMENT SPECTRALIS OS (LEFT EYE): CPT | Mod: 26 | Performed by: OPHTHALMOLOGY

## 2024-05-08 PROCEDURE — 99024 POSTOP FOLLOW-UP VISIT: CPT | Performed by: OPHTHALMOLOGY

## 2024-05-08 PROCEDURE — 99024 POSTOP FOLLOW-UP VISIT: CPT | Mod: GC | Performed by: OPHTHALMOLOGY

## 2024-05-08 PROCEDURE — 999N000103 HC STATISTIC NO CHARGE FACILITY FEE

## 2024-05-08 PROCEDURE — G0463 HOSPITAL OUTPT CLINIC VISIT: HCPCS | Performed by: OPHTHALMOLOGY

## 2024-05-08 PROCEDURE — 92132 CPTRZD OPH DX IMG ANT SGM: CPT | Performed by: OPHTHALMOLOGY

## 2024-05-08 PROCEDURE — G0463 HOSPITAL OUTPT CLINIC VISIT: HCPCS | Mod: 27 | Performed by: OPHTHALMOLOGY

## 2024-05-08 ASSESSMENT — TONOMETRY
OD_IOP_MMHG: PROS
IOP_METHOD: ICARE
IOP_METHOD: ICARE
OD_IOP_MMHG: PROS
OS_IOP_MMHG: 08
OS_IOP_MMHG: 08

## 2024-05-08 ASSESSMENT — VISUAL ACUITY
OS_SC: LP
METHOD: SNELLEN - LINEAR
OD_SC: PROS
OS_SC: LP
METHOD: SNELLEN - LINEAR
OD_SC: PROS

## 2024-05-08 ASSESSMENT — SLIT LAMP EXAM - LIDS
COMMENTS: NORMAL
COMMENTS: NORMAL

## 2024-05-08 NOTE — PROGRESS NOTES
Postoperative week 1 status post left eye IOL repositioning, AC washout, ant vitrectomy 4/29/24    Vision is the same  Cornea edema slightly better  AC deep with possible detached descemet versus fibrin membrane in AC in front of the IOL  PC IOL in pupillary plane  Retina appears attached (superior hemiretinal RVO was noted in previous exams when cornea was more clear)  Doing well    Plan:  No heavy lifting   Shield at night  Retina detachment and endophthalmitis precautions were discussed with the patient and was asked to return if any of the those occur    Medications to operative eye    Continue Prednisolone (white or pink top) 4/day right eye   Stop Ofloxacin (tan top)    Follow-up 3 weeks with me and see Dr. Archibald today    Complete documentation of historical and exam elements from today's encounter can be found in the full encounter summary report (not reduplicated in this progress note). I personally obtained the chief complaint(s) and history of present illness.  I confirmed and edited as necessary the review of systems, past medical/surgical history, family history, social history, and examination findings as documented by others; and I examined the patient myself. I personally reviewed the relevant tests, images, and reports as documented above. I formulated and edited as necessary the assessment and plan and discussed the findings and management plan with the patient and family.    Dre Hager MD  , Vitreoretinal surgery   Department of Ophthalmology  [facility]

## 2024-05-08 NOTE — Clinical Note
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 I might end up needing to do a PK. The AC is shallow with a tube in there that might complicate things. Will keep you posted as I see him in 2 weeks  wassef

## 2024-05-08 NOTE — PROGRESS NOTES
"Chief complaint   Post-surgical evaluation    HPI    Bright Lockett 64 year old male presents for evaluation      Chief Complaint(s) and History of Present Illness(es)       Corneal Edema Evaluation    In left eye.  Pain was noted as 0/10.             Comments    Referred by Dr. Hager for K Edema left eye     status post left eye IOL repositioning, AC washout, ant vitrectomy 4/29/24 The floaters he sent a my chart message about on 5/1 have dissipated He notes the \"dead spot\" in his vision of his left eye is still present, he feels the clarity of his vision in his left eye has diminished some. He doesn't see any improvement and considers this a half step back.     Prednisolone (white or pink top) 4/day LEFT eye   Ofloxacin (tan top) 4/day LEFT eye     ALEK Charles 12:13 PM 05/08/2024                        Past ocular history   Prior eye surgery/laser/Trauma:   S/P left eye CE IOL - zonulysis was present, CTR was placed (Madan) + Glaucoma ahmed tube shunt (Alfonso) 12/18/23   S/p left eye AC washout and vitrectomy 4/29/24.       PMH     Past Medical History:   Diagnosis Date    Aortic sclerosis     Back pain     Benign neoplasm of adenomatous polyp     of the large intestine    Carpal tunnel syndrome     Cerebral palsy (H)     Diverticulosis     Esophageal reflux     Glaucoma     Hyperlipidemia     Hypertension     Left ventricular hypertrophy     Leg weakness     left    Neuritis     Osteoarthritis of knee        PSH     Past Surgical History:   Procedure Laterality Date    IMPLANT VALVE EYE Left 12/18/2023    Procedure: INSERTION, GLAUCOMA SHUNT IMPLANT, EYE - Left;  Surgeon: Flori Hamilton MD;  Location: [facility] OR    PHACOEMULSIFICATION WITH STANDARD INTRAOCULAR LENS IMPLANT Left 12/18/2023    Procedure: LEFT - Phacoemulsificaiton  CATARACT,  WITH INTRAOCULAR LENS IMPLANT INSERTION, CTR, iridectomy;  Surgeon: Dre Layton MD;  Location: [facility] OR    REPOSITION INTRAOCULAR LENS Left 4/29/2024 "    Procedure: LEFT INTRAOCULAR REPOSITIONING, ANTERIOR CHAMBER REFORMATION;  Surgeon: Dre Layton MD;  Location: [facility] OR    VITRECTOMY ANTERIOR Left 4/29/2024    Procedure: LEFT ANTERIOR VITRECTOMY;  Surgeon: Dre Layton MD;  Location: [facility] OR    [facility] EXPLORATORY OF ABDOMEN      Description: Exploratory Laparotomy;  Recorded: 09/25/2008;  Comments: small bowel obstruction       Meds     Current Outpatient Medications   Medication Sig Dispense Refill    amLODIPine (NORVASC) 2.5 MG tablet Take 1 tablet (2.5 mg) by mouth daily 90 tablet 1    atorvastatin (LIPITOR) 20 MG tablet Take 1 tablet (20 mg) by mouth at bedtime 90 tablet 3    cloNIDine (CATAPRES) 0.1 MG tablet Take 2 tablets (0.2 mg) by mouth 2 times daily 360 tablet 2    finasteride (PROSCAR) 5 MG tablet TAKE ONE TABLET BY MOUTH ONCE DAILY 90 tablet 3    furosemide (LASIX) 20 MG tablet Take 1 tablet (20 mg) by mouth daily 90 tablet 2    losartan (COZAAR) 100 MG tablet TAKE 1 TABLET(100 MG) BY MOUTH DAILY 90 tablet 3    metoprolol succinate ER (TOPROL XL) 200 MG 24 hr tablet TAKE 1 TABLET(200 MG) BY MOUTH DAILY 90 tablet 3    ofloxacin (OCUFLOX) 0.3 % ophthalmic solution Place 1 drop Into the left eye 4 times daily 5 mL 0    omeprazole (PRILOSEC) 20 MG DR capsule Take 1 capsule (20 mg) by mouth daily 90 capsule 2    oxyBUTYnin (DITROPAN) 5 MG tablet Take 1 tablet (5 mg) by mouth at bedtime 90 tablet 0    potassium chloride ER (K-TAB/KLOR-CON) 10 MEQ CR tablet TAKE 1 TABLET(10 MEQ) BY MOUTH TWICE DAILY 180 tablet 1    prednisoLONE acetate (PRED FORTE) 1 % ophthalmic suspension Place 1 drop Into the left eye 4 times daily 5 mL 1    prednisoLONE acetate (PRED FORTE) 1 % ophthalmic suspension Place 1 drop Into the left eye 6 times daily 15 mL 3    spironolactone (ALDACTONE) 25 MG tablet Take 1 tablet (25 mg) by mouth daily 90 tablet 3    tamsulosin (FLOMAX) 0.4 MG capsule TAKE 1 CAPSULE BY MOUTH DAILY AFTER BREAKFAST. 90 capsule 3     No  current facility-administered medications for this visit.       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  Discussed that patient will most likely need a surgical procedure.  The type of procedure depends on the healing during the following month  Central scarring is noted and might affect the attachment of a DSEAK graft    Options include  -lens removal + PK  -DSEAK  -Kpro lens removal  +/- adjustment of the tube     Currently on prednisolone four times daily   +4 corneal edema    Follow up:  Oph:2 weeks    Thank you for entrusting us with your care  Giovanna Loo MD, PGY3  Ophthalmology Resident  [facility]   Attending Physician Attestation:  Complete documentation of historical and exam elements from today's encounter can be found in the full encounter summary report (not reduplicated in this progress note).  I personally obtained the chief complaint(s) and history of present illness.  I confirmed and edited as necessary the review of systems, past medical/surgical history, family history, social history, and examination findings as documented by others; and I examined the patient myself.  I personally reviewed the relevant tests, images, and reports as documented above.  I formulated and edited as necessary the assessment and plan and discussed the findings and management plan with the patient and family. I personally reviewed the ophthalmic test(s) associated with this encounter, agree with the interpretation(s) as documented by the resident/fellow, and have edited the corresponding  report(s) as necessary.- Kobi Archibald MD

## 2024-05-28 ENCOUNTER — OFFICE VISIT (OUTPATIENT)
Dept: FAMILY MEDICINE | Facility: CLINIC | Age: 64
End: 2024-05-28
Payer: MEDICARE

## 2024-05-28 VITALS
SYSTOLIC BLOOD PRESSURE: 99 MMHG | RESPIRATION RATE: 14 BRPM | DIASTOLIC BLOOD PRESSURE: 52 MMHG | HEART RATE: 40 BPM | OXYGEN SATURATION: 98 % | TEMPERATURE: 97.4 F

## 2024-05-28 DIAGNOSIS — D12.6 BENIGN NEOPLASM OF COLON, UNSPECIFIED PART OF COLON: ICD-10-CM

## 2024-05-28 DIAGNOSIS — I10 ESSENTIAL HYPERTENSION: Primary | Chronic | ICD-10-CM

## 2024-05-28 DIAGNOSIS — H17.9 CORNEAL OPACITY OF LEFT EYE: ICD-10-CM

## 2024-05-28 DIAGNOSIS — R79.89 ABNORMAL ALDOSTERONE TO RENIN RATIO: ICD-10-CM

## 2024-05-28 DIAGNOSIS — N18.31 STAGE 3A CHRONIC KIDNEY DISEASE (H): ICD-10-CM

## 2024-05-28 DIAGNOSIS — Z00.00 HEALTHCARE MAINTENANCE: ICD-10-CM

## 2024-05-28 PROCEDURE — G0439 PPPS, SUBSEQ VISIT: HCPCS | Performed by: FAMILY MEDICINE

## 2024-05-28 PROCEDURE — 84132 ASSAY OF SERUM POTASSIUM: CPT | Performed by: FAMILY MEDICINE

## 2024-05-28 PROCEDURE — 99214 OFFICE O/P EST MOD 30 MIN: CPT | Mod: 24 | Performed by: FAMILY MEDICINE

## 2024-05-28 PROCEDURE — 36415 COLL VENOUS BLD VENIPUNCTURE: CPT | Performed by: FAMILY MEDICINE

## 2024-05-28 RX ORDER — RESPIRATORY SYNCYTIAL VIRUS VACCINE 120MCG/0.5
0.5 KIT INTRAMUSCULAR ONCE
Qty: 1 EACH | Refills: 0 | Status: CANCELLED | OUTPATIENT
Start: 2024-05-28 | End: 2024-05-28

## 2024-05-28 SDOH — HEALTH STABILITY: PHYSICAL HEALTH: ON AVERAGE, HOW MANY MINUTES DO YOU ENGAGE IN EXERCISE AT THIS LEVEL?: 0 MIN

## 2024-05-28 SDOH — HEALTH STABILITY: PHYSICAL HEALTH: ON AVERAGE, HOW MANY DAYS PER WEEK DO YOU ENGAGE IN MODERATE TO STRENUOUS EXERCISE (LIKE A BRISK WALK)?: 0 DAYS

## 2024-05-28 ASSESSMENT — SOCIAL DETERMINANTS OF HEALTH (SDOH): HOW OFTEN DO YOU GET TOGETHER WITH FRIENDS OR RELATIVES?: ONCE A WEEK

## 2024-05-28 NOTE — ASSESSMENT & PLAN NOTE
Enucleated right eye,  Left eye vision still severely obscured due to corneal opacity, even after recent cataract surgery.  Per ophthalmology.

## 2024-05-28 NOTE — ASSESSMENT & PLAN NOTE
Due/overdue for colonoscopy.  Not willing to do this at this time due to eye problems.  But agrees to discussion going forward.

## 2024-05-28 NOTE — COMMUNITY RESOURCES LIST (ENGLISH)
May 28, 2024           YOUR PERSONALIZED LIST OF SERVICES & PROGRAMS               Medical Transportation, (NEMT)      [facility] Nurse Program - Transportation to medical appointments  [address] [place] [address] (Distance: 3.8 miles)  Phone: [phone]  Website: http://[facility].org/  Language: English, Ugandan, Vietnamese  Fee: Sliding scale  Accessibility: Translation services      [facility] - Transportation to medical appointments  [address] [address] (Distance: 3.5 miles)  Phone: [phone]  Website: https://www.[employer]/  Language: English  Fee: Self pay, Insurance      Social Service [facility] - Neighbor to Neighbor Program  Phone: [phone]) [phone]  Email: vin@[email or web address]  Website: https://www.[facility].org/services/older-adults/-services/neighbor-to-neighbor  Language: English  Hours: Mon 8:00 AM - 5:00 PM Tue 8:00 AM - 5:00 PM Wed 8:00 AM - 5:00 PM Thu 8:00 AM - 5:00 PM Fri 8:00 AM - 5:00 PM  Fee: Insurance, Self pay  Accessibility: Deaf or hard of hearing, Blind accommodation, Translation services    Expense Assistance      [place] - Transit Link  [address] [place] [address] (Distance: 1.3 miles)  Phone: [phone]) [phone]  Website: https://[facility]/Transportation/Services/Transit-Link.aspx  Language: English  Fee: Self pay      [facility] - [place] - Transit Assistance Program (TAP) - Transportation expense assistance  [address] [place] [address] (Distance: 1.3 miles)  Language: English, Ugandan  Fee: Free, Sliding scale, Self pay  Accessibility: Translation services      - Dislocated Worker/Adult [employer] Employment Program  Phone: (855) 689-5977  Email: asif@[employer].org  Website: https://[employer].org/services/employment-services/dislocated-worker-program/  Language: English, Vietnamese  Hours: Mon 8:00 AM - 4:30 PM Tue 8:00 AM - 4:30 PM Wed 8:00 AM - 4:30 PM Thu 8:00 AM - 4:30 PM Fri 8:00 AM -  4:30 PM  Fee: Free  Accessibility: Ada accessible    [facility] - Transit Link  [address] [place] [address] (Distance: 1.3 miles)  Phone: [phone]) [phone]  Website: https://[employer]/Transportation/Services/Transit-Link.aspx  Language: English  Fee: Self pay      Transit - [place] - Transit Link  [address] [place] [address] (Distance: 1.3 miles)  Language: English  Fee: Self pay  Accessibility: Translation services      [facility] Transportation - How [employer] works  Phone: (386) 649-3964  Website: https://www.[employer]/members/shop-plans/[place]-health-care-programs/blueride-transportation  Language: English  Hours: Mon 8:00 AM - 5:00 PM Tue 8:00 AM - 5:00 PM Wed 8:00 AM - 5:00 PM Thu 8:00 AM - 5:00 PM Fri 8:00 AM - 5:00 PM               IMPORTANT NUMBERS & WEBSITES        Emergency Services  911  .   [facility]  211 http://211unitedway.org  .   Poison Control  (282) 538-7774 http://[place]poison.org http://[place]poison.org  .     Suicide and Crisis Lifeline  988 http://988lifeline.org  .   Childhelp National Child Abuse Hotline  908.590.6811 http://Childhelphotline.org   .   National Sexual Assault Hotline  (921) 941-4746 (HOPE) http://Rainn.org   .     National Runaway Safeline  (495) 374-7992 (RUNAWAY) http://1800runa[employer]ate Research.org  .   Pregnancy & Postpartum Support  Call/text 219-079-0691  [place]: http://ppsupportmn.org  [place]: http://bulletn..com/wi  .   Substance Abuse National Helpline ([facility])  164-099-HELP (4747) http://Findtreatment.gov   .                DISCLAIMER: These resources have been generated via the [employer] Platform. [employer] does not endorse any service providers mentioned in this resource list. [employer] does not guarantee that the services mentioned in this resource list will be available to you or will improve your health or wellness.    [facility]

## 2024-05-28 NOTE — ASSESSMENT & PLAN NOTE
Losartan 100, blood pressure now well-controlled, will check potassium given administration of spironolactone.

## 2024-05-28 NOTE — ASSESSMENT & PLAN NOTE
Blood pressure much better since starting spironolactone, has not yet scheduled with endocrine.  Recheck potassium

## 2024-05-28 NOTE — ASSESSMENT & PLAN NOTE
Discussed zoster, colon cancer screening.  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

## 2024-05-28 NOTE — ASSESSMENT & PLAN NOTE
Suddenly well-controlled, recently started aldosterone after elevated aldosterone/renin ratio.  Will back off on amlodipine, chose to start de-escalation with this medicine because of history of lower extremity edema (though that has improved as well since starting spironolactone)  Follow-up with in 2 months, may need preop.

## 2024-05-28 NOTE — PROGRESS NOTES
"Preventive Care Visit  [facility]  Ladarius Soria MD, Family Medicine  May 28, 2024      Assessment & Plan   Problem List Items Addressed This Visit          Digestive    Benign Adenomatous Polyp Of The Large Intestine     Due/overdue for colonoscopy.  Not willing to do this at this time due to eye problems.  But agrees to discussion going forward.            Circulatory    Essential hypertension - Primary (Chronic)     Suddenly well-controlled, recently started aldosterone after elevated aldosterone/renin ratio.  Will back off on amlodipine, chose to start de-escalation with this medicine because of history of lower extremity edema (though that has improved as well since starting spironolactone)  Follow-up with in 2 months, may need preop.         Relevant Orders    Potassium       Urinary    Stage 3a chronic kidney disease (H)     Losartan 100, blood pressure now well-controlled, will check potassium given administration of spironolactone.            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  Recheck potassium            Other    Healthcare maintenance     Discussed zoster, colon cancer screening.  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         Corneal opacity of left eye     Enucleated right eye,  Left eye vision still severely obscured due to corneal opacity, even after recent cataract surgery.  Per ophthalmology.                    BMI  Estimated body mass index is 27.07 kg/m  as calculated from the following:    Height as of 4/29/24: 1.651 m (5' 5\").    Weight as of 4/29/24: 73.8 kg (162 lb 11.2 oz).       Counseling  Appropriate preventive services were discussed with this patient, including applicable screening as appropriate for fall prevention, nutrition, physical activity, " Tobacco-use cessation, weight loss and cognition.  Checklist reviewing preventive services available has been given to the patient.  Reviewed patient's diet, addressing concerns and/or questions.   The patient was instructed to see the dentist every 6 months.   Updated plan of care.  Patient reported difficulty with activities of daily living were addressed today.The patient was provided with written information regarding signs of hearing loss.   Information on urinary incontinence and treatment options given to patient.           Gabriela Novoa is a 64 year old, presenting for the following:  Recheck Medication and Wellness Visit        5/28/2024     1:24 PM   Additional Questions   Roomed by antonio   Accompanied by caretaker       Health Care Directive  Patient does not have a Health Care Directive or Living Will: Discussed advance care planning with patient; information given to patient to review.    HPI  64-year-old  History of enucleated right eye  Now with severe vision problems left eye.  Corneal opacity, cataract, recent surgery on that.  Consideration going forward of corneal transplant.  Here with his sister.  History of cerebral palsy.  On multiple medications for resistant high blood pressure.      5/28/2024   General Health   How would you rate your overall physical health? (!) FAIR   Feel stress (tense, anxious, or unable to sleep) Not at all         5/28/2024   Nutrition   Diet: Low salt    Carbohydrate counting         5/28/2024   Exercise   Days per week of moderate/strenous exercise 0 days   Average minutes spent exercising at this level 0 min   (!) EXERCISE CONCERN      5/28/2024   Social Factors   Frequency of gathering with friends or relatives Once a week   Worry food won't last until get money to buy more No   Food not last or not have enough money for food? No   Do you have housing?  Yes   Are you worried about losing your housing? No   Lack of transportation? Yes   Unable to get utilities  (heat,electricity)? No    (!) TRANSPORTATION CONCERN PRESENT      5/28/2024   Fall Risk   Fallen 2 or more times in the past year? Yes   Trouble with walking or balance? Yes           5/28/2024   Activities of Daily Living- Home Safety   Needs help with the following daily activites Telephone use    Transportation    Shopping    Preparing meals    Housework    Bathing    Laundry    Medication administration    Dressing   Safety concerns in the home None of the above         5/28/2024   Dental   Dentist two times every year? (!) NO         5/28/2024   Hearing Screening   Hearing concerns? (!) I FEEL THAT PEOPLE ARE MUMBLING OR NOT SPEAKING CLEARLY.    (!) I NEED TO ASK PEOPLE TO SPEAK UP OR REPEAT THEMSELVES.    (!) IT'S HARD TO FOLLOW A CONVERSATION IN A NOISY RESTAURANT OR CROWDED ROOM.    (!) TROUBLE UNDERSTANDING SOFT OR WHISPERED SPEECH.    (!) TROUBLE UNDERSTANDING SPEECH ON THE TELEPHONE         5/28/2024   Driving Risk Screening   Patient/family members have concerns about driving No         5/28/2024   General Alertness/Fatigue Screening   Have you been more tired than usual lately? No         5/28/2024   Urinary Incontinence Screening   Bothered by leaking urine in past 6 months Yes         5/28/2024   TB Screening   Were you born outside of the [place]? No         Today's PHQ-2 Score:       5/28/2024     1:15 PM   PHQ-2 ( 1999 Pfizer)   Q1: Little interest or pleasure in doing things 0   Q2: Feeling down, depressed or hopeless 0   PHQ-2 Score 0   Q1: Little interest or pleasure in doing things Not at all   Q2: Feeling down, depressed or hopeless Not at all   PHQ-2 Score 0           5/28/2024   Substance Use   Alcohol more than 3/day or more than 7/wk Not Applicable   Do you have a current opioid prescription? No   How severe/bad is pain from 1 to 10? 0/10 (No Pain)   Do you use any other substances recreationally? No     Social History     Tobacco Use    Smoking status: Never     Passive exposure: Never     Smokeless tobacco: Never   Vaping Use    Vaping status: Never Used   Substance Use Topics    Alcohol use: No    Drug use: No           5/28/2024   AAA Screening   Family history of Abdominal Aortic Aneurysm (AAA)? No   Last PSA:   Prostate Specific Antigen Screen   Date Value Ref Range Status   10/09/2012 2.6 <3.6 ng/mL Final     Comment:     Method is Abbott Prostate-Specific Antigen (PSA)            Standard-WHO 1st International (90:10) as of 09/26/05       ASCVD Risk   The 10-year ASCVD risk score (Ani SAAVEDRA, et al., 2019) is: 11%    Values used to calculate the score:      Age: 64 years      Sex: Male      Is Non- : No      Diabetic: No      Tobacco smoker: No      Systolic Blood Pressure: 99 mmHg      Is BP treated: Yes      HDL Cholesterol: 38 mg/dL      Total Cholesterol: 176 mg/dL            Reviewed and updated as needed this visit by Provider                      Current providers sharing in care for this patient include:  Patient Care Team:  Ladarius Soria MD as PCP - General (Family Medicine)  Ladarius Soria MD as Assigned PCP  Flori Hamilton MD as MD (Ophthalmology)  Leeanne Brooke MD as MD (Ophthalmology)  Dre Layton MD as Assigned Surgical Provider  Clint Gonzales MD as MD (Urology)    The following health maintenance items are reviewed in Epic and correct as of today:  Health Maintenance   Topic Date Due    HF ACTION PLAN  Never done    ADVANCE CARE PLANNING  Never done    ZOSTER IMMUNIZATION (1 of 2) Never done    RSV VACCINE (Pregnancy & 60+) (1 - 1-dose 60+ series) Never done    COLORECTAL CANCER SCREENING  11/30/2022    MEDICARE ANNUAL WELLNESS VISIT  Never done    COVID-19 Vaccine (6 - 2023-24 season) 06/18/2024    CBC  10/10/2024    BMP  10/23/2024    LIPID  12/05/2024    ANNUAL REVIEW OF HM ORDERS  12/05/2024    ALT  04/23/2025    MICROALBUMIN  04/23/2025    HEMOGLOBIN  04/23/2025    FALL RISK ASSESSMENT  05/28/2025    GLUCOSE   "04/23/2027    DTAP/TDAP/TD IMMUNIZATION (5 - Td or Tdap) 07/18/2029    TSH W/FREE T4 REFLEX  Completed    HEPATITIS C SCREENING  Completed    PHQ-2 (once per calendar year)  Completed    INFLUENZA VACCINE  Completed    Pneumococcal Vaccine: 65+ Years  Completed    URINALYSIS  Completed    IPV IMMUNIZATION  Aged Out    HPV IMMUNIZATION  Aged Out    MENINGITIS IMMUNIZATION  Aged Out    RSV MONOCLONAL ANTIBODY  Aged Out        Objective    Exam  BP 99/52 (BP Location: Left arm, Patient Position: Sitting, Cuff Size: Adult Regular)   Pulse (!) 40   Temp 97.4  F (36.3  C) (Temporal)   Resp 14   SpO2 98%    Estimated body mass index is 27.07 kg/m  as calculated from the following:    Height as of 4/29/24: 1.651 m (5' 5\").    Weight as of 4/29/24: 73.8 kg (162 lb 11.2 oz).    Physical Exam  Left corneal opacity,  In wheelchair.  Gen- alert, oriented/ appropriately responsive  HEENT- normal cephalic, atraumatic.   Chest- Normal inspiration and expiration.    Clear to ascultation.    No chest wall deformity or scar.  CV- Heart regular rate and rhythm  normal tones, no murmurs   No gallops or rubs.  Ext- appear well perfused, no edema  Skin- warm and dry,   no visualized rash          5/28/2024   Mini Cog   Mini-Cog Not Completed (choose reason) Deaf/hard of hearing   3 Item Recall 2 objects recalled          Prior to immunization administration, verified patients identity using patient s name and date of birth. Please see Immunization Activity for additional information.     Screening Questionnaire for Adult Immunization    Are you sick today?   No   Do you have allergies to medications, food, a vaccine component or latex?   No   Have you ever had a serious reaction after receiving a vaccination?   No   Do you have a long-term health problem with heart, lung, kidney, or metabolic disease (e.g., diabetes), asthma, a blood disorder, no spleen, complement component deficiency, a cochlear implant, or a spinal fluid leak?  Are " you on long-term aspirin therapy?   Yes   Do you have cancer, leukemia, HIV/AIDS, or any other immune system problem?   No   Do you have a parent, brother, or sister with an immune system problem?   No   In the past 3 months, have you taken medications that affect  your immune system, such as prednisone, other steroids, or anticancer drugs; drugs for the treatment of rheumatoid arthritis, Crohn s disease, or psoriasis; or have you had radiation treatments?   No   Have you had a seizure, or a brain or other nervous system problem?   No   During the past year, have you received a transfusion of blood or blood    products, or been given immune (gamma) globulin or antiviral drug?   No   For women: Are you pregnant or is there a chance you could become       pregnant during the next month?   No   Have you received any vaccinations in the past 4 weeks?   No     Immunization questionnaire was positive for at least one answer.  Notified .      Patient instructed to remain in clinic for 15 minutes afterwards, and to report any adverse reactions.     Screening performed by Lucian Mera MA on 5/28/2024 at 1:29 PM.      Signed Electronically by: Ladarius Soria MD

## 2024-05-29 ENCOUNTER — OFFICE VISIT (OUTPATIENT)
Dept: OPHTHALMOLOGY | Facility: CLINIC | Age: 64
End: 2024-05-29
Attending: OPHTHALMOLOGY
Payer: MEDICARE

## 2024-05-29 DIAGNOSIS — Q13.81: ICD-10-CM

## 2024-05-29 DIAGNOSIS — Q13.81: Primary | ICD-10-CM

## 2024-05-29 DIAGNOSIS — Z96.1 PSEUDOPHAKIA OF LEFT EYE: ICD-10-CM

## 2024-05-29 DIAGNOSIS — H40.9 ADVANCED STAGE GLAUCOMA: ICD-10-CM

## 2024-05-29 DIAGNOSIS — Z48.810 AFTERCARE FOLLOWING SURGERY OF A SENSE ORGAN: Primary | ICD-10-CM

## 2024-05-29 LAB — POTASSIUM SERPL-SCNC: 5.7 MMOL/L (ref 3.4–5.3)

## 2024-05-29 PROCEDURE — 99024 POSTOP FOLLOW-UP VISIT: CPT | Mod: GC | Performed by: OPHTHALMOLOGY

## 2024-05-29 PROCEDURE — 999N000103 HC STATISTIC NO CHARGE FACILITY FEE

## 2024-05-29 PROCEDURE — 76512 OPH US DX B-SCAN: CPT | Mod: XU | Performed by: OPHTHALMOLOGY

## 2024-05-29 PROCEDURE — G0463 HOSPITAL OUTPT CLINIC VISIT: HCPCS | Performed by: OPHTHALMOLOGY

## 2024-05-29 PROCEDURE — 76513 OPH US DX ANT SGM US UNI/BI: CPT | Performed by: OPHTHALMOLOGY

## 2024-05-29 PROCEDURE — 99207 ULTRASOUND B-SCAN OS (LEFT EYE): CPT | Mod: 26 | Performed by: OPHTHALMOLOGY

## 2024-05-29 PROCEDURE — 99213 OFFICE O/P EST LOW 20 MIN: CPT | Mod: 24 | Performed by: OPHTHALMOLOGY

## 2024-05-29 ASSESSMENT — TONOMETRY
OD_IOP_MMHG: PROS
IOP_METHOD: TONOPEN
OD_IOP_MMHG: PROS
OS_IOP_MMHG: 05
OS_IOP_MMHG: 05
IOP_METHOD: ICARE

## 2024-05-29 ASSESSMENT — VISUAL ACUITY
OS_SC: LP
OS_SC: LP
OD_SC: PROS
OD_SC: PROS
METHOD: SNELLEN - LINEAR
METHOD: SNELLEN - LINEAR

## 2024-05-29 ASSESSMENT — SLIT LAMP EXAM - LIDS
COMMENTS: NORMAL
COMMENTS: NORMAL

## 2024-05-29 NOTE — NURSING NOTE
Chief Complaints and History of Present Illnesses   Patient presents with    Follow Up     Kisha's anomaly left eye      Chief Complaint(s) and History of Present Illness(es)       Follow Up              Comments: Kisha's anomaly left eye               Comments    Pt states no change since last visit   States no eye pain     Randee Shane COT 12:17 PM May 29, 2024

## 2024-05-29 NOTE — PROGRESS NOTES
S/P left eye CE IOL - zonulysis was present, CTR was placed (Madan) + Glaucoma ahmed tube shunt (Sheheitli) 12/18/23    History of Present Illness:   Bright Lockett is a 64 year old patient who presents for post op. Per chart note from [facility] on 07/07/23, he previously was followed by Dr. Ritter, but had not been on drops for years at that time. At that visit, his eye pressure was in the 30s. He has a history of glaucoma, Reiger's anomaly, and a cataract in the left eye.    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 Archibald is considering PK or DAESK or Kpro; I recommend IOL/CTR removal with any of these procedures.      Medications to operative eye     Taper  Prednisolone (pink top) 3/day right eye     Follow-up with retina after cornea procedure     Emmett Gordon MD MPH  Vitreoretinal Fellow PGY-6  [facility]       Complete documentation of historical and exam elements from today's encounter can be found in the full encounter summary report (not reduplicated in this progress note). I personally obtained the chief complaint(s) and history of present illness.  I confirmed and edited as necessary the review of systems, past medical/surgical history, family history, social history, and examination findings as documented by others; and I examined the patient myself. I personally reviewed the relevant tests, images, and reports as documented above. I formulated and edited as necessary the assessment and plan and discussed the findings and management plan with the patient and family.    Dre Hager MD  ,  Vitreoretinal surgery   Department of Ophthalmology  [facility]

## 2024-05-29 NOTE — NURSING NOTE
Chief Complaints and History of Present Illnesses   Patient presents with    Post Op (Ophthalmology) Left Eye     LEFT INTRAOCULAR REPOSITIONING, ANTERIOR CHAMBER REFORMATION  4/29/24  LEFT ANTERIOR VITRECTOMY   WASHOUT, LEFT EYE, ANTERIOR CHAMBER           Chief Complaint(s) and History of Present Illness(es)       Post Op (Ophthalmology) Left Eye              Comments: LEFT INTRAOCULAR REPOSITIONING, ANTERIOR CHAMBER REFORMATION  4/29/24  LEFT ANTERIOR VITRECTOMY   WASHOUT, LEFT EYE, ANTERIOR CHAMBER                    Comments    Pt states no change since last visit   States no eye pain     Randee Shane COT 12:17 PM May 29, 2024

## 2024-05-29 NOTE — PROGRESS NOTES
"Chief complaint   Post-surgical evaluation    HPI    Bright Lockett 64 year old male presents for evaluation      Chief Complaint(s) and History of Present Illness(es)       Corneal Edema Evaluation    In left eye.  Pain was noted as 0/10.             Comments    Referred by Dr. Hager for K Edema left eye     status post left eye IOL repositioning, AC washout, ant vitrectomy 4/29/24 The floaters he sent a my chart message about on 5/1 have dissipated He notes the \"dead spot\" in his vision of his left eye is still present, he feels the clarity of his vision in his left eye has diminished some. He doesn't see any improvement and considers this a half step back.     Prednisolone (white or pink top) 4/day LEFT eye   Ofloxacin (tan top) 4/day LEFT eye     Beata Trejo, COT 12:13 PM 05/08/2024                      Interval hx 05/29/2024  Chief Complaint(s) and History of Present Illness(es)       Follow Up     Additional comments: Kisha's anomaly left eye              Comments    Pt states no change since last visit   States no eye pain     Randee Svitlana COT 12:17 PM May 29, 2024                        Past ocular history   Prior eye surgery/laser/Trauma:   S/P left eye CE IOL - zonulysis was present, CTR was placed (Madan) + Glaucoma ahmed tube shunt (Alfonso) 12/18/23   S/p left eye AC washout and vitrectomy 4/29/24.       PMH     Past Medical History:   Diagnosis Date    Aortic sclerosis     Back pain     Benign neoplasm of adenomatous polyp     of the large intestine    Carpal tunnel syndrome     Cerebral palsy (H)     Diverticulosis     Esophageal reflux     Glaucoma     Hyperlipidemia     Hypertension     Left ventricular hypertrophy     Leg weakness     left    Neuritis     Osteoarthritis of knee        PSH     Past Surgical History:   Procedure Laterality Date    IMPLANT VALVE EYE Left 12/18/2023    Procedure: INSERTION, GLAUCOMA SHUNT IMPLANT, EYE - Left;  Surgeon: Flori Hamilton MD;  [facility] "    PHACOEMULSIFICATION WITH STANDARD INTRAOCULAR LENS IMPLANT Left 12/18/2023    Procedure: LEFT - Phacoemulsificaiton  CATARACT,  WITH INTRAOCULAR LENS IMPLANT INSERTION, CTR, iridectomy;  Surgeon: Dre Layton MD;  [facility]    REPOSITION INTRAOCULAR LENS Left 4/29/2024    Procedure: LEFT INTRAOCULAR REPOSITIONING, ANTERIOR CHAMBER REFORMATION;  Surgeon: Dre Layton MD;  [facility]    VITRECTOMY ANTERIOR Left 4/29/2024    Procedure: LEFT ANTERIOR VITRECTOMY;  Surgeon: Dre Layton MD;  [facility]    ZZC EXPLORATORY OF ABDOMEN      Description: Exploratory Laparotomy;  Recorded: 09/25/2008;  Comments: small bowel obstruction       Meds     Current Outpatient Medications   Medication Sig Dispense Refill    amLODIPine (NORVASC) 2.5 MG tablet Take 1 tablet (2.5 mg) by mouth daily 90 tablet 1    atorvastatin (LIPITOR) 20 MG tablet Take 1 tablet (20 mg) by mouth at bedtime 90 tablet 3    cloNIDine (CATAPRES) 0.1 MG tablet Take 2 tablets (0.2 mg) by mouth 2 times daily 360 tablet 2    finasteride (PROSCAR) 5 MG tablet TAKE ONE TABLET BY MOUTH ONCE DAILY 90 tablet 3    furosemide (LASIX) 20 MG tablet Take 1 tablet (20 mg) by mouth daily 90 tablet 2    losartan (COZAAR) 100 MG tablet TAKE 1 TABLET(100 MG) BY MOUTH DAILY 90 tablet 3    metoprolol succinate ER (TOPROL XL) 200 MG 24 hr tablet TAKE 1 TABLET(200 MG) BY MOUTH DAILY 90 tablet 3    omeprazole (PRILOSEC) 20 MG DR capsule Take 1 capsule (20 mg) by mouth daily 90 capsule 2    oxyBUTYnin (DITROPAN) 5 MG tablet Take 1 tablet (5 mg) by mouth at bedtime 90 tablet 0    potassium chloride ER (K-TAB/KLOR-CON) 10 MEQ CR tablet TAKE 1 TABLET(10 MEQ) BY MOUTH TWICE DAILY 180 tablet 1    spironolactone (ALDACTONE) 25 MG tablet Take 1 tablet (25 mg) by mouth daily 90 tablet 3    tamsulosin (FLOMAX) 0.4 MG capsule TAKE 1 CAPSULE BY MOUTH DAILY AFTER BREAKFAST. 90 capsule 3     No current facility-administered medications  for this visit.       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  todayIOL is shifted anteriorly, Tube is at the cornea and extending paracentrally, no change in the vision      Plan  ------  Discussed surgical options with patient. The best approach would be to do an TKP, PKP, vitrectomy, tube palcement in the vitreous. The alternative is a Kpro which will be left for complex cases    Patient would like to wait and think about the procedure for now     Continue same eye drops    Follow up:  Oph:1 month    Attending Physician Attestation:  Complete documentation of historical and exam elements from today's encounter can be found in the full encounter summary report (not reduplicated in this progress note).  I personally obtained the chief complaint(s) and history of present illness.  I confirmed and edited as necessary the review of systems, past medical/surgical history, family history, social history, and examination findings as documented by others; and I examined the patient myself.  I personally reviewed the relevant tests, images, and reports as documented above.  I formulated and edited as necessary the assessment and plan and discussed the findings and management plan with the patient and family. - Kobi Archibald MD

## 2024-06-01 ENCOUNTER — MYC MEDICAL ADVICE (OUTPATIENT)
Dept: FAMILY MEDICINE | Facility: CLINIC | Age: 64
End: 2024-06-01
Payer: MEDICARE

## 2024-06-03 ENCOUNTER — PATIENT OUTREACH (OUTPATIENT)
Dept: GASTROENTEROLOGY | Facility: CLINIC | Age: 64
End: 2024-06-03
Payer: MEDICARE

## 2024-06-03 DIAGNOSIS — Z12.11 SPECIAL SCREENING FOR MALIGNANT NEOPLASMS, COLON: Primary | ICD-10-CM

## 2024-06-03 NOTE — PROGRESS NOTES
"CRC Screening Colonoscopy Referral Review    Patient meets the inclusion criteria for screening colonoscopy standing order.    Ordering/Referring Provider:  Ladarius Soria    BMI: Estimated body mass index is 27.07 kg/m  as calculated from the following:    Height as of 4/29/24: 1.651 m (5' 5\").    Weight as of 4/29/24: 73.8 kg (162 lb 11.2 oz).     Sedation:  Does patient have any of the following conditions affecting sedation?  No medical conditions affecting sedation.    Previous Scopes:  Any previous recommendations or follow up needs based on previous scope?  na / No recommendations.    Medical Concerns to Postpone Order:  Does patient have any of the following medical concerns that should postpone/delay colonoscopy referral?  No medical conditions affecting colonoscopy referral.    Final Referral Details:  Based on patient's medical history patient is appropriate for referral order with moderate sedation. If patient's BMI > 50 do not schedule in ASC.  "

## 2024-06-03 NOTE — TELEPHONE ENCOUNTER
Writer responded to patient message via [employer].     Monique Roberto, MSN, RN   [facility]

## 2024-06-08 ENCOUNTER — HEALTH MAINTENANCE LETTER (OUTPATIENT)
Age: 64
End: 2024-06-08

## 2024-06-26 ENCOUNTER — OFFICE VISIT (OUTPATIENT)
Dept: OPHTHALMOLOGY | Facility: CLINIC | Age: 64
End: 2024-06-26
Attending: OPHTHALMOLOGY
Payer: MEDICARE

## 2024-06-26 DIAGNOSIS — Q13.81: Primary | ICD-10-CM

## 2024-06-26 DIAGNOSIS — H17.9 CORNEAL OPACITY OF LEFT EYE: ICD-10-CM

## 2024-06-26 DIAGNOSIS — Z96.1 PSEUDOPHAKIA OF LEFT EYE: ICD-10-CM

## 2024-06-26 DIAGNOSIS — H40.9 ADVANCED STAGE GLAUCOMA: ICD-10-CM

## 2024-06-26 DIAGNOSIS — H54.40: ICD-10-CM

## 2024-06-26 PROCEDURE — 99214 OFFICE O/P EST MOD 30 MIN: CPT | Mod: 24 | Performed by: OPHTHALMOLOGY

## 2024-06-26 PROCEDURE — 999N000103 HC STATISTIC NO CHARGE FACILITY FEE

## 2024-06-26 PROCEDURE — 99024 POSTOP FOLLOW-UP VISIT: CPT | Mod: 24 | Performed by: OPHTHALMOLOGY

## 2024-06-26 PROCEDURE — G0463 HOSPITAL OUTPT CLINIC VISIT: HCPCS | Performed by: OPHTHALMOLOGY

## 2024-06-26 RX ORDER — POTASSIUM CHLORIDE 1500 MG/1
20 TABLET, EXTENDED RELEASE ORAL
COMMUNITY

## 2024-06-26 RX ORDER — PREDNISOLONE ACETATE 10 MG/ML
1-2 SUSPENSION/ DROPS OPHTHALMIC 3 TIMES DAILY
COMMUNITY

## 2024-06-26 ASSESSMENT — TONOMETRY
IOP_METHOD: ICARE
OS_IOP_MMHG: 26
IOP_METHOD: ICARE
OD_IOP_MMHG: PROSTHESIS
OS_IOP_MMHG: 26
OD_IOP_MMHG: PROSTHESIS

## 2024-06-26 ASSESSMENT — SLIT LAMP EXAM - LIDS
COMMENTS: NORMAL
COMMENTS: NORMAL

## 2024-06-26 ASSESSMENT — VISUAL ACUITY
OD_SC: PROSTHESIS
OS_SC: LP
OD_SC: PROSTHESIS
OS_SC: LP
METHOD: SNELLEN - LINEAR
METHOD: SNELLEN - LINEAR

## 2024-06-26 NOTE — NURSING NOTE
Chief Complaints and History of Present Illnesses   Patient presents with    Post Op (Ophthalmology) Left Eye     post left eye IOL repositioning, AC washout, ant vitrectomy 4/29/24     Chief Complaint(s) and History of Present Illness(es)       Post Op (Ophthalmology) Left Eye              Laterality: left eye    Course: stable    Associated symptoms: eye pain.  Negative for dryness, tearing and flashes    Pain scale: 0/10 (No pain today)    Comments: post left eye IOL repositioning, AC washout, ant vitrectomy 4/29/24              Comments    Over the past week he has had some episodes of feeling pressure in his left eye.   He tells me that it feels like a throb or a head ache when it occurs.      Simi Tomlinson, COT 1:20 PM  June 26, 2024

## 2024-06-26 NOTE — NURSING NOTE
Chief Complaints and History of Present Illnesses   Patient presents with    Follow Up     Kisha's anomaly     Chief Complaint(s) and History of Present Illness(es)       Follow Up              Laterality: left eye    Course: stable    Associated symptoms: eye pain.  Negative for dryness, tearing and flashes    Pain scale: 0/10 (None today)    Comments: Kisha's anomaly              Comments    Over the past week he has had some episodes of feeling pressure in his left eye.   He tells me that it feels like a throb or a head ache when it occurs.      Simi Tomlinson, COT 1:20 PM  June 26, 2024

## 2024-06-26 NOTE — PROGRESS NOTES
S/P left eye CE IOL - zonulysis was present, CTR was placed (Madan) + Glaucoma ahmed tube shunt (Sheheitli) 12/18/23    History of Present Illness:   Bright Lockett is a 64 year old patient who presents for post op. Per chart note from [facility] on 07/07/23, he previously was followed by Dr. Ritter, but had not been on drops for years at that time. At that visit, his eye pressure was in the 30s. He has a history of glaucoma, Reiger's anomaly, and a cataract in the left eye.    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 Archibald is considering PK or DAESK or Kpro and possible moving of glaucoma tube to pars plana; I recommend IOL/CTR removal with any of these procedures.   - risks of surgery including but not limited to infection, recurrent retinal detachment and need for repeat surgery, and risk of losing vision even beyond what he has now discussed. Bright is frustrated and upset and wants to proceed with the surgery. I even offered him to see another retinologist for a second opinion  - he will talk with Dr. Archibald and we will proceed with surgery if he wants     Medications to operative eye  Continue Prednisolone (pink top) 2/day right eye     Follow-up with retina after cornea procedure       Complete documentation of historical and exam elements from today's encounter can be found in the full encounter summary report (not reduplicated in this progress note). I personally obtained the chief complaint(s) and history of present illness.  I confirmed and edited as necessary the review of systems, past medical/surgical history, family history,  social history, and examination findings as documented by others; and I examined the patient myself. I personally reviewed the relevant tests, images, and reports as documented above. I formulated and edited as necessary the assessment and plan and discussed the findings and management plan with the patient and family.    Dre Hager MD

## 2024-06-26 NOTE — PROGRESS NOTES
"Chief complaint   Post-surgical evaluation    HPI    Bright Lockett 64 year old male presents for evaluation      Chief Complaint(s) and History of Present Illness(es)       Corneal Edema Evaluation    In left eye.  Pain was noted as 0/10.             Comments    Referred by Dr. Hager for K Edema left eye     status post left eye IOL repositioning, AC washout, ant vitrectomy 4/29/24 The floaters he sent a my chart message about on 5/1 have dissipated He notes the \"dead spot\" in his vision of his left eye is still present, he feels the clarity of his vision in his left eye has diminished some. He doesn't see any improvement and considers this a half step back.     Prednisolone (white or pink top) 4/day LEFT eye   Ofloxacin (tan top) 4/day LEFT eye     Beata Trejo, COT 12:13 PM 05/08/2024                      Interval hx 06/26/2024  Chief Complaint(s) and History of Present Illness(es)       Follow Up    In left eye.  Since onset it is stable.  Associated symptoms include eye pain.  Negative for dryness, tearing and flashes.  Pain was noted as 0/10 (None today). Additional comments: Kisha's anomaly             Comments    Over the past week he has had some episodes of feeling pressure in his left eye.   He tells me that it feels like a throb or a head ache when it occurs.      Simi Tomlinson, COT 1:20 PM  June 26, 2024                           Past ocular history   Prior eye surgery/laser/Trauma:   S/P left eye CE IOL - zonulysis was present, CTR was placed (Madan) + Glaucoma ahmed tube shunt (Alfonso) 12/18/23   S/p left eye AC washout and vitrectomy 4/29/24.       PMH     Past Medical History:   Diagnosis Date    Aortic sclerosis     Back pain     Benign neoplasm of adenomatous polyp     of the large intestine    Carpal tunnel syndrome     Cerebral palsy (H)     Diverticulosis     Esophageal reflux     Glaucoma     Hyperlipidemia     Hypertension     Left ventricular hypertrophy     Leg weakness     left "    Neuritis     Osteoarthritis of knee        PSH     Past Surgical History:   Procedure Laterality Date    IMPLANT VALVE EYE Left 12/18/2023    Procedure: INSERTION, GLAUCOMA SHUNT IMPLANT, EYE - Left;  Surgeon: Flori Hamilton MD;  [facility]    PHACOEMULSIFICATION WITH STANDARD INTRAOCULAR LENS IMPLANT Left 12/18/2023    Procedure: LEFT - Phacoemulsificaiton  CATARACT,  WITH INTRAOCULAR LENS IMPLANT INSERTION, CTR, iridectomy;  Surgeon: Dre Layton MD;  [facility]    REPOSITION INTRAOCULAR LENS Left 4/29/2024    Procedure: LEFT INTRAOCULAR REPOSITIONING, ANTERIOR CHAMBER REFORMATION;  Surgeon: Dre Layton MD;  [facility]    VITRECTOMY ANTERIOR Left 4/29/2024    Procedure: LEFT ANTERIOR VITRECTOMY;  Surgeon: Dre Layton MD;  [facility]    ZZC EXPLORATORY OF ABDOMEN      Description: Exploratory Laparotomy;  Recorded: 09/25/2008;  Comments: small bowel obstruction       Meds     Current Outpatient Medications   Medication Sig Dispense Refill    atorvastatin (LIPITOR) 20 MG tablet Take 1 tablet (20 mg) by mouth at bedtime 90 tablet 3    cloNIDine (CATAPRES) 0.1 MG tablet Take 2 tablets (0.2 mg) by mouth 2 times daily 360 tablet 2    finasteride (PROSCAR) 5 MG tablet TAKE ONE TABLET BY MOUTH ONCE DAILY 90 tablet 3    furosemide (LASIX) 20 MG tablet Take 1 tablet (20 mg) by mouth daily 90 tablet 2    losartan (COZAAR) 100 MG tablet TAKE 1 TABLET(100 MG) BY MOUTH DAILY 90 tablet 3    metoprolol succinate ER (TOPROL XL) 200 MG 24 hr tablet TAKE 1 TABLET(200 MG) BY MOUTH DAILY 90 tablet 3    omeprazole (PRILOSEC) 20 MG DR capsule Take 1 capsule (20 mg) by mouth daily 90 capsule 2    oxyBUTYnin (DITROPAN) 5 MG tablet Take 1 tablet (5 mg) by mouth at bedtime 90 tablet 0    potassium chloride ER (K-TAB) 20 MEQ CR tablet Take 20 mEq by mouth      prednisoLONE acetate (PRED FORTE) 1 % ophthalmic suspension Place 1-2 drops Into the left eye 3 times daily       spironolactone (ALDACTONE) 25 MG tablet Take 1 tablet (25 mg) by mouth daily 90 tablet 3    tamsulosin (FLOMAX) 0.4 MG capsule TAKE 1 CAPSULE BY MOUTH DAILY AFTER BREAKFAST. 90 capsule 3     No current facility-administered medications for this visit.       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  todayIOL is shifted anteriorly, Tube is at the cornea and extending paracentrally, no change in the vision    6/26/24: cornea is more opaque centrally    Plan  ------  Patient is still thinking about the procedure.   Discussed again that the chances of improved vision is low but only a surgical option is possible if patient would like to try and improve his vision.    Discussed surgical options with patient. The best approach would be to do an TKP, PKP, vitrectomy, tube palcement in the vitreous. The alternative is a Kpro which will be left for complex cases  To do bloc end of surgery  May need postop Oxy  Continue same eye drops    Follow up:  Oph:plan for surgery    Attending Physician Attestation:  Complete documentation of historical and exam elements from today's encounter can be found in the full encounter summary report (not reduplicated in this progress note).  I personally obtained the chief complaint(s) and history of present illness.  I confirmed and edited as necessary the review of systems, past medical/surgical history, family history, social history, and examination findings as documented by others; and I examined the patient myself.  I personally reviewed the relevant tests, images, and reports  as documented above.  I formulated and edited as necessary the assessment and plan and discussed the findings and management plan with the patient and family. - Kobi Archibald MD

## 2024-06-26 NOTE — Clinical Note
Hey guys,  Patient wants to have the surgery. Are you okay with that? We need a PKP. TKP, IOL removal, vitrectomy and tube revision. Are you guys okay with that so we can plan him? I dont think he can handle more than one surgery so better we do all at once if we can although its not gonna be easy to schedule. Let me know and we can place case requests. I told him dont expect much and there is high chance he might not improve at all.

## 2024-06-27 ENCOUNTER — PREP FOR PROCEDURE (OUTPATIENT)
Dept: OPHTHALMOLOGY | Facility: CLINIC | Age: 64
End: 2024-06-27
Payer: MEDICARE

## 2024-06-27 DIAGNOSIS — Z96.89 HISTORY OF GLAUCOMA TUBE SHUNT PROCEDURE: Primary | ICD-10-CM

## 2024-06-27 DIAGNOSIS — Q13.81 AXENFELD-RIEGER SYNDROME: ICD-10-CM

## 2024-06-27 DIAGNOSIS — H40.9 ADVANCED STAGE GLAUCOMA: ICD-10-CM

## 2024-06-27 DIAGNOSIS — H17.9 CORNEAL OPACITY OF LEFT EYE: Primary | ICD-10-CM

## 2024-06-27 DIAGNOSIS — H40.52X3 GLAUCOMA ASSOCIATED WITH OCULAR DISORDER, LEFT, SEVERE STAGE: ICD-10-CM

## 2024-06-27 DIAGNOSIS — T85.22XD DISLOCATION OF INTRAOCULAR LENS, SUBSEQUENT ENCOUNTER: ICD-10-CM

## 2024-07-10 ENCOUNTER — TELEPHONE (OUTPATIENT)
Dept: FAMILY MEDICINE | Facility: CLINIC | Age: 64
End: 2024-07-10
Payer: MEDICARE

## 2024-07-10 NOTE — TELEPHONE ENCOUNTER
"Call received from patient's sister, Leeanne:  Patient asked her to call to schedule an appointment  Patient signed consent and Leeanne has Power of     Writer informed Leeanne:  Writer does not see Leeanne on any consent for patient nor any health care directive/activated health care agent documents in patient's chart  What is the best contact number for patient as writer will call patient to try and obtain verbal consent to speak with Leeannejamil Fallon verbalized understanding and in agreement with plan.  Per Leeanne, patient can be reached at [phone].    Writer called patient at number provided by Leeanne:    Left message to call back and ask to speak with an available triage nurse.    Of note, patient sent [employer] message on 7/5/24:  \"Sapna, Dr. Soria!     Life just gets more interesting over here.  I have begun to have weakness in my right ankle, which is preventing me from walking safely.  I was wondering if you would want to take a look at the ankle first or if I need a referral to an orthopedic physician for this.  I will keep an eye on this, and I believe you wanted me to come back in August, so I'll wait until then to do further questions about it.  Thank you for your moment!     [facility] [place]\"      BLANCA Castillo, RN-[facility]      "

## 2024-07-11 ENCOUNTER — TELEPHONE (OUTPATIENT)
Dept: OPHTHALMOLOGY | Facility: CLINIC | Age: 64
End: 2024-07-11
Payer: MEDICARE

## 2024-07-11 NOTE — TELEPHONE ENCOUNTER
Left voicemail for patient regarding scheduling surgery with Dr. Hamilton/Dr Archibald/Dr Hager.  Provided contact number to discuss.  [phone]    Louise Ye, on 7/11/2024 at 3:23 PM

## 2024-07-17 NOTE — TELEPHONE ENCOUNTER
[facility] to schedule surgery  Left call back of [phone]      Anna C. Schoenecker on 7/17/2024 at 9:46 AM

## 2024-07-18 NOTE — TELEPHONE ENCOUNTER
RN made 2nd attempt to contact patient, but no answer. Left message on patient's voice mail to call clinic back.    If patient calls back, he needs to triaged for ankle weakness or schedule an appointment.  Thanks    Sunita Polk RN  [facility]

## 2024-07-22 ENCOUNTER — TELEPHONE (OUTPATIENT)
Dept: OPHTHALMOLOGY | Facility: CLINIC | Age: 64
End: 2024-07-22
Payer: MEDICARE

## 2024-07-22 NOTE — TELEPHONE ENCOUNTER
Health Call Center    Phone Message    May a detailed message be left on voicemail: yes     Reason for Call: Medication Refill Request    Has the patient contacted the pharmacy for the refill? Yes   Name of medication being requested: prednisoLONE acetate (PRED FORTE) 1 % ophthalmic suspension  Provider who prescribed the medication: Dr Archibald  Pharmacy: [id number] - [place], [place] - [address] AT [facility]  Date medication is needed: ASAP     Patient calling in that he asked pharmacy for a refill and they denied it saying medication has been revoked. Patient also want to know the status of his corneal transplant. We were going to discuss a plan of care and call him back but no one reached out to patient.    Please call patient back at [phone]. Thank you.    Action Taken: Message routed to:  [facility]    Travel Screening: Not Applicable

## 2024-07-23 ENCOUNTER — PREP FOR PROCEDURE (OUTPATIENT)
Dept: OPHTHALMOLOGY | Facility: CLINIC | Age: 64
End: 2024-07-23
Payer: MEDICARE

## 2024-07-23 ENCOUNTER — TELEPHONE (OUTPATIENT)
Dept: OPHTHALMOLOGY | Facility: CLINIC | Age: 64
End: 2024-07-23
Payer: MEDICARE

## 2024-07-23 NOTE — TELEPHONE ENCOUNTER
Called patient to schedule surgery with , ,     Spoke with: Patients sister Leeanne    Date of Surgery:   11/11 (Left)     Patient aware of approximate arrival time: No aware RN will reach out to discuss     Location of surgery:  [facility] OR (Left)     Pre-Op H&P: [facility]     Informed patient that they need to call to schedule pre-op H&P with PCP within 30 days of surgery date: Yes      Post-Op Appt Dates:         11/12 - 915 11/22 - 845 12/13 - 845        11/12 - 900 11/20 - 800 12/11 - 1230              11/12 - 845 11/22 - 930 12/13 - 900            Discussed with patient pre-op RN will call 2-3 days prior to surgery with arrival time and instructions:  Yes       Standard Surgery Packet Sent: Yes 07/23/24  via Mail - Standard      Surgical Soap Discussed with Patient: Yes  - Received:  [facility]       Additional Information Sent in Packet: Post-op Appointment Itinerary      Informed patient that they will need an adult  to bring patient home from surgery: Yes  : Sister Leeanne         Additional Comments:        All patients questions were answered and was instructed to review surgical packet and call back [phone] with any questions or concerns.       Anna C. Schoenecker on 7/23/2024 at 1:34 PM

## 2024-07-23 NOTE — TELEPHONE ENCOUNTER
Does patient have another number? We have reached out a few times along with a [employer] message. Unable to reach     Anna C. Schoenecker on 7/23/2024 at 12:02 PM

## 2024-07-23 NOTE — TELEPHONE ENCOUNTER
RN made 3rd attempt to contact patient, but no answer. Left message on patient's voice mail to call clinic back.    Patient has an appointment scheduled on 8/2/24. If patient calls back, see if he needs a sooner appointment. Thanks    Sunita Polk RN  [facility]

## 2024-08-09 ENCOUNTER — MYC MEDICAL ADVICE (OUTPATIENT)
Dept: OPHTHALMOLOGY | Facility: CLINIC | Age: 64
End: 2024-08-09
Payer: MEDICARE

## 2024-08-12 NOTE — TELEPHONE ENCOUNTER
Duplicate messages to Dr. Hamilton, Dr. Archibald and Dr. Hager.    Triage will document in message to Dr. Hamilton.    Torito Griffith RN 8:12 AM 08/12/24

## 2024-08-14 NOTE — TELEPHONE ENCOUNTER
Attempted to reach patient. Left message for patient to call clinic at [phone] to confirm appointment or reschedule if needed.     Katie Degroot RN 3:53 PM 08/14/24

## 2024-08-14 NOTE — TELEPHONE ENCOUNTER
Attempted to reach patient x 3. Left message with clinic number to call back to confirm appointment time tomorrow.     Scheduled patient with Dr. Alonzo 8/15/2024 at 10:15 AM.     Will send portal message as well.     Katie Degroot RN 8:03 AM 08/14/24

## 2024-08-15 NOTE — TELEPHONE ENCOUNTER
Home [phone]    Spoke to pt at 1327    Scheduled next week Tuesday with Dr. Alonzo.    Pt asked me to call sister to review/confirm scheduling as sister provides transportation.    Spoke to sister (number in contact list) and confirmed appt for next Tuesday at 0920 with Dr. Alonzo.    Torito Griffith RN 1:35 PM 08/15/24

## 2024-08-18 ENCOUNTER — MYC MEDICAL ADVICE (OUTPATIENT)
Dept: OPHTHALMOLOGY | Facility: CLINIC | Age: 64
End: 2024-08-18
Payer: MEDICARE

## 2024-08-19 NOTE — TELEPHONE ENCOUNTER
I called pt and not able to reach.    Spoke to sister at 0830  (I confirmed appt for 8- last week with pt/sister)    Sister confirms aware of appt for tomorrow/August 20th.    Torito Griffith RN 8:33 AM 08/19/24

## 2024-09-17 ENCOUNTER — OFFICE VISIT (OUTPATIENT)
Dept: OPHTHALMOLOGY | Facility: CLINIC | Age: 64
End: 2024-09-17
Payer: MEDICARE

## 2024-09-17 DIAGNOSIS — H17.9 CORNEAL OPACITY OF LEFT EYE: ICD-10-CM

## 2024-09-17 DIAGNOSIS — H40.52X3 GLAUCOMA ASSOCIATED WITH OCULAR DISORDER, LEFT, SEVERE STAGE: ICD-10-CM

## 2024-09-17 DIAGNOSIS — Q13.81 AXENFELD-RIEGER SYNDROME: ICD-10-CM

## 2024-09-17 DIAGNOSIS — H40.9 ADVANCED STAGE GLAUCOMA: Primary | ICD-10-CM

## 2024-09-17 PROCEDURE — G0463 HOSPITAL OUTPT CLINIC VISIT: HCPCS

## 2024-09-17 PROCEDURE — 99213 OFFICE O/P EST LOW 20 MIN: CPT

## 2024-09-17 ASSESSMENT — TONOMETRY
OS_IOP_MMHG: 20
OS_IOP_MMHG: 23
IOP_METHOD: APPLANATION
IOP_METHOD: TONOPEN

## 2024-09-17 ASSESSMENT — VISUAL ACUITY
OD_SC: PROSTHETIC
METHOD: SNELLEN - LINEAR
OS_SC: LP

## 2024-09-17 ASSESSMENT — CONF VISUAL FIELD
OD_INFERIOR_TEMPORAL_RESTRICTION: 1
OD_SUPERIOR_TEMPORAL_RESTRICTION: 1
OD_INFERIOR_NASAL_RESTRICTION: 1
OD_SUPERIOR_NASAL_RESTRICTION: 1

## 2024-09-17 ASSESSMENT — SLIT LAMP EXAM - LIDS: COMMENTS: NORMAL

## 2024-09-17 NOTE — PATIENT INSTRUCTIONS
Continue Cosopt (Dark Blue) 1 drop 2 times a day on the left eye    Continue Brimonidine (Purple) 1 drop 2 times a day on left eye    Per Dr. Beasley Note: Continue Prednisolone Acetate (Pink or White Top) 1 drop 4 times a day on left eye.

## 2024-09-17 NOTE — PROGRESS NOTES
Chief Complaint/Presenting Concern: Pressure Check    History of Present Illness:   Bright Lockett is a 63 year old patient who presents for post op. Per chart note from [facility] on 07/07/23, he previously was followed by Dr. Ritter, but had not been on drops for years at that time. At that visit, his eye pressure was in the 30s. He has a history of glaucoma, Reiger's anomaly, and a cataract in the left eye.     Today 09/17/204: Reports good compliance with medication. Noticing some visual hallucinations after instilling Pred Acetate medication. Last for about 5 mins.    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 Dillon); Per patient, has had at least two surgeries in the left eye  CEIOL/Ahmed 12/18/23  Maximum intraocular pressure x/35 mmHg  Currently Meds: Timolol twice daily in left eye (previously at up to three eye drops)  Family history: positive: Possibly mother  CCT (um): 11/14/2023: x/555  Gonio: 11/14/2023:   Trauma history: negative  Steroid exposure: negative  Vasospastic disease: Migrane/Raynaud phenomenon: positive, Migraines  A past hemodynamic crisis or Low BP:: positive: History of GI  surgery to remove blockage, patient has to be resuscitated on table  Meds AEs/intolerance: None  PMHx: Heart failure, Chronic kidney disease  Anticoagulants: None  Previous testing:  Visual field November 14, 2023: Unable to obtain   OCT Optic Nerve RNFL Spectralis November 14, 2023  right eye: X  left eye: Unable to obtain adequate image    Prior testing:  Inferior haptic appears to be touching cornea with diffuse D-folds  Heme overlying optic    Additional Ocular History:   Enucleation, right eye (~1975)    Kisha's anomaly, left eye    Pseudophakia, left eye    Plan/Recommendations:  Discussed findings with patient.  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   Underwent CEIOL/JOYCE on 12/18/23 with post-op hyphema. IOP stable off drops   Inferior haptic appears displaced into the AC.  Patient seeing Dr. Hager today for follow up today. Patient may require DSEK + sutured IOL  Patient getting PKP on left eye in 11/11/2024.  Per Dr. Beasley previous note, Continue Pred Acetate QID left eye.  Dr. Hamilton saw 08/21/2024. Agrees that corneal edema is causing the tube to clog.  Today IOP Lower than previous exam  Continue Cosopt BID left eye  Continue Brimonidine BID left eye   Will follow-up after cornea transplant.     RTC in 2 month for VA and IOP.     Complete documentation of historical and exam elements from today's encounter can be found in the full encounter summary report (not reduplicated in this progress note). I personally obtained the chief complaint(s) and history of present illness. I confirmed and edited as necessary the review of systems, past medical/surgical history, family history, social history, and examination findings as document by others; and I examined the patient myself. I personally reviewed the relevant tests, images, and reports as documented above. I formulated and edited as necessary the assessment and plan and discussed the findings and  management plan with the patient and family.    Stanford Alonzo OD

## 2024-09-17 NOTE — NURSING NOTE
Chief Complaints and History of Present Illnesses   Patient presents with    Follow Up     1. Enucleation, right eye (~1975)  2. Kisha's anomaly, left eye  3. Pseudophakia, left eye       Chief Complaint(s) and History of Present Illness(es)       Follow Up              Laterality: left eye    Course: gradually worsening    Associated symptoms: Negative for eye pain    Comments: 1. Enucleation, right eye (~1975)  2. Kisha's anomaly, left eye  3. Pseudophakia, left eye                Comments    Pt reports he can no longer detect the track lighting in his living room. He's not sure if he can see the ambiance of them, however he can sense the ambiance of the light in the kitchen and bedroom. The bedroom light is a 30 watt light, while the living room light is LED and 60 gordillo. He denies discomfort.     - Continue Pred Acetate QID left eye  - Cosopt BID left eye  -  Brimonidine BID left eye     Leia Gray OA 2:55 PM September 17, 2024

## 2024-09-18 ENCOUNTER — OFFICE VISIT (OUTPATIENT)
Dept: FAMILY MEDICINE | Facility: CLINIC | Age: 64
End: 2024-09-18
Payer: MEDICARE

## 2024-09-18 VITALS
TEMPERATURE: 98 F | OXYGEN SATURATION: 97 % | BODY MASS INDEX: 24.96 KG/M2 | WEIGHT: 150 LBS | DIASTOLIC BLOOD PRESSURE: 65 MMHG | RESPIRATION RATE: 16 BRPM | SYSTOLIC BLOOD PRESSURE: 106 MMHG | HEART RATE: 52 BPM

## 2024-09-18 DIAGNOSIS — K21.00 GASTROESOPHAGEAL REFLUX DISEASE WITH ESOPHAGITIS WITHOUT HEMORRHAGE: ICD-10-CM

## 2024-09-18 DIAGNOSIS — R44.2 TACTILE HALLUCINATIONS: ICD-10-CM

## 2024-09-18 DIAGNOSIS — N18.31 STAGE 3A CHRONIC KIDNEY DISEASE (H): Primary | ICD-10-CM

## 2024-09-18 DIAGNOSIS — H61.23 BILATERAL IMPACTED CERUMEN: ICD-10-CM

## 2024-09-18 DIAGNOSIS — Z00.00 HEALTHCARE MAINTENANCE: ICD-10-CM

## 2024-09-18 DIAGNOSIS — I10 ESSENTIAL HYPERTENSION: Chronic | ICD-10-CM

## 2024-09-18 DIAGNOSIS — H17.9 CORNEAL OPACITY OF LEFT EYE: ICD-10-CM

## 2024-09-18 LAB — HGB BLD-MCNC: 14.9 G/DL (ref 13.3–17.7)

## 2024-09-18 PROCEDURE — 99214 OFFICE O/P EST MOD 30 MIN: CPT | Mod: 25 | Performed by: FAMILY MEDICINE

## 2024-09-18 PROCEDURE — 90662 IIV NO PRSV INCREASED AG IM: CPT | Performed by: FAMILY MEDICINE

## 2024-09-18 PROCEDURE — 36415 COLL VENOUS BLD VENIPUNCTURE: CPT | Performed by: FAMILY MEDICINE

## 2024-09-18 PROCEDURE — 83735 ASSAY OF MAGNESIUM: CPT | Performed by: FAMILY MEDICINE

## 2024-09-18 PROCEDURE — 90480 ADMN SARSCOV2 VAC 1/ONLY CMP: CPT | Performed by: FAMILY MEDICINE

## 2024-09-18 PROCEDURE — 85018 HEMOGLOBIN: CPT | Performed by: FAMILY MEDICINE

## 2024-09-18 PROCEDURE — 91320 SARSCV2 VAC 30MCG TRS-SUC IM: CPT | Performed by: FAMILY MEDICINE

## 2024-09-18 PROCEDURE — G0008 ADMIN INFLUENZA VIRUS VAC: HCPCS | Performed by: FAMILY MEDICINE

## 2024-09-18 PROCEDURE — 80048 BASIC METABOLIC PNL TOTAL CA: CPT | Performed by: FAMILY MEDICINE

## 2024-09-18 RX ORDER — METOPROLOL SUCCINATE 100 MG/1
100 TABLET, EXTENDED RELEASE ORAL DAILY
Qty: 90 TABLET | Refills: 3 | Status: SHIPPED | OUTPATIENT
Start: 2024-09-18

## 2024-09-18 NOTE — ASSESSMENT & PLAN NOTE
Increased symptoms lately drove him to increase omeprazole dose.  Discussed that I agree with this, tried for 3 weeks or so then back down to 20 daily.

## 2024-09-18 NOTE — PROGRESS NOTES
BP Readings from Last 6 Encounters:   09/18/24 106/65   05/28/24 99/52   04/29/24 (!) 155/73   04/23/24 120/62   03/15/24 111/62   12/18/23 (!) 187/84        Assessment & Plan   Problem List Items Addressed This Visit       Tactile hallucinations     Possible tactile hallucinations, feels a vibrating/buzzing symptom on his bed after he takes his clonidine close to the time he takes his Pred forte drops for his eye.  He has timed these separately now and has avoided having the sensation.  No auditory or visual hallucinations noted.  Will follow for now, continue to manage medicine as he is.         Stage 3a chronic kidney disease (H) - Primary     Recheck BMP, on spironolactone         Relevant Orders    Basic metabolic panel  (Ca, Cl, CO2, Creat, Gluc, K, Na, BUN)    Magnesium    Hemoglobin (Completed)    Healthcare maintenance     Flu and COVID shots given today         Essential hypertension (Chronic)     Overtreated, low pulse, back off on metoprolol from 200-100, recheck blood pressure/pulse at time of preop in mid November         Relevant Medications    metoprolol succinate ER (TOPROL XL) 100 MG 24 hr tablet    Esophageal reflux (Chronic)     Increased symptoms lately drove him to increase omeprazole dose.  Discussed that I agree with this, tried for 3 weeks or so then back down to 20 daily.         Corneal opacity of left eye     Only functioning eye, ophthalmology is working to get pressures down and planning corneal transplant coming up in November.          Other Visit Diagnoses       Bilateral impacted cerumen                            Gabriela Novoa is a 64 year old, presenting for the following health issues:  Follow Up      9/18/2024     4:33 PM   Additional Questions   Roomed by antonio     History of Present Illness       Hyperlipidemia:  He presents for follow up of hyperlipidemia.   He is taking medication to lower cholesterol. He is not having myalgia or other side effects to statin  medications.    Hypertension: He presents for follow up of hypertension.  He does check blood pressure  regularly outside of the clinic. Outside blood pressures have been over 140/90. He follows a low salt diet.     Reason for visit:  Medication and physical    He eats 0-1 servings of fruits and vegetables daily.He consumes 0 sweetened beverage(s) daily.He exercises with enough effort to increase his heart rate 9 or less minutes per day.  He exercises with enough effort to increase his heart rate 3 or less days per week.   He is taking medications regularly.     Patient here for follow-up from visit 5/28/2024.  Has been diagnosed with hyperaldosteronism, referred to endocrinology, no visit to my knowledge at this point.  Started on spironolactone, low-dose, blood pressures have been better.  Has noted some blood pressures below 100.  Does not feel particularly dizzy with these.        Objective    /65 (BP Location: Left arm, Patient Position: Sitting, Cuff Size: Adult Regular)   Pulse 52   Temp 98  F (36.7  C) (Temporal)   Resp 16   Wt 68 kg (150 lb)   SpO2 97%   BMI 24.96 kg/m    Body mass index is 24.96 kg/m .  Physical Exam   Very little lower extremity edema.  Cerumen adhered to right TM, left TM and ear canal appear normal, left corneal opacity,  Chest clear, cardiac exam normal.    Signed Electronically by: Ladarius Soria MD

## 2024-09-18 NOTE — ASSESSMENT & PLAN NOTE
Possible tactile hallucinations, feels a vibrating/buzzing symptom on his bed after he takes his clonidine close to the time he takes his Pred forte drops for his eye.  He has timed these separately now and has avoided having the sensation.  No auditory or visual hallucinations noted.  Will follow for now, continue to manage medicine as he is.

## 2024-09-18 NOTE — ASSESSMENT & PLAN NOTE
Overtreated, low pulse, back off on metoprolol from 200-100, recheck blood pressure/pulse at time of preop in mid November

## 2024-09-18 NOTE — ASSESSMENT & PLAN NOTE
Only functioning eye, ophthalmology is working to get pressures down and planning corneal transplant coming up in November.

## 2024-09-19 LAB
ANION GAP SERPL CALCULATED.3IONS-SCNC: 14 MMOL/L (ref 7–15)
BUN SERPL-MCNC: 39.7 MG/DL (ref 8–23)
CALCIUM SERPL-MCNC: 9 MG/DL (ref 8.8–10.4)
CHLORIDE SERPL-SCNC: 102 MMOL/L (ref 98–107)
CREAT SERPL-MCNC: 1.95 MG/DL (ref 0.67–1.17)
EGFRCR SERPLBLD CKD-EPI 2021: 37 ML/MIN/1.73M2
GLUCOSE SERPL-MCNC: 116 MG/DL (ref 70–99)
HCO3 SERPL-SCNC: 22 MMOL/L (ref 22–29)
MAGNESIUM SERPL-MCNC: 2.1 MG/DL (ref 1.7–2.3)
POTASSIUM SERPL-SCNC: 4.9 MMOL/L (ref 3.4–5.3)
SODIUM SERPL-SCNC: 138 MMOL/L (ref 135–145)

## 2024-09-23 ENCOUNTER — TELEPHONE (OUTPATIENT)
Dept: FAMILY MEDICINE | Facility: CLINIC | Age: 64
End: 2024-09-23
Payer: MEDICARE

## 2024-09-23 NOTE — TELEPHONE ENCOUNTER
Tried calling pt, no answer, unable to leave message on vm as vm repeats over again not allowing caller to LM. Spoke to pt's sister Leeanne. RN requested Leeanne to passed message to pt to call [facility] and ask to speak to a nurse regarding his kidney health and medication changes.   Leeanne verb she will email pt and his partner.        Deborah MCCALL RN  [facility]         ----- Message from Ladarius Soria sent at 9/23/2024  8:28 AM CDT -----  Please let peter know his kidney filtrationis slowed down  This is likely because his bp is too low right now  We decreased the dose of his bp meds  We should have him come in for extra bp check and do a lab only at the same time- in about 2 weeks  Please schedule  Orders in

## 2024-09-24 ENCOUNTER — TELEPHONE (OUTPATIENT)
Dept: FAMILY MEDICINE | Facility: CLINIC | Age: 64
End: 2024-09-24

## 2024-09-24 NOTE — TELEPHONE ENCOUNTER
Scheduling ,    DR Soria has asked that pt make appt in two weeks for labwork and blood pressure check  Pt was made aware of this and he has asked staff to call his sister Leeanne Mathis to make this appt as he is reliant on her for rides and anytime that is good for her is good for him .  No need to fast    :Please call Leeanne BUTLER  RN, BSN  [facility]

## 2024-09-24 NOTE — LETTER
September 27, 2024      Bright Lockett  [address] [place] [address]        As a valued [facility] patient, your healthcare needs are our priority.    Your health care team has determined that you are due for an appointment to follow up on lab work and blood pressure .   We encourage you to call or schedule an appointment with your primary care provider to discuss your overdue screening and schedule an appointment.     If you already have had your screening performed at another health care facility, please ask that practice to send your results to [facility] [phone] and we will update your health records. This will ensure you receive the best possible care from our providers.      If you have any questions or need help with scheduling, please call the [facility] at [phone].        Yours in health,       Your care team at [facility]

## 2024-09-24 NOTE — TELEPHONE ENCOUNTER
Blind and roommate helps with reading instructions so he handed phone to him and message discussed with both roommate and pt    Pt would like to know the range he should be in ?      (Pt asked that his sister be called to arrange the appts so this message was sent separately to ask support staff to call his sister tomorrow)    Eli BUTLER RN, BSN  [facility]

## 2024-09-25 NOTE — TELEPHONE ENCOUNTER
Dr Soria,    I apologize I wasn't clear    He is looking for the BP range you want for him    Eli BUTLER RN, BSN  [facility]

## 2024-09-25 NOTE — TELEPHONE ENCOUNTER
Cannot call sister, no C2C on file. LMTCB #1. Postponing task to tomorrow to try again. If patient returns call back, please help patient schedule an appointment per message below. Thanks!

## 2024-09-25 NOTE — TELEPHONE ENCOUNTER
Glomerular filtration rate is 37, this is an estimate.  Filtration rate should be more than 70.  Real problems, when this drops below 10.

## 2024-09-27 ENCOUNTER — LAB (OUTPATIENT)
Dept: LAB | Facility: CLINIC | Age: 64
End: 2024-09-27
Payer: MEDICARE

## 2024-09-27 ENCOUNTER — OFFICE VISIT (OUTPATIENT)
Dept: UROLOGY | Facility: CLINIC | Age: 64
End: 2024-09-27
Payer: MEDICARE

## 2024-09-27 VITALS — SYSTOLIC BLOOD PRESSURE: 112 MMHG | HEART RATE: 54 BPM | DIASTOLIC BLOOD PRESSURE: 69 MMHG | OXYGEN SATURATION: 100 %

## 2024-09-27 DIAGNOSIS — N40.1 BENIGN PROSTATIC HYPERPLASIA WITH LOWER URINARY TRACT SYMPTOMS, SYMPTOM DETAILS UNSPECIFIED: Primary | ICD-10-CM

## 2024-09-27 DIAGNOSIS — R35.0 URINARY FREQUENCY: ICD-10-CM

## 2024-09-27 DIAGNOSIS — N18.31 STAGE 3A CHRONIC KIDNEY DISEASE (H): Primary | ICD-10-CM

## 2024-09-27 LAB
ANION GAP SERPL CALCULATED.3IONS-SCNC: 11 MMOL/L (ref 7–15)
BUN SERPL-MCNC: 28.7 MG/DL (ref 8–23)
CALCIUM SERPL-MCNC: 9.6 MG/DL (ref 8.8–10.4)
CHLORIDE SERPL-SCNC: 101 MMOL/L (ref 98–107)
CREAT SERPL-MCNC: 1.79 MG/DL (ref 0.67–1.17)
EGFRCR SERPLBLD CKD-EPI 2021: 41 ML/MIN/1.73M2
ERYTHROCYTE [DISTWIDTH] IN BLOOD BY AUTOMATED COUNT: 12.6 % (ref 10–15)
GLUCOSE SERPL-MCNC: 91 MG/DL (ref 70–99)
HCO3 SERPL-SCNC: 26 MMOL/L (ref 22–29)
HCT VFR BLD AUTO: 50.1 % (ref 40–53)
HGB BLD-MCNC: 16.8 G/DL (ref 13.3–17.7)
MCH RBC QN AUTO: 29.2 PG (ref 26.5–33)
MCHC RBC AUTO-ENTMCNC: 33.5 G/DL (ref 31.5–36.5)
MCV RBC AUTO: 87 FL (ref 78–100)
PLATELET # BLD AUTO: 184 10E3/UL (ref 150–450)
POTASSIUM SERPL-SCNC: 4.4 MMOL/L (ref 3.4–5.3)
RBC # BLD AUTO: 5.76 10E6/UL (ref 4.4–5.9)
SODIUM SERPL-SCNC: 138 MMOL/L (ref 135–145)
WBC # BLD AUTO: 5.1 10E3/UL (ref 4–11)

## 2024-09-27 PROCEDURE — 99213 OFFICE O/P EST LOW 20 MIN: CPT | Mod: 25 | Performed by: UROLOGY

## 2024-09-27 PROCEDURE — 51798 US URINE CAPACITY MEASURE: CPT | Performed by: UROLOGY

## 2024-09-27 PROCEDURE — 36415 COLL VENOUS BLD VENIPUNCTURE: CPT

## 2024-09-27 PROCEDURE — 80048 BASIC METABOLIC PNL TOTAL CA: CPT

## 2024-09-27 PROCEDURE — 85027 COMPLETE CBC AUTOMATED: CPT

## 2024-09-27 NOTE — TELEPHONE ENCOUNTER
LMTCB #3. If patient calls back, please assist with scheduling appointment per notes below. Thank you! Sending out letter at this time and postponing task for 2 weeks.

## 2024-09-27 NOTE — PROGRESS NOTES
No chief complaint on file.      Bright Lockett is a 64 year old male who presents today for follow up of No chief complaint on file.  Patient is a pleasant 64-year-old gentleman with history of lower urinary tract symptoms due to BPH.  He is currently on oxybutynin tamsulosin and finasteride.  His symptom score have improved since starting on these medications.  His AUA score is 15.  He has some urgency and nocturia x 3.  He drinks about 4 glasses of fluid daily.    Current Outpatient Medications   Medication Sig Dispense Refill    atorvastatin (LIPITOR) 20 MG tablet Take 1 tablet (20 mg) by mouth at bedtime 90 tablet 3    cloNIDine (CATAPRES) 0.1 MG tablet Take 2 tablets (0.2 mg) by mouth 2 times daily 360 tablet 2    dorzolamide-timolol (COSOPT) 2-0.5 % ophthalmic solution Place 1 drop Into the left eye 2 times daily 10 mL 11    finasteride (PROSCAR) 5 MG tablet TAKE ONE TABLET BY MOUTH ONCE DAILY 90 tablet 3    furosemide (LASIX) 20 MG tablet Take 1 tablet (20 mg) by mouth daily 90 tablet 2    losartan (COZAAR) 100 MG tablet TAKE 1 TABLET(100 MG) BY MOUTH DAILY 90 tablet 3    metoprolol succinate ER (TOPROL XL) 100 MG 24 hr tablet Take 1 tablet (100 mg) by mouth daily. 90 tablet 3    omeprazole (PRILOSEC) 20 MG DR capsule Take 1 capsule (20 mg) by mouth daily 90 capsule 2    oxyBUTYnin (DITROPAN) 5 MG tablet Take 1 tablet (5 mg) by mouth at bedtime 90 tablet 0    potassium chloride ER (K-TAB) 20 MEQ CR tablet Take 20 mEq by mouth      prednisoLONE acetate (PRED FORTE) 1 % ophthalmic suspension Place 1-2 drops Into the left eye 3 times daily      spironolactone (ALDACTONE) 25 MG tablet Take 1 tablet (25 mg) by mouth daily 90 tablet 3    tamsulosin (FLOMAX) 0.4 MG capsule TAKE 1 CAPSULE BY MOUTH DAILY AFTER BREAKFAST. 90 capsule 3    brimonidine (ALPHAGAN) 0.2 % ophthalmic solution Place 1 drop Into the left eye 2 times daily. 10 mL 11    metoprolol succinate ER (TOPROL XL) 200 MG 24 hr tablet TAKE 1 TABLET(200  MG) BY MOUTH DAILY (Patient not taking: Reported on 9/27/2024) 90 tablet 3    prednisoLONE acetate (PRED FORTE) 1 % ophthalmic suspension Place 1-2 drops Into the left eye 4 times daily 10 mL 4     Allergies   Allergen Reactions    Morphine Nausea and Vomiting      Past Medical History:   Diagnosis Date    Aortic sclerosis     Back pain     Benign neoplasm of adenomatous polyp     of the large intestine    Carpal tunnel syndrome     Cerebral palsy (H)     Diverticulosis     Esophageal reflux     Glaucoma     Hyperlipidemia     Hypertension     Left ventricular hypertrophy     Leg weakness     left    Neuritis     Osteoarthritis of knee      Past Surgical History:   Procedure Laterality Date    IMPLANT VALVE EYE Left 12/18/2023    Procedure: INSERTION, GLAUCOMA SHUNT IMPLANT, EYE - Left;  Surgeon: Flori Hamilton MD;  Location: [facility] OR    PHACOEMULSIFICATION WITH STANDARD INTRAOCULAR LENS IMPLANT Left 12/18/2023    Procedure: LEFT - Phacoemulsificaiton  CATARACT,  WITH INTRAOCULAR LENS IMPLANT INSERTION, CTR, iridectomy;  Surgeon: Dre Layton MD;  Location: [facility] OR    REPOSITION INTRAOCULAR LENS Left 4/29/2024    Procedure: LEFT INTRAOCULAR REPOSITIONING, ANTERIOR CHAMBER REFORMATION;  Surgeon: Dre Layton MD;  Location: [facility] OR    VITRECTOMY ANTERIOR Left 4/29/2024    Procedure: LEFT ANTERIOR VITRECTOMY;  Surgeon: Dre Layton MD;  Location: [facility] OR    ZZC EXPLORATORY OF ABDOMEN      Description: Exploratory Laparotomy;  Recorded: 09/25/2008;  Comments: small bowel obstruction     Family History   Problem Relation Age of Onset    Glaucoma Mother     Alcoholism Mother     Dementia Mother     Arthritis Mother     Cancer Paternal Grandmother     Arthritis Sister     Arthritis Maternal Aunt     Arthritis Maternal Uncle     Arthritis Paternal Aunt     Arthritis Paternal Uncle     Macular Degeneration No family hx of      Social History     Socioeconomic History    Marital status:  Single   Tobacco Use    Smoking status: Never     Passive exposure: Never    Smokeless tobacco: Never   Vaping Use    Vaping status: Never Used   Substance and Sexual Activity    Alcohol use: No    Drug use: No     Social Determinants of Health     Financial Resource Strain: Low Risk  (5/28/2024)    Financial Resource Strain     Within the past 12 months, have you or your family members you live with been unable to get utilities (heat, electricity) when it was really needed?: No   Food Insecurity: Low Risk  (5/28/2024)    Food Insecurity     Within the past 12 months, did you worry that your food would run out before you got money to buy more?: No     Within the past 12 months, did the food you bought just not last and you didn t have money to get more?: No   Transportation Needs: High Risk (5/28/2024)    Transportation Needs     Within the past 12 months, has lack of transportation kept you from medical appointments, getting your medicines, non-medical meetings or appointments, work, or from getting things that you need?: Yes   Physical Activity: Inactive (5/28/2024)    Exercise Vital Sign     Days of Exercise per Week: 0 days     Minutes of Exercise per Session: 0 min   Stress: No Stress Concern Present (5/28/2024)    [employer] [employer] - Occupational Stress Questionnaire     Feeling of Stress : Not at all   Social Connections: Unknown (5/28/2024)    Social Connection and Isolation Panel [NHANES]     Frequency of Social Gatherings with Friends and Family: Once a week   Interpersonal Safety: Low Risk  (4/23/2024)    Interpersonal Safety     Do you feel physically and emotionally safe where you currently live?: Yes     Within the past 12 months, have you been hit, slapped, kicked or otherwise physically hurt by someone?: No     Within the past 12 months, have you been humiliated or emotionally abused in other ways by your partner or ex-partner?: No   Housing Stability: Low Risk  (5/28/2024)     Housing Stability     Do you have housing? : Yes     Are you worried about losing your housing?: No       REVIEW OF SYSTEMS  =================  C: NEGATIVE for fever, chills, change in weight  I: NEGATIVE for worrisome rashes, moles or lesions  E/M: NEGATIVE for ear, mouth and throat problems  R: NEGATIVE for significant cough or SHORTNESS OF BREATH  CV:  NEGATIVE for chest pain, palpitations or peripheral edema  GI: NEGATIVE for nausea, abdominal pain, heartburn, or change in bowel habits  NEURO: NEGATIVE numbness/weakness  : see HPI  PSYCH: NEGATIVE depression/anxiety  LYmph: no new enlarged lymph nodes  Ortho: no new trauma/movements    Physical Exam:  Blood pressure 112/69, pulse 54, SpO2 100%.    GENERAL: healthy, alert and no distress  EYES: Eyes grossly normal to inspection, conjunctivae and sclerae normal  RESP: no audible wheeze, cough, or visible cyanosis.  No visible retractions or increased work of breathing.  Able to speak fully in complete sentences.  NEURO: Cranial nerves grossly intact, mentation intact and speech normal  PSYCH: mentation appears normal, affect normal/bright, judgement and insight intact, normal speech and appearance well-groomed    Assessment/Plan:   (N40.1) Benign prostatic hyperplasia with lower urinary tract symptoms, symptom details unspecified  (primary encounter diagnosis)  Comment: Continue with current medications.  Discussed restricting fluid intake prior to bedtime but patient seems to be very resistant to it.  Plan: MEASURE POST-VOID RESIDUAL URINE/BLADDER         CAPACITY, US NON-IMAGING (08097)             (R35.0) Urinary frequency  Comment:    Plan: MEASURE POST-VOID RESIDUAL URINE/BLADDER         CAPACITY, US NON-IMAGING (62870)         Continue with current meds.  See me in 1 year.

## 2024-09-27 NOTE — PROGRESS NOTES
Writer went to give patient information on REZUM, however pt had left the room. Info attached to AVS on REZUM.     Nery THRASHER RN   [facility], Urology   9/27/2024 10:36 AM

## 2024-09-27 NOTE — PROGRESS NOTES
GOING 3 TIMES A DAY AND 3 TIMES A NIGHT   4 WATER  Occasional MILK    Bladder scan performed 72ml   detected in bladder. Anjali Irene, CMA

## 2024-09-27 NOTE — PATIENT INSTRUCTIONS
Jefry?mTM Water Vapor Therapy Patient  Instructions Pre Treatment    What is the Rezum procedure?   Rezum is a procedure to treat benign prostatic hyperplasia (BPH) that can be performed in a clinic or outpatient setting. Rezum uses targeted, controlled doses of thermal energy in water vapor (steam) to treat the extra prostate tissue that is causing symptoms such as frequency, urgency, irregular flow, weak stream, straining to urinate and frequent nighttime urination.     **To learn more about REZUM therapy and how it can safely and effectively reduce your BPH symptoms, visit: [email or web address]    What happens on the day of the Rezum procedure?   On the day of the procedure, a light meal and lots of fluids are recommended. The procedure itself is quick and simple, taking only a single office visit. Depending on the size of the prostate, the procedure consists of two to seven injections, each nine seconds long. Upon arriving, you will be instructed to eliminate urine, and the bladder is then checked. Both the prostate and the penis are numbed. The Jefry?m device is inserted, a needle is deployed, and vapor is injected into the prostate for nine seconds. This vapor disperses between cells, then cools, releases heat, and gently disrupts the prostate's cells. Because of the initial swelling, a catheter is then inserted, which will remain for 7 days.    Rezum appointment information    Start the antibiotics THE DAY BEFORE THE PROCEDURE. Continue taking until they are gone.    2. Start the IBUPROFEN THE EVENING OF THE PROCEDURE. Take 800mg three times per day for 14 days.    3. Purchase a Fleets  enema at your local pharmacy and administer this 1 to 2 hours prior to your scheduled appointment time.    4. You may possibly need to leave a urine specimen when you arrive at the office.    5. Please follow these instructions for holding blood thinning medications:    Vitamin E, fish oil, aspirin, baby aspirin --7  days prior    Plavix, Brilinta, ibuprofen (Advil, Motrin, Aleve) - 7 days prior    Pradaxa - 5 days prior    Coumadin/warfarin - Consult your prescribing provider before holding.    Eliquis, Xarelto - Consult your prescribing provider before holding.      POST REZUM APPOINTMENTS    RN for palumbo catheter removal- 7-10 days after your procedure    Dr. Gonzales- 3 month follow up appointment    How long will the procedure take?  The Rezum procedure will take about 30 minutes. Please allow 1-2 hours for the appointment (including pre and post).    If you have any questions call Nery directly at [phone].

## 2024-09-27 NOTE — TELEPHONE ENCOUNTER
Called and spoke to patient's sister Leeanne (CTC on file). Relayed clinician's message below. Pt verbalized understanding. She will try to contact patient.    Called patient. No answer. LVM to call clinic. Please relay clinician's message upon return call. MyChart message sent.    Manuel HUTCHINS RN  [facility]

## 2024-09-30 DIAGNOSIS — N40.1 BENIGN PROSTATIC HYPERPLASIA WITH LOWER URINARY TRACT SYMPTOMS, SYMPTOM DETAILS UNSPECIFIED: ICD-10-CM

## 2024-10-01 RX ORDER — FINASTERIDE 5 MG/1
TABLET, FILM COATED ORAL
Qty: 90 TABLET | Refills: 3 | OUTPATIENT
Start: 2024-10-01

## 2024-10-02 ENCOUNTER — ALLIED HEALTH/NURSE VISIT (OUTPATIENT)
Dept: FAMILY MEDICINE | Facility: CLINIC | Age: 64
End: 2024-10-02
Payer: MEDICARE

## 2024-10-02 VITALS — SYSTOLIC BLOOD PRESSURE: 100 MMHG | DIASTOLIC BLOOD PRESSURE: 50 MMHG | HEART RATE: 42 BPM

## 2024-10-02 DIAGNOSIS — I10 ESSENTIAL HYPERTENSION: Primary | Chronic | ICD-10-CM

## 2024-10-02 PROCEDURE — 99207 PR NO CHARGE NURSE ONLY: CPT

## 2024-10-02 NOTE — PROGRESS NOTES
I met with Bright Lockett at the request of Dr Soria to recheck his blood pressure.  Blood pressure medications on the med list were reviewed with patient.    Patient has taken all medications as per usual regimen: Yes  Patient reports tolerating them without any issues or concerns: Yes    Vitals:    10/02/24 1346 10/02/24 1353   BP: 110/53 100/50   BP Location: Left arm Left arm   Patient Position: Sitting Sitting   Cuff Size: Adult Regular Adult Regular   Pulse: 53 (!) 42       Blood pressure was taken, previous encounter was reviewed, recorded blood pressure below 140/90.  Patient was discharged and the note will be sent to the provider for final review.     Deborah MCCALL RN  [facility]

## 2024-10-11 ENCOUNTER — MYC REFILL (OUTPATIENT)
Dept: FAMILY MEDICINE | Facility: CLINIC | Age: 64
End: 2024-10-11
Payer: MEDICARE

## 2024-10-11 ENCOUNTER — MYC REFILL (OUTPATIENT)
Dept: OPHTHALMOLOGY | Facility: CLINIC | Age: 64
End: 2024-10-11
Payer: MEDICARE

## 2024-10-11 DIAGNOSIS — N40.1 BENIGN PROSTATIC HYPERPLASIA WITH LOWER URINARY TRACT SYMPTOMS, SYMPTOM DETAILS UNSPECIFIED: ICD-10-CM

## 2024-10-11 DIAGNOSIS — H40.52X3 GLAUCOMA ASSOCIATED WITH OCULAR DISORDER, LEFT, SEVERE STAGE: ICD-10-CM

## 2024-10-11 DIAGNOSIS — H40.9 ADVANCED STAGE GLAUCOMA: ICD-10-CM

## 2024-10-11 RX ORDER — BRIMONIDINE TARTRATE 2 MG/ML
1 SOLUTION/ DROPS OPHTHALMIC 2 TIMES DAILY
Qty: 10 ML | Refills: 5 | Status: SHIPPED | OUTPATIENT
Start: 2024-10-11 | End: 2024-10-18

## 2024-10-13 RX ORDER — FINASTERIDE 5 MG/1
TABLET, FILM COATED ORAL
Qty: 90 TABLET | Refills: 3 | OUTPATIENT
Start: 2024-10-13

## 2024-10-18 ENCOUNTER — MYC REFILL (OUTPATIENT)
Dept: OPHTHALMOLOGY | Facility: CLINIC | Age: 64
End: 2024-10-18
Payer: MEDICARE

## 2024-10-18 ENCOUNTER — MYC REFILL (OUTPATIENT)
Dept: FAMILY MEDICINE | Facility: CLINIC | Age: 64
End: 2024-10-18
Payer: MEDICARE

## 2024-10-18 DIAGNOSIS — R60.9 EDEMA, UNSPECIFIED TYPE: ICD-10-CM

## 2024-10-18 DIAGNOSIS — K21.9 GASTROESOPHAGEAL REFLUX DISEASE WITHOUT ESOPHAGITIS: ICD-10-CM

## 2024-10-18 DIAGNOSIS — I10 ESSENTIAL HYPERTENSION: ICD-10-CM

## 2024-10-18 DIAGNOSIS — H40.9 ADVANCED STAGE GLAUCOMA: ICD-10-CM

## 2024-10-18 DIAGNOSIS — H40.52X3 GLAUCOMA ASSOCIATED WITH OCULAR DISORDER, LEFT, SEVERE STAGE: ICD-10-CM

## 2024-10-20 RX ORDER — POTASSIUM CHLORIDE 1500 MG/1
20 TABLET, EXTENDED RELEASE ORAL
OUTPATIENT
Start: 2024-10-20

## 2024-10-20 RX ORDER — FUROSEMIDE 20 MG/1
20 TABLET ORAL DAILY
Qty: 90 TABLET | Refills: 2 | OUTPATIENT
Start: 2024-10-20

## 2024-10-20 NOTE — TELEPHONE ENCOUNTER
Clinic RN: Please contact patient because the medication is listed as historical or discontinued. Confirm patient is taking this medication. Document findings and route refill encounter to provider for approval or denial.

## 2024-10-21 RX ORDER — BRIMONIDINE TARTRATE 2 MG/ML
1 SOLUTION/ DROPS OPHTHALMIC 2 TIMES DAILY
Qty: 10 ML | Refills: 5 | Status: SHIPPED | OUTPATIENT
Start: 2024-10-21

## 2024-10-21 RX ORDER — DORZOLAMIDE HYDROCHLORIDE AND TIMOLOL MALEATE 20; 5 MG/ML; MG/ML
1 SOLUTION/ DROPS OPHTHALMIC 2 TIMES DAILY
Qty: 10 ML | Refills: 5 | Status: SHIPPED | OUTPATIENT
Start: 2024-10-21

## 2024-10-21 NOTE — TELEPHONE ENCOUNTER
Last visit 9-    Rx's for cosopt and Brimonidine sent per request.    Torito Griffith RN 8:04 AM 10/21/24

## 2024-11-04 RX ORDER — POTASSIUM CHLORIDE 1500 MG/1
20 TABLET, EXTENDED RELEASE ORAL DAILY
Qty: 90 TABLET | Refills: 3 | Status: SHIPPED | OUTPATIENT
Start: 2024-11-04

## 2024-11-07 ENCOUNTER — OFFICE VISIT (OUTPATIENT)
Dept: FAMILY MEDICINE | Facility: CLINIC | Age: 64
End: 2024-11-07
Payer: MEDICARE

## 2024-11-07 VITALS
WEIGHT: 143 LBS | BODY MASS INDEX: 23.8 KG/M2 | SYSTOLIC BLOOD PRESSURE: 123 MMHG | DIASTOLIC BLOOD PRESSURE: 73 MMHG | RESPIRATION RATE: 14 BRPM | TEMPERATURE: 97.8 F | HEART RATE: 46 BPM | OXYGEN SATURATION: 97 %

## 2024-11-07 DIAGNOSIS — Z23 NEED FOR SHINGLES VACCINE: ICD-10-CM

## 2024-11-07 DIAGNOSIS — E78.00 HYPERCHOLESTEROLEMIA: ICD-10-CM

## 2024-11-07 DIAGNOSIS — I50.32 CHRONIC HEART FAILURE WITH PRESERVED EJECTION FRACTION (H): ICD-10-CM

## 2024-11-07 DIAGNOSIS — Z01.818 PREOP EXAMINATION: Primary | ICD-10-CM

## 2024-11-07 DIAGNOSIS — I10 ESSENTIAL HYPERTENSION: Chronic | ICD-10-CM

## 2024-11-07 DIAGNOSIS — Z29.11 NEED FOR VACCINATION AGAINST RESPIRATORY SYNCYTIAL VIRUS: ICD-10-CM

## 2024-11-07 DIAGNOSIS — N18.31 STAGE 3A CHRONIC KIDNEY DISEASE (H): ICD-10-CM

## 2024-11-07 LAB — HGB BLD-MCNC: 15.3 G/DL (ref 13.3–17.7)

## 2024-11-07 PROCEDURE — 93010 ELECTROCARDIOGRAM REPORT: CPT | Mod: OFF | Performed by: GENERAL ACUTE CARE HOSPITAL

## 2024-11-07 PROCEDURE — 93005 ELECTROCARDIOGRAM TRACING: CPT | Performed by: FAMILY MEDICINE

## 2024-11-07 PROCEDURE — 36415 COLL VENOUS BLD VENIPUNCTURE: CPT | Performed by: FAMILY MEDICINE

## 2024-11-07 PROCEDURE — 99214 OFFICE O/P EST MOD 30 MIN: CPT | Performed by: FAMILY MEDICINE

## 2024-11-07 PROCEDURE — 85018 HEMOGLOBIN: CPT | Performed by: FAMILY MEDICINE

## 2024-11-07 PROCEDURE — 80048 BASIC METABOLIC PNL TOTAL CA: CPT | Performed by: FAMILY MEDICINE

## 2024-11-07 PROCEDURE — 80061 LIPID PANEL: CPT | Performed by: FAMILY MEDICINE

## 2024-11-07 NOTE — PROGRESS NOTES
Preoperative Evaluation  [facility] [address] [place] [address]  Phone: [phone]  Fax: [phone]  Primary Provider: Ladarius Soria MD  Pre-op Performing Provider: Ladarius Soria MD  Nov 7, 2024 11/7/2024   Surgical Information   What procedure is being done? eye surgery cornea transplant    Facility or Hospital where procedure/surgery will be performed: [facility]    Who is doing the procedure / surgery? three surgeons    Date of surgery / procedure: [id number]    Time of surgery / procedure: ?    Where do you plan to recover after surgery? at home with family        Patient-reported     Fax number for surgical facility: Note does not need to be faxed, will be available electronically in Epic.        Gabriela Novoa is a 64 year old, presenting for the following:  Pre-Op Exam (/)          11/7/2024     2:24 PM   Additional Questions   Roomed by Tia   Accompanied by Sister     HPI related to upcoming procedure: 64-year-old, enucleated right eye, left eye with corneal opacity.  Planned corneal transplants.  Medical history of cerebral palsy, difficult to control hypertension, heart failure with preserved ejection fraction, BPH,  Patient in his usual state of health, feeling well.  Denies shortness of breath, lightheadedness (occasional vertigo) and lower extremity edema is well-controlled.      11/7/2024   Pre-Op Questionnaire   Have you ever had a heart attack or stroke? No    Have you ever had surgery on your heart or blood vessels, such as a stent placement, a coronary artery bypass, or surgery on an artery in your head, neck, heart, or legs? No    Do you have chest pain with activity? No    Do you have a history of heart failure? No    Do you currently have a cold, bronchitis or symptoms of other infection? No    Do you have a cough, shortness of breath, or wheezing? No    Do you or anyone in your family have previous history of blood clots? No    Do you  or does anyone in your family have a serious bleeding problem such as prolonged bleeding following surgeries or cuts? No    Have you ever had problems with anemia or been told to take iron pills? No    Have you had any abnormal blood loss such as black, tarry or bloody stools? No    Have you ever had a blood transfusion? (!) UNKNOWN    Are you willing to have a blood transfusion if it is medically needed before, during, or after your surgery? Yes    Have you or any of your relatives ever had problems with anesthesia? No    Do you have sleep apnea, excessive snoring or daytime drowsiness? No    Do you have any artifical heart valves or other implanted medical devices like a pacemaker, defibrillator, or continuous glucose monitor? No    Do you have artificial joints? No    Are you allergic to latex? No        Patient-reported       Patient Active Problem List    Diagnosis Date Noted    Tactile hallucinations 09/18/2024     Priority: Medium    Corneal opacity of left eye 05/28/2024     Priority: Medium    Abnormal aldosterone to renin ratio 05/01/2024     Priority: Medium    Hearing loss, unspecified hearing loss type, unspecified laterality 12/05/2023     Priority: Medium    Left ventricular hypertrophy 07/14/2022     Priority: Medium     Formatting of this note might be different from the original.  Created by Conversion      Last Assessment & Plan:   Formatting of this note might be different from the original.  Noted on echocardiogram again, probably some diastolic dysfunction.  Discussed importance of good blood pressure control.      Hypertensive heart and chronic kidney disease with heart failure and stage 1 through stage 4 chronic kidney disease, or unspecified chronic kidney disease (H) 07/14/2022     Priority: Medium    Heart failure with preserved ejection fraction (H) 06/10/2022     Priority: Medium    Edema, unspecified type 03/31/2021     Priority: Medium    Essential hypertension      Priority: Medium      Created by Conversion  Replacement Utility updated for latest IMO load        Cervical lymphadenopathy 12/30/2020     Priority: Medium    Hyperglycemia 12/30/2020     Priority: Medium    Stage 3a chronic kidney disease (H) 12/30/2020     Priority: Medium    Hypercholesterolemia      Priority: Medium     16.1% 10-year risk based on 6/18/2019 calculation        Left Ventricular Hypertrophy      Priority: Medium     Created by Conversion        Daytime sleepiness 07/18/2019     Priority: Medium    Unspecified glaucoma      Priority: Medium     Created by Conversion        Healthcare maintenance 06/18/2019     Priority: Medium    Benign prostatic hyperplasia with lower urinary tract symptoms, symptom details unspecified 06/18/2019     Priority: Medium    Urinary frequency 08/15/2018     Priority: Medium    Headache 03/07/2018     Priority: Medium    Cough 03/07/2018     Priority: Medium    Proteinuria 01/15/2018     Priority: Medium     300+  mg on 24-hour urine collection August 2019.  Serology for secondary causes negative  Due to previously uncontrolled hypertension        Osteoarthritis Of The Knee      Priority: Medium     Created by Conversion  Replacement Utility updated for latest IMO load    Replacing diagnoses that were inactivated after the 10/1/2021 regulatory import.      Cerebral Palsy      Priority: Medium     Created by Conversion  Replacement Utility updated for latest IMO load        Esophageal reflux      Priority: Medium     Created by Conversion        Aortic Sclerosis      Priority: Medium     Created by Conversion  Replacement Utility updated for latest IMO load        Diverticulosis      Priority: Medium     Created by Conversion  Replacement Utility updated for latest IMO load        Acute bilateral low back pain without sciatica 05/12/2015     Priority: Medium    Neuritis      Priority: Medium     Created by Conversion        Benign Adenomatous Polyp Of The Large Intestine      Priority:  Medium     Created by Conversion        Personal history of colon polyps, unspecified 11/30/2012     Priority: Medium     Formatting of this note might be different from the original.  Previous polyps; 11-30-12 two rectal polyps        Past Medical History:   Diagnosis Date    Aortic sclerosis     Back pain     Benign neoplasm of adenomatous polyp     of the large intestine    Carpal tunnel syndrome     Cerebral palsy (H)     Diverticulosis     Esophageal reflux     Glaucoma     Hyperlipidemia     Hypertension     Left ventricular hypertrophy     Leg weakness     left    Neuritis     Osteoarthritis of knee      Past Surgical History:   Procedure Laterality Date    IMPLANT VALVE EYE Left 12/18/2023    Procedure: INSERTION, GLAUCOMA SHUNT IMPLANT, EYE - Left;  Surgeon: Flori Hamilton MD;  [facility]: [facility]    PHACOEMULSIFICATION WITH STANDARD INTRAOCULAR LENS IMPLANT Left 12/18/2023    Procedure: LEFT - Phacoemulsificaiton  CATARACT,  WITH INTRAOCULAR LENS IMPLANT INSERTION, CTR, iridectomy;  Surgeon: Dre Layton MD;  Location: [facility] OR    REPOSITION INTRAOCULAR LENS Left 4/29/2024    Procedure: LEFT INTRAOCULAR REPOSITIONING, ANTERIOR CHAMBER REFORMATION;  Surgeon: Dre Layton MD;  [facility]    VITRECTOMY ANTERIOR Left 4/29/2024    Procedure: LEFT ANTERIOR VITRECTOMY;  Surgeon: Dre Layton MD;  [facility]: [facility]    ZZC EXPLORATORY OF ABDOMEN      Description: Exploratory Laparotomy;  Recorded: 09/25/2008;  Comments: small bowel obstruction     Current Outpatient Medications   Medication Sig Dispense Refill    atorvastatin (LIPITOR) 20 MG tablet Take 1 tablet (20 mg) by mouth at bedtime 90 tablet 3    brimonidine (ALPHAGAN) 0.2 % ophthalmic solution Place 1 drop Into the left eye 2 times daily. 10 mL 5    cloNIDine (CATAPRES) 0.1 MG tablet Take 2 tablets (0.2 mg) by mouth 2 times daily 360 tablet 2    dorzolamide-timolol (COSOPT) 2-0.5 % ophthalmic solution Place 1 drop  Into the left eye 2 times daily. 10 mL 5    finasteride (PROSCAR) 5 MG tablet TAKE ONE TABLET BY MOUTH ONCE DAILY 90 tablet 3    losartan (COZAAR) 100 MG tablet TAKE 1 TABLET(100 MG) BY MOUTH DAILY 90 tablet 3    metoprolol succinate ER (TOPROL XL) 100 MG 24 hr tablet Take 1 tablet (100 mg) by mouth daily. 90 tablet 3    omeprazole (PRILOSEC) 20 MG DR capsule Take 1 capsule (20 mg) by mouth daily 90 capsule 2    oxyBUTYnin (DITROPAN) 5 MG tablet Take 1 tablet (5 mg) by mouth at bedtime 90 tablet 0    potassium chloride ER (K-TAB) 20 MEQ CR tablet Take 1 tablet (20 mEq) by mouth daily. 90 tablet 3    prednisoLONE acetate (PRED FORTE) 1 % ophthalmic suspension Place 1-2 drops Into the left eye 4 times daily 10 mL 4    spironolactone (ALDACTONE) 25 MG tablet Take 1 tablet (25 mg) by mouth daily 90 tablet 3    tamsulosin (FLOMAX) 0.4 MG capsule TAKE 1 CAPSULE BY MOUTH DAILY AFTER BREAKFAST. 90 capsule 3    furosemide (LASIX) 20 MG tablet Take 1 tablet (20 mg) by mouth daily 90 tablet 2    metoprolol succinate ER (TOPROL XL) 200 MG 24 hr tablet TAKE 1 TABLET(200 MG) BY MOUTH DAILY (Patient not taking: Reported on 9/27/2024) 90 tablet 3    prednisoLONE acetate (PRED FORTE) 1 % ophthalmic suspension Place 1-2 drops Into the left eye 3 times daily         Allergies   Allergen Reactions    Morphine Nausea and Vomiting        Social History     Tobacco Use    Smoking status: Never     Passive exposure: Never    Smokeless tobacco: Never   Substance Use Topics    Alcohol use: No       History   Drug Use No         Full 10 system review including constitutional, respiratory, cardiac, gi, urinary, rheumatologic, neurologic, reproductive, dermatologic psychiatric is  performed  and is otherwise negative       Objective    /73 (BP Location: Left arm, Patient Position: Sitting, Cuff Size: Adult Regular)   Pulse (!) 46   Temp 97.8  F (36.6  C) (Temporal)   Resp 14   Wt 64.9 kg (143 lb)   SpO2 97%   BMI 23.80 kg/m    "  Estimated body mass index is 23.8 kg/m  as calculated from the following:    Height as of 4/29/24: 1.651 m (5' 5\").    Weight as of this encounter: 64.9 kg (143 lb).  Physical Exam  Gen- alert, oriented/ appropriately responsive  Hard of hearing  HEENT- normal cephalic, atraumatic.   Left eye corneal opacity  Oral cavity grossly normal  Chest- Normal inspiration and expiration.    Clear to ascultation.    No chest wall deformity or scar.  CV- Heart regular rate and rhythm  normal tones, no murmurs   Abdomen-soft, and nontender, no organomegaly or masses.  No gallops or rubs.  Ext- appear well perfused, trace lower extremity edema  Skin- warm and dry, no concerning lesions/rash noted  Neuro exam grossly nonfocal-cranial nerves intact, normal gait, movements.  no visualized rash      Recent Labs   Lab Test 09/27/24  1128 09/18/24  1620 04/29/24  1005 04/23/24  1016   HGB 16.8 14.9  --  17.4     --   --   --     138  --  138   POTASSIUM 4.4 4.9   < > 4.2   CR 1.79* 1.95*  --  1.12   A1C  --   --   --  5.9*    < > = values in this interval not displayed.        Diagnostics  Results for orders placed or performed in visit on 11/07/24   EKG 12-lead, tracing only     Status: None (Preliminary result)   Result Value Ref Range    Systolic Blood Pressure  mmHg    Diastolic Blood Pressure  mmHg    Ventricular Rate 46 BPM    Atrial Rate 46 BPM    CT Interval 142 ms    QRS Duration 82 ms     ms    QTc 442 ms    P Axis 68 degrees    R AXIS 43 degrees    T Axis 151 degrees    Interpretation ECG       Sinus bradycardia  Possible Left atrial enlargement  ST elevation consider inferior injury or acute infarct  ** ** ACUTE MI / STEMI ** **  Consider right ventricular involvement in acute inferior infarct  Abnormal ECG  When compared with ECG of 18-Dec-2023 11:23,  T wave inversion now evident in Anterior leads       Agree with above finding.  ST changes were present on most recent EKG, December " 2023.    Intermediate risk surgery  Well compensated HFpEF  Unknown functional capacity  No known coronary artery disease    Abnormal ST segments on EKG are old.  Patient with pronounced bradycardia-this is chronic but worsened.  Asymptomatic, no presyncope, and on high-dose beta-blocker.  Will reduce metoprolol from 200 mg daily to 100 mg      Labs are currently pending and will be available on [facility] [employer]    Acceptable risk for surgery  No special recommendations:  Clonidine, metoprolol, losartan as usual with sip of water on morning of surgery.      Signed Electronically by: Ladarius Soria MD  A copy of this evaluation report is provided to the requesting physician.    Intermediate

## 2024-11-08 LAB
ANION GAP SERPL CALCULATED.3IONS-SCNC: 11 MMOL/L (ref 7–15)
ATRIAL RATE - MUSE: 46 BPM
BUN SERPL-MCNC: 31.5 MG/DL (ref 8–23)
CALCIUM SERPL-MCNC: 9.1 MG/DL (ref 8.8–10.4)
CHLORIDE SERPL-SCNC: 102 MMOL/L (ref 98–107)
CHOLEST SERPL-MCNC: 114 MG/DL
CREAT SERPL-MCNC: 1.95 MG/DL (ref 0.67–1.17)
DIASTOLIC BLOOD PRESSURE - MUSE: NORMAL MMHG
EGFRCR SERPLBLD CKD-EPI 2021: 37 ML/MIN/1.73M2
FASTING STATUS PATIENT QL REPORTED: NO
FASTING STATUS PATIENT QL REPORTED: NO
GLUCOSE SERPL-MCNC: 103 MG/DL (ref 70–99)
HCO3 SERPL-SCNC: 24 MMOL/L (ref 22–29)
HDLC SERPL-MCNC: 35 MG/DL
INTERPRETATION ECG - MUSE: NORMAL
LDLC SERPL CALC-MCNC: 61 MG/DL
NONHDLC SERPL-MCNC: 79 MG/DL
P AXIS - MUSE: 68 DEGREES
POTASSIUM SERPL-SCNC: 3.8 MMOL/L (ref 3.4–5.3)
PR INTERVAL - MUSE: 142 MS
QRS DURATION - MUSE: 82 MS
QT - MUSE: 506 MS
QTC - MUSE: 442 MS
R AXIS - MUSE: 43 DEGREES
SODIUM SERPL-SCNC: 137 MMOL/L (ref 135–145)
SYSTOLIC BLOOD PRESSURE - MUSE: NORMAL MMHG
T AXIS - MUSE: 151 DEGREES
TRIGL SERPL-MCNC: 90 MG/DL
VENTRICULAR RATE- MUSE: 46 BPM

## 2024-11-10 ENCOUNTER — ANESTHESIA EVENT (OUTPATIENT)
Dept: SURGERY | Facility: CLINIC | Age: 64
End: 2024-11-10
Payer: MEDICARE

## 2024-11-10 NOTE — ANESTHESIA PREPROCEDURE EVALUATION
Anesthesia Pre-Procedure Evaluation    Patient: Bright Lockett   MRN: [id number] : 1960        Procedure : Procedure(s):  KERATOPLASTY, PENETRATING  VITRECTOMY, PARS PLANA APPROACH, USING 25-GAUGE INSTRUMENTS (Left)  60min  REVISION, TUBE OR SHUNT INSERTION, FOR GLAUCOMA (Left)          Past Medical History:   Diagnosis Date    Aortic sclerosis     Back pain     Benign neoplasm of adenomatous polyp     of the large intestine    Carpal tunnel syndrome     Cerebral palsy (H)     Diverticulosis     Esophageal reflux     Glaucoma     Hyperlipidemia     Hypertension     Left ventricular hypertrophy     Leg weakness     left    Neuritis     Osteoarthritis of knee       Past Surgical History:   Procedure Laterality Date    IMPLANT VALVE EYE Left 2023    Procedure: INSERTION, GLAUCOMA SHUNT IMPLANT, EYE - Left;  Surgeon: Flori Hamilton MD;  [facility]    PHACOEMULSIFICATION WITH STANDARD INTRAOCULAR LENS IMPLANT Left 2023    Procedure: LEFT - Phacoemulsificaiton  CATARACT,  WITH INTRAOCULAR LENS IMPLANT INSERTION, CTR, iridectomy;  Surgeon: Dre Layton MD;  [facility]    REPOSITION INTRAOCULAR LENS Left 2024    Procedure: LEFT INTRAOCULAR REPOSITIONING, ANTERIOR CHAMBER REFORMATION;  Surgeon: Dre Layton MD;  [facility]    VITRECTOMY ANTERIOR Left 2024    Procedure: LEFT ANTERIOR VITRECTOMY;  Surgeon: Dre Layton MD;  [facility]    ZZC EXPLORATORY OF ABDOMEN      Description: Exploratory Laparotomy;  Recorded: 2008;  Comments: small bowel obstruction      Allergies   Allergen Reactions    Morphine Nausea and Vomiting      Social History     Tobacco Use    Smoking status: Never     Passive exposure: Never    Smokeless tobacco: Never   Substance Use Topics    Alcohol use: No      Wt Readings from Last 1 Encounters:   24 64.9 kg (143 lb)        Anesthesia Evaluation   Pt has had prior anesthetic. Type: General.   "      ROS/MED HX  ENT/Pulmonary:       Neurologic:       Cardiovascular: Comment: Narrative & Impression  1. Normal left ventricular size and systolic performance with a visually estimated ejection fraction of 60%.   2. There is mild to moderate concentric increase in left ventricular wall thickness.   3. There is mild aortic insufficiency.   4. Normal right ventricular size and systolic performance.   5. There is mild left atrial enlargement.        Component  Ref Range & Units 3 yr ago  BSA  m2 1.88          (+)  hypertension- -   -  - -                                      METS/Exercise Tolerance:     Hematologic:       Musculoskeletal:       GI/Hepatic:     (+) GERD,                   Renal/Genitourinary:     (+) renal disease,             Endo:       Psychiatric/Substance Use:     (+) psychiatric history        Infectious Disease:       Malignancy:       Other:            Physical Exam    Airway        Mallampati: II   TM distance: > 3 FB      Respiratory Devices and Support         Dental       (+) Modest Abnormalities - crowns, retainers, 1 or 2 missing teeth      Cardiovascular   cardiovascular exam normal          Pulmonary   pulmonary exam normal                OUTSIDE LABS:  CBC:   Lab Results   Component Value Date    WBC 5.1 09/27/2024    WBC 6.1 10/10/2023    HGB 15.3 11/07/2024    HGB 16.8 09/27/2024    HCT 50.1 09/27/2024    HCT 50.0 10/10/2023     09/27/2024     10/10/2023     BMP:   Lab Results   Component Value Date     11/07/2024     09/27/2024    POTASSIUM 3.8 11/07/2024    POTASSIUM 4.4 09/27/2024    CHLORIDE 102 11/07/2024    CHLORIDE 101 09/27/2024    CO2 24 11/07/2024    CO2 26 09/27/2024    BUN 31.5 (H) 11/07/2024    BUN 28.7 (H) 09/27/2024    CR 1.95 (H) 11/07/2024    CR 1.79 (H) 09/27/2024     (H) 11/07/2024    GLC 91 09/27/2024     COAGS: No results found for: \"PTT\", \"INR\", \"FIBR\"  POC: No results found for: \"BGM\", \"HCG\", \"HCGS\"  HEPATIC:   Lab Results "   Component Value Date    ALBUMIN 4.3 04/23/2024    PROTTOTAL 7.7 04/23/2024    ALT 15 04/23/2024    AST 19 04/23/2024    ALKPHOS 77 04/23/2024    BILITOTAL 1.1 04/23/2024     OTHER:   Lab Results   Component Value Date    A1C 5.9 (H) 04/23/2024    RAMY 9.1 11/07/2024    MAG 2.1 09/18/2024    TSH 2.39 10/10/2023    CRP 2.3 (H) 07/04/2020    SED 5 07/04/2020       Anesthesia Plan    ASA Status:  3    NPO Status:  NPO Appropriate    Anesthesia Type: General.     - Airway: ETT              Consents          - Extended Intubation/Ventilatory Support Discussed: No.      - Patient is DNR/DNI Status: No          Postoperative Care            Comments:    Other Comments: GA with std monitors           Keya Black MD    I have reviewed the pertinent notes and labs in the chart from the past 30 days and (re)examined the patient.  Any updates or changes from those notes are reflected in this note.               # Hypertension: Noted on problem list

## 2024-11-11 ENCOUNTER — HOSPITAL ENCOUNTER (EMERGENCY)
Facility: CLINIC | Age: 64
Discharge: HOME | End: 2024-11-12
Attending: EMERGENCY MEDICINE | Admitting: EMERGENCY MEDICINE
Payer: MEDICARE

## 2024-11-11 ENCOUNTER — ANESTHESIA (OUTPATIENT)
Dept: SURGERY | Facility: CLINIC | Age: 64
End: 2024-11-11
Payer: MEDICARE

## 2024-11-11 ENCOUNTER — HOSPITAL ENCOUNTER (OUTPATIENT)
Facility: CLINIC | Age: 64
Discharge: HOME | End: 2024-11-11
Attending: OPHTHALMOLOGY | Admitting: OPHTHALMOLOGY
Payer: MEDICARE

## 2024-11-11 VITALS
BODY MASS INDEX: 25.09 KG/M2 | RESPIRATION RATE: 18 BRPM | DIASTOLIC BLOOD PRESSURE: 92 MMHG | OXYGEN SATURATION: 95 % | TEMPERATURE: 97.9 F | HEIGHT: 65 IN | WEIGHT: 150.57 LBS | SYSTOLIC BLOOD PRESSURE: 152 MMHG | HEART RATE: 81 BPM

## 2024-11-11 DIAGNOSIS — R34 DECREASED URINATION: ICD-10-CM

## 2024-11-11 DIAGNOSIS — Z98.890 POSTOPERATIVE EYE STATE: Primary | ICD-10-CM

## 2024-11-11 PROCEDURE — C1762 CONN TISS, HUMAN(INC FASCIA): HCPCS | Performed by: OPHTHALMOLOGY

## 2024-11-11 PROCEDURE — 710N000010 HC RECOVERY PHASE 1, LEVEL 2, PER MIN: Performed by: OPHTHALMOLOGY

## 2024-11-11 PROCEDURE — 250N000009 HC RX 250: Performed by: OPHTHALMOLOGY

## 2024-11-11 PROCEDURE — 87070 CULTURE OTHR SPECIMN AEROBIC: CPT | Performed by: OPHTHALMOLOGY

## 2024-11-11 PROCEDURE — 65770 KERATOPROSTHESIS: CPT | Performed by: ANESTHESIOLOGY

## 2024-11-11 PROCEDURE — 272N000001 HC OR GENERAL SUPPLY STERILE: Performed by: OPHTHALMOLOGY

## 2024-11-11 PROCEDURE — 67121 REMOVE EYE IMPLANT MATERIAL: CPT | Mod: LT | Performed by: OPHTHALMOLOGY

## 2024-11-11 PROCEDURE — 370N000017 HC ANESTHESIA TECHNICAL FEE, PER MIN: Performed by: OPHTHALMOLOGY

## 2024-11-11 PROCEDURE — 67039 LASER TREATMENT OF RETINA: CPT | Mod: LT | Performed by: OPHTHALMOLOGY

## 2024-11-11 PROCEDURE — 258N000003 HC RX IP 258 OP 636: Performed by: ANESTHESIOLOGY

## 2024-11-11 PROCEDURE — 87102 FUNGUS ISOLATION CULTURE: CPT | Performed by: OPHTHALMOLOGY

## 2024-11-11 PROCEDURE — 710N000012 HC RECOVERY PHASE 2, PER MINUTE: Performed by: OPHTHALMOLOGY

## 2024-11-11 PROCEDURE — 250N000011 HC RX IP 250 OP 636: Performed by: ANESTHESIOLOGY

## 2024-11-11 PROCEDURE — 250N000025 HC SEVOFLURANE, PER MIN: Performed by: OPHTHALMOLOGY

## 2024-11-11 PROCEDURE — 87101 SKIN FUNGI CULTURE: CPT | Performed by: OPHTHALMOLOGY

## 2024-11-11 PROCEDURE — 76775 US EXAM ABDO BACK WALL LIM: CPT | Mod: 26 | Performed by: EMERGENCY MEDICINE

## 2024-11-11 PROCEDURE — 66185 REVISE AQUEOUS SHUNT EYE: CPT | Mod: LT | Performed by: OPHTHALMOLOGY

## 2024-11-11 PROCEDURE — 250N000011 HC RX IP 250 OP 636: Performed by: OPHTHALMOLOGY

## 2024-11-11 PROCEDURE — 99284 EMERGENCY DEPT VISIT MOD MDM: CPT | Mod: 25 | Performed by: EMERGENCY MEDICINE

## 2024-11-11 PROCEDURE — 65770 KERATOPROSTHESIS: CPT | Performed by: REGISTERED NURSE

## 2024-11-11 PROCEDURE — 65770 KERATOPROSTHESIS: CPT | Mod: LT | Performed by: OPHTHALMOLOGY

## 2024-11-11 PROCEDURE — 250N000009 HC RX 250: Performed by: ANESTHESIOLOGY

## 2024-11-11 PROCEDURE — 999N000141 HC STATISTIC PRE-PROCEDURE NURSING ASSESSMENT: Performed by: OPHTHALMOLOGY

## 2024-11-11 PROCEDURE — 65730 CORNEAL TRANSPLANT: CPT | Mod: LT | Performed by: OPHTHALMOLOGY

## 2024-11-11 PROCEDURE — 360N000077 HC SURGERY LEVEL 4, PER MIN: Performed by: OPHTHALMOLOGY

## 2024-11-11 PROCEDURE — V2785 CORNEAL TISSUE PROCESSING: HCPCS | Performed by: OPHTHALMOLOGY

## 2024-11-11 PROCEDURE — 76775 US EXAM ABDO BACK WALL LIM: CPT | Performed by: EMERGENCY MEDICINE

## 2024-11-11 PROCEDURE — 250N000009 HC RX 250: Performed by: STUDENT IN AN ORGANIZED HEALTH CARE EDUCATION/TRAINING PROGRAM

## 2024-11-11 DEVICE — EYE CORNEA PROCESS FEE FOR MN LIONS BANK: Type: IMPLANTABLE DEVICE | Site: EYE | Status: FUNCTIONAL

## 2024-11-11 DEVICE — EYE IMP OPTIGRAFT CORNEA 1/2 HALO HCO-HH1: Type: IMPLANTABLE DEVICE | Site: EYE | Status: FUNCTIONAL

## 2024-11-11 RX ORDER — LIDOCAINE HYDROCHLORIDE 20 MG/ML
INJECTION, SOLUTION INFILTRATION; PERINEURAL PRN
Status: DISCONTINUED | OUTPATIENT
Start: 2024-11-11 | End: 2024-11-11

## 2024-11-11 RX ORDER — PREDNISOLONE ACETATE 10 MG/ML
1 SUSPENSION/ DROPS OPHTHALMIC 4 TIMES DAILY
Qty: 5 ML | Refills: 1 | Status: SHIPPED | OUTPATIENT
Start: 2024-11-11

## 2024-11-11 RX ORDER — GLYCOPYRROLATE 0.2 MG/ML
INJECTION, SOLUTION INTRAMUSCULAR; INTRAVENOUS PRN
Status: DISCONTINUED | OUTPATIENT
Start: 2024-11-11 | End: 2024-11-11

## 2024-11-11 RX ORDER — SODIUM CHLORIDE, SODIUM LACTATE, POTASSIUM CHLORIDE, CALCIUM CHLORIDE 600; 310; 30; 20 MG/100ML; MG/100ML; MG/100ML; MG/100ML
INJECTION, SOLUTION INTRAVENOUS CONTINUOUS PRN
Status: DISCONTINUED | OUTPATIENT
Start: 2024-11-11 | End: 2024-11-11

## 2024-11-11 RX ORDER — ONDANSETRON 4 MG/1
4 TABLET, ORALLY DISINTEGRATING ORAL EVERY 30 MIN PRN
Status: DISCONTINUED | OUTPATIENT
Start: 2024-11-11 | End: 2024-11-11 | Stop reason: HOSPADM

## 2024-11-11 RX ORDER — DEXAMETHASONE SODIUM PHOSPHATE 4 MG/ML
4 INJECTION, SOLUTION INTRA-ARTICULAR; INTRALESIONAL; INTRAMUSCULAR; INTRAVENOUS; SOFT TISSUE
Status: DISCONTINUED | OUTPATIENT
Start: 2024-11-11 | End: 2024-11-11 | Stop reason: HOSPADM

## 2024-11-11 RX ORDER — PROPARACAINE HYDROCHLORIDE 5 MG/ML
1 SOLUTION/ DROPS OPHTHALMIC ONCE
Status: COMPLETED | OUTPATIENT
Start: 2024-11-11 | End: 2024-11-11

## 2024-11-11 RX ORDER — FENTANYL CITRATE 50 UG/ML
INJECTION, SOLUTION INTRAMUSCULAR; INTRAVENOUS PRN
Status: DISCONTINUED | OUTPATIENT
Start: 2024-11-11 | End: 2024-11-11

## 2024-11-11 RX ORDER — FENTANYL CITRATE 50 UG/ML
50 INJECTION, SOLUTION INTRAMUSCULAR; INTRAVENOUS EVERY 5 MIN PRN
Status: DISCONTINUED | OUTPATIENT
Start: 2024-11-11 | End: 2024-11-11 | Stop reason: HOSPADM

## 2024-11-11 RX ORDER — FENTANYL CITRATE 50 UG/ML
25 INJECTION, SOLUTION INTRAMUSCULAR; INTRAVENOUS EVERY 5 MIN PRN
Status: DISCONTINUED | OUTPATIENT
Start: 2024-11-11 | End: 2024-11-11 | Stop reason: HOSPADM

## 2024-11-11 RX ORDER — ONDANSETRON 2 MG/ML
4 INJECTION INTRAMUSCULAR; INTRAVENOUS EVERY 30 MIN PRN
Status: DISCONTINUED | OUTPATIENT
Start: 2024-11-11 | End: 2024-11-11 | Stop reason: HOSPADM

## 2024-11-11 RX ORDER — HYDROMORPHONE HYDROCHLORIDE 1 MG/ML
0.2 INJECTION, SOLUTION INTRAMUSCULAR; INTRAVENOUS; SUBCUTANEOUS EVERY 5 MIN PRN
Status: DISCONTINUED | OUTPATIENT
Start: 2024-11-11 | End: 2024-11-11 | Stop reason: HOSPADM

## 2024-11-11 RX ORDER — CYCLOPENTOLAT/TROPIC/PHENYLEPH 1%-1%-2.5%
1 DROPS (EA) OPHTHALMIC (EYE)
Status: COMPLETED | OUTPATIENT
Start: 2024-11-11 | End: 2024-11-11

## 2024-11-11 RX ORDER — DEXAMETHASONE SODIUM PHOSPHATE 4 MG/ML
INJECTION, SOLUTION INTRA-ARTICULAR; INTRALESIONAL; INTRAMUSCULAR; INTRAVENOUS; SOFT TISSUE PRN
Status: DISCONTINUED | OUTPATIENT
Start: 2024-11-11 | End: 2024-11-11

## 2024-11-11 RX ORDER — NALOXONE HYDROCHLORIDE 0.4 MG/ML
0.1 INJECTION, SOLUTION INTRAMUSCULAR; INTRAVENOUS; SUBCUTANEOUS
Status: DISCONTINUED | OUTPATIENT
Start: 2024-11-11 | End: 2024-11-11 | Stop reason: HOSPADM

## 2024-11-11 RX ORDER — PROPOFOL 10 MG/ML
INJECTION, EMULSION INTRAVENOUS PRN
Status: DISCONTINUED | OUTPATIENT
Start: 2024-11-11 | End: 2024-11-11

## 2024-11-11 RX ORDER — ONDANSETRON 2 MG/ML
INJECTION INTRAMUSCULAR; INTRAVENOUS PRN
Status: DISCONTINUED | OUTPATIENT
Start: 2024-11-11 | End: 2024-11-11

## 2024-11-11 RX ORDER — BALANCED SALT SOLUTION 6.4; .75; .48; .3; 3.9; 1.7 MG/ML; MG/ML; MG/ML; MG/ML; MG/ML; MG/ML
SOLUTION OPHTHALMIC PRN
Status: DISCONTINUED | OUTPATIENT
Start: 2024-11-11 | End: 2024-11-11 | Stop reason: HOSPADM

## 2024-11-11 RX ORDER — HYDROMORPHONE HYDROCHLORIDE 1 MG/ML
0.4 INJECTION, SOLUTION INTRAMUSCULAR; INTRAVENOUS; SUBCUTANEOUS EVERY 5 MIN PRN
Status: DISCONTINUED | OUTPATIENT
Start: 2024-11-11 | End: 2024-11-11 | Stop reason: HOSPADM

## 2024-11-11 RX ORDER — OFLOXACIN 3 MG/ML
1 SOLUTION/ DROPS OPHTHALMIC 4 TIMES DAILY
Qty: 5 ML | Refills: 0 | Status: SHIPPED | OUTPATIENT
Start: 2024-11-11 | End: 2024-12-09

## 2024-11-11 RX ORDER — SODIUM CHLORIDE, SODIUM LACTATE, POTASSIUM CHLORIDE, CALCIUM CHLORIDE 600; 310; 30; 20 MG/100ML; MG/100ML; MG/100ML; MG/100ML
INJECTION, SOLUTION INTRAVENOUS CONTINUOUS
Status: DISCONTINUED | OUTPATIENT
Start: 2024-11-11 | End: 2024-11-11 | Stop reason: HOSPADM

## 2024-11-11 RX ADMIN — FENTANYL CITRATE 100 MCG: 50 INJECTION INTRAMUSCULAR; INTRAVENOUS at 07:54

## 2024-11-11 RX ADMIN — GLYCOPYRROLATE 0.2 MG: 0.2 INJECTION, SOLUTION INTRAMUSCULAR; INTRAVENOUS at 08:19

## 2024-11-11 RX ADMIN — HYDROMORPHONE HYDROCHLORIDE 0.25 MG: 1 INJECTION, SOLUTION INTRAMUSCULAR; INTRAVENOUS; SUBCUTANEOUS at 09:07

## 2024-11-11 RX ADMIN — SODIUM CHLORIDE, POTASSIUM CHLORIDE, SODIUM LACTATE AND CALCIUM CHLORIDE: 600; 310; 30; 20 INJECTION, SOLUTION INTRAVENOUS at 07:42

## 2024-11-11 RX ADMIN — Medication 1 DROP: at 06:46

## 2024-11-11 RX ADMIN — LIDOCAINE HYDROCHLORIDE 100 MG: 20 INJECTION, SOLUTION INFILTRATION; PERINEURAL at 07:54

## 2024-11-11 RX ADMIN — Medication 50 MG: at 07:57

## 2024-11-11 RX ADMIN — PHENYLEPHRINE HYDROCHLORIDE 100 MCG: 10 INJECTION INTRAVENOUS at 08:05

## 2024-11-11 RX ADMIN — ONDANSETRON 4 MG: 2 INJECTION INTRAMUSCULAR; INTRAVENOUS at 11:13

## 2024-11-11 RX ADMIN — DEXAMETHASONE SODIUM PHOSPHATE 6 MG: 4 INJECTION, SOLUTION INTRAMUSCULAR; INTRAVENOUS at 08:03

## 2024-11-11 RX ADMIN — Medication 1 DROP: at 06:57

## 2024-11-11 RX ADMIN — PROPARACAINE HYDROCHLORIDE 1 DROP: 5 SOLUTION/ DROPS OPHTHALMIC at 06:44

## 2024-11-11 RX ADMIN — PROPOFOL 120 MG: 10 INJECTION, EMULSION INTRAVENOUS at 07:54

## 2024-11-11 RX ADMIN — HYDROMORPHONE HYDROCHLORIDE 0.25 MG: 1 INJECTION, SOLUTION INTRAMUSCULAR; INTRAVENOUS; SUBCUTANEOUS at 09:19

## 2024-11-11 RX ADMIN — Medication 10 MG: at 09:16

## 2024-11-11 RX ADMIN — Medication 1 DROP: at 06:52

## 2024-11-11 RX ADMIN — SUGAMMADEX 150 MG: 100 INJECTION, SOLUTION INTRAVENOUS at 11:27

## 2024-11-11 RX ADMIN — Medication 10 MG: at 09:03

## 2024-11-11 ASSESSMENT — ACTIVITIES OF DAILY LIVING (ADL)
ADLS_ACUITY_SCORE: 0

## 2024-11-11 ASSESSMENT — COLUMBIA-SUICIDE SEVERITY RATING SCALE - C-SSRS
2. HAVE YOU ACTUALLY HAD ANY THOUGHTS OF KILLING YOURSELF IN THE PAST MONTH?: NO
1. IN THE PAST MONTH, HAVE YOU WISHED YOU WERE DEAD OR WISHED YOU COULD GO TO SLEEP AND NOT WAKE UP?: NO
6. HAVE YOU EVER DONE ANYTHING, STARTED TO DO ANYTHING, OR PREPARED TO DO ANYTHING TO END YOUR LIFE?: NO

## 2024-11-11 NOTE — OP NOTE
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 Item Serial No.  Lot No. LRB No. Used Action   EYE IMP OPTIGRAFT CORNEA 1/2 HALO HCO-HH1 - [id number] Bone/Tissue/Biologic EYE IMP OPTIGRAFT CORNEA 1/2 HALO HCO-HH1 [id number] TISSUE PALMER INTERNA  Left 1 Implanted   EYE IMP GRAFT CORNEA 1/2 HALO 1/2 THICK HCO-HH1 - [id number] Bone/Tissue/Biologic EYE IMP OPTIGRAFT CORNEA 1/2 HALO HCO-HH1 [id number]   Left 1 Explanted   LENS [id number] 25.5 CLAREON UV ASPHERIC BICONVEX IOL - [id number] Lens/Eye Implant LENS [id number] 25.5 CLAREON UV ASPHERIC BICONVEX IOL [id number] [facility]  Left 1 Explanted     FINDINGS: IOL was adhered to endothelium; temporal area of concern for schisis cavity (surrounded by laser); infratemporal arcade with small subretinal hemorrhage (surrounded by laser); superior chronic HRVO (treated with PRP today); raised hyaloid with kenalog; left aphakic     INDICATIONS: Bright Lockett is a 64 year old monocular male presenting for the procedures listed above. They underwent JOYCE/CEIOL OS with intracapsular implant 12/2024 followed by corneal and tube failure so the decision was made to perform the procedure listed above.    DESCRIPTION OF PROCEDURE:    The patient was were dilated in the preoperative area and taken to the operating room where general anesthesia was administered. The patient was prepped and draped in the typical ophthalmic  fashion including instillation of povidone iodine. Dr. Hamilton first removed the tube from the anterior chamber and sutured its tract. Dr. Archibald then placed a TKP and explanted the IOL.     Attention was then turned to the vitrectomy. Marks were made on the sclera inferotemporally, superotemporally, and superonasally 3.5 mm posterior to the limbus. The 25G transscleral cannulas were inserted through the sclera using the trocars. The infusion cannula was connected inferotemporally and directly visualized to verify it was in the correct location. The vitrector handpiece and endoilluiminator were placed in the eye. Upon entering the eye it was noted that the patient has intraretinal hemorrhages diffusely and an infratemporal subretinal hemorrhage 1DD in size likely due to pressure fluctuation intraoperatively. A schisis cavity was also noted temporally.   A Pars plana vitrectomy (PPV) was performed and the vitreous was stained with kenalog. PVD was induced. Peripheral vitrectomy was assisted with scleral depression. There were no retinal breaks. Laser was placed around the subretinal hemorrhage and in the superior 180 degrees of periphery. The cannulas were removed and sclerotomies sutured with 6-0 plain gut and were watertight. The pressure was checked and verified to be appropriate.   Dr. Hamilton and Dr. Archibald then performed tube repositioning and PKP and completed the surgery as detailed in their operative notes.    The surgery was assisted by Alexsander Dunham MD who assisted with the vitrectomy. Due to the delicate and complex nature of this surgery their assistance was required. Dr. Dunham performed pars plana vitrectomy and bradley BAY was present for the entire surgery.

## 2024-11-11 NOTE — ANESTHESIA PROCEDURE NOTES
Airway       Patient location during procedure: OR       Procedure Start/Stop Times: 11/11/2024 8:01 AM  Staff -        CRNA: Kristina Richardson APRN CRNA       Performed By: CRNA  Consent for Airway        Urgency: elective  Indications and Patient Condition       Indications for airway management: reilly-procedural       Induction type:intravenous       Mask difficulty assessment: 2 - vent by mask + OA or adjuvant +/- NMBA    Final Airway Details       Final airway type: endotracheal airway       Successful airway: ETT - single and Oral  Endotracheal Airway Details        ETT size (mm): 7.5       Cuffed: yes       Cuff volume (mL): 8       Successful intubation technique: video laryngoscopy       VL Blade Size: Glidescope 4       Grade View of Cords: 1       Adjucts: stylet       Position: Right       Measured from: lips       Secured at (cm): 23       Bite block used: None    Post intubation assessment        Placement verified by: capnometry, equal breath sounds and chest rise        Number of attempts at approach: 1       Number of other approaches attempted: 0       Secured with: tape       Ease of procedure: easy       Dentition: Intact and Unchanged    Medication(s) Administered   Medication Administration Time: 11/11/2024 8:01 AM

## 2024-11-11 NOTE — DISCHARGE INSTRUCTIONS
Dr. Archibald Clinic Number [phone]    To contact a doctor, call  or:  '   [phone] and ask for the Resident On Call for          opthamology (answered 24 hours a day)  '   Emergency Department:  [facility]'s Emergency Department:  [phone]     POST-OPERATIVE INSTRUCTIONS FOLLOWING RETINA SURGERY    Department of Ophthalmology  [facility]  [phone]    ACTIVITY:  No heavy lifting for 2 weeks after surgery.  No swimming for 4 weeks after surgery.  It is OK for you to shower, to wash your face, and to wash your hair.  Allow the shower to hit the top of your head and wash down your face.  Do not hit your eye directly with the jet from the shower.  Keep your eye covered with the shield when you sleep for 1 week after surgery.  If your shield has a tab, it is designed to go over the bridge of your nose.  Place one piece of tape diagonally from the center of your forehead to the side of your cheek to secure the shield.   During the daytime, you can use either the shield or your regular eyeglasses or sunglasses to protect your eye.  No gas was placed in your eye.     EYE DROPS:  Use these drops after surgery.  When using more than one drop, separate them by 3 minutes between drops.  Common times to place drops are breakfast, lunch, dinner, and bedtime.  Do not stop your drops without discussing with our office.  If you run out before your appointment, call and we will send in a refill.    Prednisolone - 4 times per day (pink top)  2. Ofloxacin - 4 times per day (tan top)    WHAT TO EXPECT:  It is common for the eye to to have a blood tinged discharge for a few days after surgery.  It may feel irritated (as if something were in your eye), for there to be clear discharge (thicker in the mornings upon awakening), and for it to be bloodshot for 2-3 weeks following retina surgery.   You might feel itchiness, please do not rub your eye, instead it is OK to use  artificial tears to minimize symptoms.    WHAT TO WATCH OUT FOR:  If you experience any of the following, you should call immediately:  Increasing pain  Increasing nausea or vomiting  Increasing redness  Worsening or darkening of the vision  New flashing lights or floaters    For any of the symptoms listed above, or for other concerns, call [phone] and ask to speak to the clinic nurse.  If you call after hours, follow to prompts to reach the doctor on call.

## 2024-11-11 NOTE — ANESTHESIA POSTPROCEDURE EVALUATION
Patient: Bright Lockett    Procedure: Procedure(s):  KERATOProthesis, Temporary, Intracoular lens extraction  VITRECTOMY, PARS PLANA APPROACH, USING 25-GAUGE INSTRUMENTS (Left)  60min, ENDOLASER  REVISION, SHUNT INSERTION, FOR GLAUCOMA (Left)       Anesthesia Type:  General    Note:  Disposition: Outpatient   Postop Pain Control: Uneventful            Sign Out: Well controlled pain   PONV: No   Neuro/Psych: Uneventful            Sign Out: Acceptable/Baseline neuro status   Airway/Respiratory: Uneventful            Sign Out: Acceptable/Baseline resp. status   CV/Hemodynamics: Uneventful            Sign Out: Acceptable CV status; No obvious hypovolemia; No obvious fluid overload   Other NRE:    DID A NON-ROUTINE EVENT OCCUR?            Last vitals:  Vitals Value Taken Time   /72 11/11/24 1315   Temp 36.9  C (98.5  F) 11/11/24 1308   Pulse 78 11/11/24 1224   Resp 16 11/11/24 1315   SpO2 95 % 11/11/24 1315   Vitals shown include unfiled device data.    Electronically Signed By: Keya Black MD  November 11, 2024  2:05 PM

## 2024-11-11 NOTE — OP NOTE
Operative Report    Patient: Bright Lockett  [id number]  11/11/2024    SURGEON:  MD Dre Hernandez MD Wassef Chanbour, MD     ANESTHESIA: General   Date of Operation: 11/11/2024  Pre-operative diagnosis (Cornea):   1. Corneal scar and corneal decompensation, left eye  2. Dislocated intra ocular lens left eye  Post-operative diagnosis: Same   Procedure(s) (cornea):   1. Temporary keratoprosthesis, left eye  2. Removal of   intraocular lens  3. Penetrating keratoplasty, left eye  Surgeon(s): Dr quentin Archibald  Findings: None   Blood Loss: None  Complications: None   Implant Name Type Inv. Item Serial No.  Lot No. LRB No. Used Action   EYE CORNEA PROCESS FEE FOR [employer] - [id number] Lens/Eye Implant EYE CORNEA PROCESS FEE FOR [employer]  [employer] [employer]  Left 1 Implanted   EYE IMP OPTIGRAFT CORNEA 1/2 HALO HCO-HH1 - [id number] Bone/Tissue/Biologic EYE IMP OPTIGRAFT CORNEA 1/2 HALO HCO-HH1 [id number] [employer]  Left 1 Implanted   EYE IMP GRAFT CORNEA 1/2 HALO 1/2 THICK HCO-HH1 - [id number] Bone/Tissue/Biologic EYE IMP OPTIGRAFT CORNEA 1/2 HALO HCO-HH1 [id number]   Left 1 Explanted   LENS [id number] 25.5 CLAREON UV ASPHERIC BICONVEX IOL - [id number] Lens/Eye Implant LENS [id number] 25.5 CLAREON UV ASPHERIC BICONVEX IOL [id number] [employer]  Left 1 Explanted     COMPLICATIONS:  none  ESTIMATED BLOOD LOSS:   minimal  Anesthesia. General.   INDICATION FOR PROCEDURE   The patient has been followed in our eye clinic for corneal decompensation and corneal scar with dislocated IOL> the risks, including, but not limited to infection, loss of vision, loss of eye, need for more surgery, and bleeding, along with the benefits, alternatives, expectations, and the procedure itself were discussed at length with the patient who wished to proceed with surgery.   DESCRIPTION OF PROCEDURE   In the preoperative suite, the patient was identified, the surgical site marked  and informed consent was obtained. The patient was the brought back to the operative suite where the appropriate anesthesia monitors were connected. A routine time-out was performed .The patient's left eye was then prepped and draped in the usual sterile fashion for ophthalmic surgery.  Following draping, a lid speculum was placed to the operative eye. Calipers were used to measure the cornea.  See Dr. Mcmahon op note for this phase of the surgery.  A Flingeria ring was place in place with five 7.0 Vicryl sutures. An 7.25 mm Salguero-Hessberg corneal trephine was used to trephinate the patient's cornea to 80% depth. A pair of 0.12 forceps and a supersharp blade were used to enter the AC in a controlled fashion at 10 o'clock within the groove of the trephination. Healon was injected to fill the anterior chamber. Corneal scissors to the left and right were then used to completed excise the cornea along the trephination groove. Healon was then used to coat the open sandra and ocular surface. The scared cornea and the dislocated lens  were removed. A temporary keratoprosthesis (diameter 7.0mm) was put in placer and secured with six 10.0 nylon sutures.   See Dr. Hager and Dr. Mcmahon op notes for these stages of the surgery. (Vitrectomy and tube repositioning)  Attention was then directed towards the donor cornea. A 7.5mm donor suction trephine was then used to trephinate the cornea. The donor rim and media were sent for routine aerobic, anaerobic, and fungal cultures. The temporary KP was removed. The donor cornea was then placed endothelial side down over the open-sandra. The cornea was sutured in place using 16 interrupted 10-0 nylon sutures. BSS on a 30G cannula was used to irrigate the anterior chamber and burp out residual Healon. The sutures were rotated and buried. The wound was checked and found to be watertight. The AC was noted to be formed and the pressure was noted to be adequate.  Subconjunctival injections of  Ancef and Dexamethasone were administered to the inferior fornix. The lid speculum and drapes were carefully removed. Maxitrol drops were placed to the operative eye. A patch and shield were taped over the eye. The patient was then taken to the recovery room in stable condition having tolerated the procedure well. The patient was discharged home in good condition. Patient is to follow-up next day at eye clinic for post-operative visit.               SPECIMENS:    ID Type Source Tests Collected by Time Destination   A : Donor cornea Tissue Donor Cornea FUNGAL OR YEAST CULTURE ROUTINE, AEROBIC BACTERIAL CULTURE ROUTINE Kobi Archibald MD 11/11/2024  8:48 AM

## 2024-11-11 NOTE — ANESTHESIA CARE TRANSFER NOTE
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    Anesthesia Type:   General     Note:      Level of Consciousness: drowsy  Oxygen Supplementation: face mask  Level of Supplemental Oxygen (L/min / FiO2): 5  Independent Airway: airway patency satisfactory and stable  Dentition: dentition unchanged  Vital Signs Stable: post-procedure vital signs reviewed and stable  Report to RN Given: handoff report given  Patient transferred to: PACU    Handoff Report: Identifed the Patient, Identified the Reponsible Provider, Reviewed the pertinent medical history, Discussed the surgical course, Reviewed Intra-OP anesthesia mangement and issues during anesthesia, Set expectations for post-procedure period and Allowed opportunity for questions and acknowledgement of understanding      Vitals:  Vitals Value Taken Time   /63 11/11/24 1145   Temp     Pulse 74 11/11/24 1148   Resp 0 11/11/24 1148   SpO2 98 % 11/11/24 1148   Vitals shown include unfiled device data.    Electronically Signed By: ALIE Hunter CRNA  November 11, 2024  11:48 AM

## 2024-11-12 ENCOUNTER — OFFICE VISIT (OUTPATIENT)
Dept: OPHTHALMOLOGY | Facility: CLINIC | Age: 64
End: 2024-11-12
Attending: OPHTHALMOLOGY
Payer: MEDICARE

## 2024-11-12 ENCOUNTER — ANCILLARY PROCEDURE (OUTPATIENT)
Dept: ULTRASOUND IMAGING | Facility: CLINIC | Age: 64
End: 2024-11-12
Attending: EMERGENCY MEDICINE
Payer: MEDICARE

## 2024-11-12 VITALS
WEIGHT: 147 LBS | SYSTOLIC BLOOD PRESSURE: 145 MMHG | BODY MASS INDEX: 23.63 KG/M2 | HEIGHT: 66 IN | HEART RATE: 51 BPM | TEMPERATURE: 97.7 F | DIASTOLIC BLOOD PRESSURE: 59 MMHG | OXYGEN SATURATION: 95 % | RESPIRATION RATE: 18 BRPM

## 2024-11-12 DIAGNOSIS — Z98.890 POSTOPERATIVE EYE STATE: Primary | ICD-10-CM

## 2024-11-12 LAB
ALBUMIN UR-MCNC: NEGATIVE MG/DL
ANION GAP SERPL CALCULATED.3IONS-SCNC: 15 MMOL/L (ref 7–15)
APPEARANCE UR: CLEAR
BILIRUB UR QL STRIP: NEGATIVE
BUN SERPL-MCNC: 28.8 MG/DL (ref 8–23)
CALCIUM SERPL-MCNC: 9.9 MG/DL (ref 8.8–10.4)
CHLORIDE SERPL-SCNC: 102 MMOL/L (ref 98–107)
COLOR UR AUTO: YELLOW
CREAT SERPL-MCNC: 1.82 MG/DL (ref 0.67–1.17)
EGFRCR SERPLBLD CKD-EPI 2021: 40 ML/MIN/1.73M2
GLUCOSE SERPL-MCNC: 135 MG/DL (ref 70–99)
GLUCOSE UR STRIP-MCNC: NEGATIVE MG/DL
HCO3 SERPL-SCNC: 21 MMOL/L (ref 22–29)
HGB UR QL STRIP: ABNORMAL
HYALINE CASTS: 8 /LPF
KETONES UR STRIP-MCNC: NEGATIVE MG/DL
LEUKOCYTE ESTERASE UR QL STRIP: NEGATIVE
MUCOUS THREADS #/AREA URNS LPF: PRESENT /LPF
NITRATE UR QL: NEGATIVE
PH UR STRIP: 5 [PH] (ref 5–7)
POTASSIUM SERPL-SCNC: 4.8 MMOL/L (ref 3.4–5.3)
RBC URINE: 4 /HPF
SODIUM SERPL-SCNC: 138 MMOL/L (ref 135–145)
SP GR UR STRIP: 1.01 (ref 1–1.03)
UROBILINOGEN UR STRIP-MCNC: NORMAL MG/DL
WBC URINE: 9 /HPF

## 2024-11-12 PROCEDURE — 80051 ELECTROLYTE PANEL: CPT | Performed by: EMERGENCY MEDICINE

## 2024-11-12 PROCEDURE — 81003 URINALYSIS AUTO W/O SCOPE: CPT | Performed by: EMERGENCY MEDICINE

## 2024-11-12 PROCEDURE — 36415 COLL VENOUS BLD VENIPUNCTURE: CPT | Performed by: EMERGENCY MEDICINE

## 2024-11-12 PROCEDURE — 999N000103 HC STATISTIC NO CHARGE FACILITY FEE

## 2024-11-12 PROCEDURE — G0463 HOSPITAL OUTPT CLINIC VISIT: HCPCS | Performed by: OPHTHALMOLOGY

## 2024-11-12 PROCEDURE — 80048 BASIC METABOLIC PNL TOTAL CA: CPT | Performed by: EMERGENCY MEDICINE

## 2024-11-12 ASSESSMENT — VISUAL ACUITY
OS_SC: BARELY LP
METHOD: SNELLEN - LINEAR
OS_SC: BARELY LP
METHOD: SNELLEN - LINEAR
OD_SC: PROSTHESIS
OD_SC: PROSTHESIS

## 2024-11-12 ASSESSMENT — TONOMETRY
OS_IOP_MMHG: 41
IOP_METHOD: TONOPEN
IOP_METHOD: TONOPEN
OS_IOP_MMHG: 39
IOP_METHOD: TONOPEN
OS_IOP_MMHG: 41
IOP_METHOD: TONOPEN
OS_IOP_MMHG: 39

## 2024-11-12 ASSESSMENT — SLIT LAMP EXAM - LIDS: COMMENTS: NORMAL

## 2024-11-12 ASSESSMENT — ACTIVITIES OF DAILY LIVING (ADL)
ADLS_ACUITY_SCORE: 0

## 2024-11-12 NOTE — ED TRIAGE NOTES
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 [place]. Cerebral palsy.

## 2024-11-12 NOTE — ED PROVIDER NOTES
"  History     Chief Complaint   Patient presents with    Urinary Retention     HPI  Bright Lockett is a 64 year old male with PMH notable for glaucoma s/p cornea surgery this past day, cerebral palsy, CKD 3  who presents to the ED with lack of urination.  Patient reports that he had a cornea surgery this past day.  He was advised that if he did not urinate by 9 PM that should come in to the emergency department for evaluation.  Patient reports that he last urinated this past morning.  He states he is not uncomfortable, does not have an overly strong urge to urinate.  Patient reports that the eye has mild discomfort but is not overly painful, seems to be healing well.    Physical Exam   BP: 132/60  Pulse: 65  Temp: 97.7  F (36.5  C)  Resp: 16  Height: 166.4 cm (5' 5.5\")  Weight: 66.7 kg (147 lb)  SpO2: 99 %    Physical Exam  General: no acute distress. Appears stated age.   HENT: MMM, no oropharyngeal lesions  Eyes: Eye shield over left eye.  Cardio: Regular rate. Regular rhythm. Extremities well perfused  Resp: Normal work of breathing, Normal respiratory rate.   Abdomen: no tenderness, non-distended, no rebound, no guarding.  No CVA tenderness.  Neuro: alert without signs of confusion. CN II-XII grossly intact. Grossly normal strength and sensation in all extremities.   Psych: normal affect, normal behavior      ED Course      Procedures  Results for orders placed during the hospital encounter of 11/11/24    POC US RETROPERITONEAL LIMITED    Impression  Limited Bedside ED Renal & Bladder Ultrasound:  PERFORMED BY: Dr. Omkar Magdaleno MD  INDICATIONS:  Inability to void  PROBE: Low frequency convex probe  BODY LOCATION:  Abdomen (retroperitoneum and bladder)  FINDINGS:  The ultrasound was performed with longitudinal and transverse views of the kidneys and bladder.  Right Kidney: No hydronephrosis. No visualized cysts.  Left Kidney: No hydronephrosis. No visualized cysts.  Bladder: 7.6 x 7.4 x 8.2 cm, consistent " with volume of 242 ml.  INTERPRETATION:  No hydronephrosis. No visualized renal cysts. Bladder volume 242 ml.  IMAGE DOCUMENTATION: Images were archived to PACs system.              Labs Ordered and Resulted from Time of ED Arrival to Time of ED Departure   ROUTINE UA WITH MICROSCOPIC REFLEX TO CULTURE - Abnormal       Result Value    Color Urine Yellow      Appearance Urine Clear      Glucose Urine Negative      Bilirubin Urine Negative      Ketones Urine Negative      Specific Gravity Urine 1.013      Blood Urine Trace (*)     pH Urine 5.0      Protein Albumin Urine Negative      Urobilinogen Urine Normal      Nitrite Urine Negative      Leukocyte Esterase Urine Negative      Mucus Urine Present (*)     RBC Urine 4 (*)     WBC Urine 9 (*)     Hyaline Casts Urine 8 (*)    BASIC METABOLIC PANEL - Abnormal    Sodium 138      Potassium 4.8      Chloride 102      Carbon Dioxide (CO2) 21 (*)     Anion Gap 15      Urea Nitrogen 28.8 (*)     Creatinine 1.82 (*)     GFR Estimate 40 (*)     Calcium 9.9      Glucose 135 (*)      POC US RETROPERITONEAL LIMITED   Final Result   Limited Bedside ED Renal & Bladder Ultrasound:   PERFORMED BY: Dr. Omkar Magdaleno MD   INDICATIONS:  Inability to void   PROBE: Low frequency convex probe   BODY LOCATION:  Abdomen (retroperitoneum and bladder)   FINDINGS:  The ultrasound was performed with longitudinal and transverse views of the kidneys and bladder.    Right Kidney: No hydronephrosis. No visualized cysts.     Left Kidney: No hydronephrosis. No visualized cysts.    Bladder: 7.6 x 7.4 x 8.2 cm, consistent with volume of 242 ml.    INTERPRETATION:  No hydronephrosis. No visualized renal cysts. Bladder volume 242 ml.    IMAGE DOCUMENTATION: Images were archived to PACs system.              Medical Decision Making  The patient's presentation was of high complexity (an acute health issue posing potential threat to life or bodily function) - lack of urination with risk of impaired kidney  function.    The patient's evaluation involved:  review of external note(s) from 2 sources (anesthesia and ophtho 11/11/2024)  ordering and/or review of 3+ test(s) in this encounter (see separate area of note for details)    The patient's management necessitated only low risk treatment.      Assessments & Plan   Patient presenting with lack of urination. Vitals in the ED unremarkable. Nursing notes reviewed. Initial differential diagnosis includes but not limited to urinary retention, decreased urine production, BRANDEN.     Bedside renal/bladder ultrasound demonstrated 242 mL of urine in the bladder, no hydronephrosis.  Patient prolonged time without urinating raising suspicion for decreased urination due to renal issue.  BMP then performed, which showed creatinine 1.82, improved from 1.95 previously.  Electrolytes unremarkable.    Patient was able to urinate in the emergency department.  UA was not consistent with UTI and patient was without symptoms suggestive of UTI.    The complete clinical picture is most consistent with decreased urination. After counseling on the diagnosis, work-up, and treatment plan, the patient was discharged to home. The patient was advised to follow-up with ophthalmology this coming day as already scheduled. The patient was advised to return to the ED if worsening symptoms, or any urgent health concerns.     Final diagnoses:   Decreased urination     Discharge Medication List as of 11/12/2024  4:33 AM        --  Omkar Magdaleno MD   Emergency Medicine   [facility] EMERGENCY DEPARTMENT  11/11/2024       Omkar Magdaleno MD  11/12/24 0538

## 2024-11-12 NOTE — NURSING NOTE
Chief Complaints and History of Present Illnesses   Patient presents with    Post Op (Ophthalmology) Left Eye     Chief Complaint(s) and History of Present Illness(es)       Post Op (Ophthalmology) Left Eye              Laterality: left eye    Onset: 1 day ago              Comments    1 day s/p Temporary KPRO/IOL extraction/25G PPV/shunt revision LE 11/11/2024-Pt. States that his LE has been very irritated. No pain BE.   Corin Negron COT 9:22 AM November 12, 2024

## 2024-11-12 NOTE — PROGRESS NOTES
Chief Complaint/Presenting Concern: Postoperative visit     History of Present Illness:   Bright Lockett is a 63 year old patient who presents for post op. Per chart note from [facility] on 07/07/23, he previously was followed by Dr. Ritter, but had not been on drops for years at that time. At that visit, his eye pressure was in the 30s. He has a history of glaucoma, Reiger's anomaly, and a cataract in the left eye.     Today: POD1 s/p PKP/IOL removal/tube reposition/vitrectomy left eye (Dr. Hamilton, Dr. Archibald, and Dr. Hager).     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 Dillon); Per patient, has had at least two surgeries in the left eye  CEIOL/Ahmed 12/18/23  Maximum intraocular pressure x/35 mmHg  Currently Meds: Timolol twice daily in left eye (previously at up to three eye drops)  Family history: positive: Possibly mother  CCT (um): 11/14/2023: x/555  Gonio: 11/14/2023:   Trauma history: negative  Steroid exposure: negative  Vasospastic disease: Migrane/Raynaud phenomenon: positive, Migraines  A past hemodynamic crisis or Low BP:: positive: History of GI surgery to remove blockage, patient has to be resuscitated on table  Meds AEs/intolerance: None  PMHx: Heart failure, Chronic kidney disease  Anticoagulants:  None  Previous testing:  Visual field November 14, 2023: Unable to obtain   OCT Optic Nerve RNFL Spectralis November 14, 2023  right eye: X  left eye: Unable to obtain adequate image    Prior testing:  Inferior haptic appears to be touching cornea with diffuse D-folds  Heme overlying optic    Additional Ocular History:   Enucleation, right eye (~1975)    Kisha's anomaly, left eye    Pseudophakia, left eye    Plan/Recommendations:  Discussed findings with patient.  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   Underwent CEIOL/JOYCE on 12/18/23 with post-op hyphema. Inferior haptic appeared displaced into the AC.    Today: POD1 s/p PKP/IOL removal/tube reposition/vitrectomy left eye (Dr. Hamilton, Dr. Archibald, and Dr. Hager). IOP elevated today, can not visualize the tube posteriorly, unclear if any debris clogging the tube versus IOP elevated given off drops since last night. Will restart glaucoma drops and re-assess next week.   Continue Cosopt BID left eye  Continue Brimonidine BID left eye   Continue Prednisolone Acetate (Pink or White Top) 1 drop 6 times a day on left eye  Start Ofloxacin 4 times daily in the left eye   Postop precautions and instructions given to patient.     RTC in 1 week [facility], IOP     Physician Attestation     Attending Physician Attestation:  Complete documentation of historical and exam elements from today's encounter can be found in the full encounter summary report (not reduplicated in this progress note). I personally obtained the chief complaint(s) and history of present illness. I confirmed and edited as necessary the review of systems, past medical/surgical history, family history, social history, and examination findings as documented by others; and I examined the patient myself. I personally reviewed the relevant tests, images, and reports as documented above. I formulated and edited as necessary the assessment and plan and discussed the findings  and management plan with the patient and any family members present at the time of the visit.  Flori Hamilton M.D., Glaucoma, November 12, 2024

## 2024-11-12 NOTE — DISCHARGE INSTRUCTIONS
Instructions from your doctor today:  Emergency Department (ED) testing is focused on the potential causes of your symptoms that are the most dangerous possibilities, and cannot cover every possibility. Based on the evaluation, it was deemed sufficiently safe to discharge and continue management through the clinics. Thus, follow-up is very important to assess for improvement/worsening, potential further testing, and potential treatment adjustments. If you were given opioid pain medications or other medications that can make you drowsy while in the ED, you should not drive for at least several hours and not until you feel completely back to normal.     Please make an appointment to follow up with:  -Ophthalmology clinic this coming day as already scheduled  - If you do not have a primary care provider, you can be seen in follow-up and establish care by calling any of the clinics below:     - [facility] (phone: [phone])     - Primary Care / [facility] (phone: [phone])   - Have your clinic provider review the results from today's visit with you again, including any potential follow-up or additional testing that may be needed based on the results. Occasionally, incidental findings are found on later review by radiologists that may need follow-up.     Return to the Emergency Department immediately if you have worsening symptoms, or any other urgent health concerns.

## 2024-11-12 NOTE — OP NOTE
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 Item Serial No.  Lot No. LRB No. Used Action   EYE CORNEA PROCESS FEE FOR [employer] - [id number] OD-C Lens/Eye Implant EYE CORNEA PROCESS FEE FOR [employer] [facility]  [employer]  Left 1 Implanted   EYE IMP OPTIGRAFT CORNEA 1/2 HALO HCO-HH1 - [id number] Bone/Tissue/Biologic EYE IMP OPTIGRAFT CORNEA 1/2 HALO HCO-HH1 [id number] TISSUE [employer]  Left 1 Implanted   EYE IMP GRAFT CORNEA 1/2 HALO 1/2 THICK HCO-HH1 - [id number] Bone/Tissue/Biologic EYE IMP OPTIGRAFT CORNEA 1/2 [employer]O HCO-HH1 [id number]   Left 1 Explanted   LENS [id number] 25.5 [employer] UV ASPHERIC BICONVEX IOL - [id number] Lens/Eye Implant LENS [id number] 25.5 CLAREON UV ASPHERIC BICONVEX IOL [id number] [employer]  Left 1 Explanted       DESCRIPTION OF PROCEDURE:    Betadine paint and drop in holding room  Prep and drape in usual manner in OR  Time out according to protocol   Superior traction suture 7-0 vicryl   Superotemporal fornix-based conjunctival flap to expose underlying tube.   The corneal patch graft was removed   Tube was removed from the anterior chamber and the scleral opening was closed with 7-0 vicryl suture and found water tight  Refer to the cornea and retina team for the PKP/Vitrectomy/IOL removal portion of the procedure   Attention was then shifted to the tube   23 gauge needle track made  into the pars plana   Tube threaded into the eye  Corneal patch graft placed over the anterior portion of the tube and secured with 7-0 vicryl  Conjunctiva closed with 8-0 sutures  Refer to the cornea team for the remaining portion of the PKP     The patient tolerated the procedure well. There were no unexpected findings or complications.

## 2024-11-12 NOTE — NURSING NOTE
Chief Complaints and History of Present Illnesses   Patient presents with    Post Op (Ophthalmology) Left Eye     Chief Complaint(s) and History of Present Illness(es)       Post Op (Ophthalmology) Left Eye              Laterality: left eye    Onset: 1 day ago              Comments    1 day s/p Temporary/IOL extraction/25G PPV/shunt revision LE 11/11/2024-Pt. States that his LE has been very irritated. No pain BE.  Corin Negron COT 9:22 AM November 12, 2024   Last edited by Corin Negron on 11/12/2024  9:26 AM

## 2024-11-12 NOTE — PROGRESS NOTES
POD1 s/p PKP/IOL removal/tube reposition/vitrectomy left eye (Dr. Hamilton, Dr. Archibald, and Dr. Hager) 11/11/24    History of Present Illness:   Bright Lockett is a 63 year old patient originally referred from [facility] on 07/07/23 for glaucoma, Reiger's anomaly, and a cataract in the left eye.     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 Hamilton, Dr. Archibald, and Dr. Hager). 11/11/24  Cornea (PKP) looks good  Tube superiorly with a fibrin strand stretched to its tip: I think this fibrin strand will resolve with topical steroids; I do not think this is vitreous strand  Retina attached with good laser for areas with HRVO       Additional Ocular History:   Enucleation, right eye (~1975)    Kisha's anomaly, left eye    Pseudophakia, left eye    Plan/Recommendations:  Discussed findings with patient.  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   Underwent CEIOL/JOYCE on 12/18/23 with post-op hyphema. Inferior haptic appeared displaced into the AC.    Today: POD1 s/p PKP/IOL removal/tube reposition/vitrectomy left eye (Dr. Hamilton, Dr. Archibald, and Dr. Hager). IOP elevated today, can not visualize the tube posteriorly, unclear if any debris clogging the tube versus IOP elevated given off drops since last night. Will restart glaucoma drops and re-assess next week.   Continue Cosopt BID left  eye  Continue Brimonidine BID left eye   Continue Prednisolone Acetate (Pink or White Top) 1 drop 6 times a day on left eye  Start Ofloxacin 4 times daily in the left eye   Postop precautions and instructions given to patient.     RTC in 1 week [facility], IOP     Complete documentation of historical and exam elements from today's encounter can be found in the full encounter summary report (not reduplicated in this progress note). I personally obtained the chief complaint(s) and history of present illness.  I confirmed and edited as necessary the review of systems, past medical/surgical history, family history, social history, and examination findings as documented by others; and I examined the patient myself. I personally reviewed the relevant tests, images, and reports as documented above. I formulated and edited as necessary the assessment and plan and discussed the findings and management plan with the patient and family.    Dre Hager MD, PhD

## 2024-11-12 NOTE — PROGRESS NOTES
Prednisolone, ofloxacin  and brimonidine given per Dr. Hager. Pt out of Cosopt, told me that he may have more at home. Miller shield placed on left eye with cloth tape. AVS printed, patient  given direct number for retina office. Advised to call if any other drops need to be refilled.

## 2024-11-13 DIAGNOSIS — R35.0 URINARY FREQUENCY: ICD-10-CM

## 2024-11-13 RX ORDER — OXYBUTYNIN CHLORIDE 5 MG/1
5 TABLET ORAL AT BEDTIME
Qty: 90 TABLET | Refills: 3 | Status: SHIPPED | OUTPATIENT
Start: 2024-11-13

## 2024-11-13 NOTE — CONFIDENTIAL NOTE
Refill prescription approved per [facility] Refill Protocol. Last OV= 09/2024.    Pending Prescriptions:                       Disp   Refills    oxyBUTYnin (DITROPAN) 5 MG tablet         90 tab*3            Sig: Take 1 tablet (5 mg) by mouth at bedtime.      Nery THRASHER RN Urology 11/13/2024 2:31 PM

## 2024-11-16 ENCOUNTER — MYC MEDICAL ADVICE (OUTPATIENT)
Dept: OPHTHALMOLOGY | Facility: CLINIC | Age: 64
End: 2024-11-16
Payer: MEDICARE

## 2024-11-18 LAB — BACTERIA TISS BX CULT: NO GROWTH

## 2024-11-20 ENCOUNTER — OFFICE VISIT (OUTPATIENT)
Dept: OPHTHALMOLOGY | Facility: CLINIC | Age: 64
End: 2024-11-20
Attending: OPHTHALMOLOGY
Payer: MEDICARE

## 2024-11-20 DIAGNOSIS — Z98.890 POSTOPERATIVE EYE STATE: Primary | ICD-10-CM

## 2024-11-20 PROCEDURE — 76512 OPH US DX B-SCAN: CPT | Performed by: OPHTHALMOLOGY

## 2024-11-20 ASSESSMENT — VISUAL ACUITY
METHOD: SNELLEN - LINEAR
OD_SC: PROSTHESIS

## 2024-11-20 ASSESSMENT — TONOMETRY
IOP_METHOD: TONOPEN
OD_IOP_MMHG: PROS
OS_IOP_MMHG: 12

## 2024-11-20 ASSESSMENT — SLIT LAMP EXAM - LIDS: COMMENTS: NORMAL

## 2024-11-20 NOTE — NURSING NOTE
"Chief Complaints and History of Present Illnesses   Patient presents with    Post Op (Ophthalmology) Left Eye     1 week s/p LEFT TREPHINATION AND EXCISION SCARRED CORNEA, TEMPORARY KERATOPROSTHESIS, REMOVAL OF POSTERIOR CHAMBER INTRAOCULAR LENS, REMOVAL OF TEMPORARY PROSTHESIS AND PLACEMENT OF PENETRATING KERATOPROSTHESIS, LEFT VITRECTOMY, PARS PLANA APPROACH, USING 25-GAUGE INSTRUMENTS, ENDOLASER, LEFT SHUNT REVISION WITH CORNEAL PATCH GRAFT 11/11/214.      Chief Complaint(s) and History of Present Illness(es)       Post Op (Ophthalmology) Left Eye              Laterality: left eye    Quality: blurred vision    Associated symptoms: eye pain (light irritation) and burning (medication stings the eye).  Negative for flashes and floaters    Treatments tried: eye drops    Comments: 1 week s/p LEFT TREPHINATION AND EXCISION SCARRED CORNEA, TEMPORARY KERATOPROSTHESIS, REMOVAL OF POSTERIOR CHAMBER INTRAOCULAR LENS, REMOVAL OF TEMPORARY PROSTHESIS AND PLACEMENT OF PENETRATING KERATOPROSTHESIS, LEFT VITRECTOMY, PARS PLANA APPROACH, USING 25-GAUGE INSTRUMENTS, ENDOLASER, LEFT SHUNT REVISION WITH CORNEAL PATCH GRAFT 11/11/214.               Comments    \"I still see ambient light to some extent. I was told it would take up to 6 weeks to notice an improvement.\" Denies eye pain. \"My mind is giving me false images. My mind is playing tricks on me.\"     Ocular meds:     Cosopt BID left eye    Brimonidine BID left eye     Prednisolone Acetate (Pink or White Top) 1 drop 6 times a day on left eye    Ofloxacin 4 times daily in the left eye     \"Drops are getting in as regularly as they can.\"     Fay Sifuentes, COT 7:50 AM 11/20/2024                     "

## 2024-11-20 NOTE — PROGRESS NOTES
POW1 s/p PKP/IOL removal/tube reposition/vitrectomy left eye (Dr. Hamilton, Dr. Archibald, and Dr. Hager) 11/11/24    History of Present Illness:   Bright Lockett is a 64 year old patient originally referred from [facility] on 07/07/23 for glaucoma, Reiger's anomaly, and a cataract in the left eye.     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 Hamilton, Dr. Archibald, and Dr. Hager). 11/11/24  Cornea (PKP) looks good  Tube superiorly looks good: fibrin strand resolve (?)  Retina attached with good laser for areas with HRVO   Choroidal nevus stable      Additional Ocular History:   Enucleation, right eye (~1975)    Kisha's anomaly, left eye    Pseudophakia, left eye    Plan/Recommendations:  Discussed findings with patient.  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   Underwent CEIOL/JOYCE on 12/18/23 with post-op hyphema. Inferior haptic appeared displaced into the AC.      S/P PKP/IOL removal/tube reposition/vitrectomy left eye (Dr. Hamilton, Dr. Archibald, and Dr. Hager): looks good  but vision NLP/LP likely due to optic atrophy and retinal atrophy     Continue Cosopt BID left eye  Continue Brimonidine BID left eye   Continue Prednisolone Acetate (Pink or White Top) 1 drop 6 times a day on left eye  Continue Ofloxacin 4 times daily in the left eye   Postop precautions and instructions given to  patient.     RTC in 2 days to see Daniel Archibald and Alfonso and then in 3 weeks with retina for DFE and optos and OCT left eye only    Complete documentation of historical and exam elements from today's encounter can be found in the full encounter summary report (not reduplicated in this progress note). I personally obtained the chief complaint(s) and history of present illness.  I confirmed and edited as necessary the review of systems, past medical/surgical history, family history, social history, and examination findings as documented by others; and I examined the patient myself. I personally reviewed the relevant tests, images, and reports as documented above. I formulated and edited as necessary the assessment and plan and discussed the findings and management plan with the patient and family.    Dre Hager MD, PhD

## 2024-11-21 LAB — BACTERIA TISS BX CULT: NORMAL

## 2024-11-28 LAB — BACTERIA TISS BX CULT: NORMAL

## 2024-12-04 ENCOUNTER — TELEPHONE (OUTPATIENT)
Dept: OPHTHALMOLOGY | Facility: CLINIC | Age: 64
End: 2024-12-04
Payer: MEDICARE

## 2024-12-04 NOTE — TELEPHONE ENCOUNTER
Phone call from patient sister Leeanne to get a later post op appointment . Writer did inform patient and sister that post op care is normally completed in the morning .       Osmany Roblero on 12/4/2024 at 8:12 AM

## 2024-12-05 LAB — BACTERIA TISS BX CULT: NORMAL

## 2024-12-08 ENCOUNTER — MYC MEDICAL ADVICE (OUTPATIENT)
Dept: OPHTHALMOLOGY | Facility: CLINIC | Age: 64
End: 2024-12-08
Payer: MEDICARE

## 2024-12-08 DIAGNOSIS — Z98.890 POSTOPERATIVE EYE STATE: ICD-10-CM

## 2024-12-09 LAB — BACTERIA TISS BX CULT: NO GROWTH

## 2024-12-09 RX ORDER — OFLOXACIN 3 MG/ML
1 SOLUTION/ DROPS OPHTHALMIC 4 TIMES DAILY
Qty: 5 ML | Refills: 1 | Status: SHIPPED | OUTPATIENT
Start: 2024-12-09

## 2024-12-09 NOTE — TELEPHONE ENCOUNTER
Spoke to pt at 1800    last Saturday night-- tumble per pt    Pt states did not hit head    No new eye symptoms.    Rx for Ofloxacin sent to pt's pharmacy.      Torito Griffith RN 6:02 PM 12/09/24

## 2024-12-10 ENCOUNTER — TELEPHONE (OUTPATIENT)
Dept: OPHTHALMOLOGY | Facility: CLINIC | Age: 64
End: 2024-12-10
Payer: MEDICARE

## 2024-12-10 NOTE — TELEPHONE ENCOUNTER
LM on . Per Dr. Hamilton, she's asking if he can come at 7:30am on 12/13/24 to see her. She has a meeting during his original appt time. Ok to send [employer] message or call at [phone].     Fay Sifuentes, ALEK 4:46 PM 12/10/2024

## 2024-12-11 ENCOUNTER — OFFICE VISIT (OUTPATIENT)
Dept: OPHTHALMOLOGY | Facility: CLINIC | Age: 64
End: 2024-12-11
Attending: OPHTHALMOLOGY
Payer: MEDICARE

## 2024-12-11 DIAGNOSIS — Z98.890 POSTOPERATIVE EYE STATE: Primary | ICD-10-CM

## 2024-12-11 DIAGNOSIS — H40.52X3 GLAUCOMA ASSOCIATED WITH OCULAR DISORDER, LEFT, SEVERE STAGE: ICD-10-CM

## 2024-12-11 DIAGNOSIS — H40.9 ADVANCED STAGE GLAUCOMA: ICD-10-CM

## 2024-12-11 DIAGNOSIS — H17.9 CORNEAL OPACITY OF LEFT EYE: ICD-10-CM

## 2024-12-11 DIAGNOSIS — H34.8322 STABLE BRANCH RETINAL VEIN OCCLUSION OF LEFT EYE (H): ICD-10-CM

## 2024-12-11 PROCEDURE — 92134 CPTRZ OPH DX IMG PST SGM RTA: CPT | Performed by: OPHTHALMOLOGY

## 2024-12-11 PROCEDURE — 92250 FUNDUS PHOTOGRAPHY W/I&R: CPT | Performed by: OPHTHALMOLOGY

## 2024-12-11 ASSESSMENT — VISUAL ACUITY
METHOD: SNELLEN - LINEAR
OD_SC: PROS

## 2024-12-11 ASSESSMENT — SLIT LAMP EXAM - LIDS: COMMENTS: NORMAL

## 2024-12-11 ASSESSMENT — TONOMETRY
OS_IOP_MMHG: 6
IOP_METHOD: TONOPEN
OD_IOP_MMHG: PROS

## 2024-12-11 ASSESSMENT — CUP TO DISC RATIO: OS_RATIO: 0.8?

## 2024-12-11 NOTE — NURSING NOTE
"Chief Complaints and History of Present Illnesses   Patient presents with    Post Op (Ophthalmology) Left Eye     1 month S/P PKP/IOL removal/tube reposition/vitrectomy left eye 11/11/24.     Patient notes one of his eye drops has been stinging. He says that there was a lapse in one of the drops, but since starting it back up, it hasn't stung as much. It is the Ofloxacin. Patient notes his left eye vision is very poor. \"I find it terribly disappointing. I feel like I am letting people down because I am not seeing things. I had a stumble Saturday night, and it didn't alter my sight.\"      Chief Complaint(s) and History of Present Illness(es)       Post Op (Ophthalmology) Left Eye              Laterality: left eye    Onset: months ago    Quality: blurred vision    Associated symptoms: eye pain (\"very light discomfort in OS\") and burning (stinging \"rims the edge of the eye\"; mostly in upper right quadrant).  Negative for flashes and floaters    Treatments tried: eye drops and artificial tears    Comments: 1 month S/P PKP/IOL removal/tube reposition/vitrectomy left eye 11/11/24.     Patient notes one of his eye drops has been stinging. He says that there was a lapse in one of the drops, but since starting it back up, it hasn't stung as much. It is the Ofloxacin. Patient notes his left eye vision is very poor. \"I find it terribly disappointing. I feel like I am letting people down because I am not seeing things. I had a stumble Saturday night, and it didn't alter my sight.\"               Comments    \"The last time I saw a floater was months ago. I was told the octopus system was trying to see something. I found that comforting.\"     Ocular meds:   - Cosopt BID left eye   - Brimonidine BID left eye   - Prednisolone QID left eye   - Ofloxacin QID left eye   - Refresh QID left eye     Fay Sifuentes, ALEK 12:43 PM 12/11/2024                       "

## 2024-12-11 NOTE — PROGRESS NOTES
s/p PKP/IOL removal/tube reposition/vitrectomy left eye (Dr. Hamilton, Dr. Archibald, and Dr. Hager) 11/11/24. Patient notes stinging of the eye that is slowly getting better. No vision changes.     History of Present Illness:   Bright Lockett is a 64 year old patient originally referred from [facility] on 07/07/23 for glaucoma, Reiger's anomaly, and a cataract in the left eye.     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 Hamilton, Dr. Archibald, and Dr. Hager). 11/11/24  Cornea (PKP) looks good  Tube superiorly looks good: fibrin strand resolve (?)  Retina attached with good laser for areas with HRVO   Choroidal nevus stable      Additional Ocular History:   # Enucleation, right eye (~1975)    # Axenfeld Kisha syndrome, left eye  # s/p PPV/PKP/glaucoma tube 11/11/24; vision LP  # CRVO left eye: may benefit from IV Avastin but 1- optic nerve is totally cupped and there is little chance of vision recovery, 2- eye has a fresh corneal graft that may dehisce, and 3- there is risk of infection with injection so I do not recommend injection at this point  # advanced optic atrophy left eye   # aphakia, left eye     Continue drops as below for now; they will be adjusted by Dr. Hamilton and Dr. Archibald in 2  days    Continue Cosopt BID left eye  Continue Brimonidine BID left eye   Continue Prednisolone Acetate (Pink Top) 1 drop 4 times a day on left eye  Continue Ofloxacin 4 times daily in the left eye   Postop precautions and instructions given to patient.     RTC in 3 months with retina for DFE and optos and OCT left eye only  Patient has appointments with Dr. Hamilton and Dr. Archibald 12/13/2024    Medina Zacarias MD  PGY-3  Department of Ophthalmology  December 11, 2024 1:49 PM       Complete documentation of historical and exam elements from today's encounter can be found in the full encounter summary report (not reduplicated in this progress note). I personally obtained the chief complaint(s) and history of present illness.  I confirmed and edited as necessary the review of systems, past medical/surgical history, family history, social history, and examination findings as documented by others; and I examined the patient myself. I personally reviewed the relevant tests, images, and reports as documented above. I formulated and edited as necessary the assessment and plan and discussed the findings and management plan with the patient and family.    Dre Hager MD, PhD

## 2024-12-14 DIAGNOSIS — N40.1 BENIGN PROSTATIC HYPERPLASIA WITH LOWER URINARY TRACT SYMPTOMS, SYMPTOM DETAILS UNSPECIFIED: ICD-10-CM

## 2024-12-14 DIAGNOSIS — I10 ESSENTIAL HYPERTENSION: ICD-10-CM

## 2024-12-16 RX ORDER — TAMSULOSIN HYDROCHLORIDE 0.4 MG/1
CAPSULE ORAL
Qty: 90 CAPSULE | Refills: 3 | Status: SHIPPED | OUTPATIENT
Start: 2024-12-16

## 2024-12-16 RX ORDER — LOSARTAN POTASSIUM 100 MG/1
100 TABLET ORAL DAILY
Qty: 90 TABLET | Refills: 3 | Status: SHIPPED | OUTPATIENT
Start: 2024-12-16

## 2024-12-27 ENCOUNTER — MYC REFILL (OUTPATIENT)
Dept: FAMILY MEDICINE | Facility: CLINIC | Age: 64
End: 2024-12-27
Payer: MEDICARE

## 2024-12-27 DIAGNOSIS — N40.1 BENIGN PROSTATIC HYPERPLASIA WITH LOWER URINARY TRACT SYMPTOMS, SYMPTOM DETAILS UNSPECIFIED: ICD-10-CM

## 2024-12-29 RX ORDER — FINASTERIDE 5 MG/1
TABLET, FILM COATED ORAL
Qty: 90 TABLET | Refills: 2 | Status: SHIPPED | OUTPATIENT
Start: 2024-12-29

## 2025-01-02 ENCOUNTER — PATIENT OUTREACH (OUTPATIENT)
Dept: CARE COORDINATION | Facility: CLINIC | Age: 65
End: 2025-01-02
Payer: MEDICARE

## 2025-01-09 DIAGNOSIS — H17.9 CORNEAL OPACITY OF LEFT EYE: ICD-10-CM

## 2025-01-09 RX ORDER — PREDNISOLONE ACETATE 10 MG/ML
1-2 SUSPENSION/ DROPS OPHTHALMIC 4 TIMES DAILY
Qty: 10 ML | Refills: 2 | Status: SHIPPED | OUTPATIENT
Start: 2025-01-09

## 2025-01-09 NOTE — TELEPHONE ENCOUNTER
Spoke to pt at 1600    Pt aware Rx for prednisolone eye drop sent to pharmacy-- one drop in left eye 4/day    Torito Griffith RN 4:02 PM 01/09/25

## 2025-01-09 NOTE — TELEPHONE ENCOUNTER
prednisoLONE acetate (PRED FORTE) 1 % ophthalmic suspension    Last Written Prescription Date:  11/11/24  Last Fill Quantity: 5 ml ,   # refills: 1  Last Office Visit : 12/13/24 Good post op apearence  No complications of the procedure  Continue Predforte QID   Stop moxi  PFAT q3h OS     Follow up:  Oph: 1 M  Future Office visit:  1/17/25    Routing refill request to provider for review/approval because:  Not on protocol for [facility] refillprednisoLONE acetate (PRED FORTE) 1 % ophthalmic suspension

## 2025-01-11 DIAGNOSIS — K21.9 GASTROESOPHAGEAL REFLUX DISEASE WITHOUT ESOPHAGITIS: ICD-10-CM

## 2025-01-16 ENCOUNTER — TELEPHONE (OUTPATIENT)
Dept: OPHTHALMOLOGY | Facility: CLINIC | Age: 65
End: 2025-01-16
Payer: MEDICARE

## 2025-01-16 NOTE — TELEPHONE ENCOUNTER
LM on  for pt's sister, Leeanne. Dr. Hamilton has an emergency surgery tomorrow so we need to cancel his appt with her. We will keep the appt with Dr. Archibald. Dr. Hamilton will discuss with Dr. Archibald regarding his pressures.     Left my direct number if she has any questions.     Fay Sifuentes, ALEK 4:34 PM 01/16/2025

## 2025-01-28 ENCOUNTER — MYC MEDICAL ADVICE (OUTPATIENT)
Dept: FAMILY MEDICINE | Facility: CLINIC | Age: 65
End: 2025-01-28

## 2025-01-28 NOTE — TELEPHONE ENCOUNTER
Bright wrote me a paper letter on 1/5/2025.  This got deposited deep in my file and I did not see it until today, 1/28.  On this letter he detailed that his average systolic blood pressure was running about .  I contacted him back today and recommend that he discontinue clonidine by decreasing it from 2 pills a day to 1 pill a day for 1 week then stop.  He is taking 0.1 mg pills.  Then contact me back in 1 week with blood pressure update

## 2025-01-31 PROBLEM — E26.9 HIGH ALDOSTERONE TO RENIN RATIO: Status: ACTIVE | Noted: 2025-01-31

## 2025-02-03 ENCOUNTER — TELEPHONE (OUTPATIENT)
Dept: ENDOCRINOLOGY | Facility: CLINIC | Age: 65
End: 2025-02-03
Payer: MEDICARE

## 2025-02-03 NOTE — TELEPHONE ENCOUNTER
Left Voicemail (1st Attempt) for the patient to call back and schedule the following:    Appointment type: return  Provider: dr. page  Return date: 3/31/2025  Specialty phone number: [phone]   Additional appointment(s) needed: Please schedule using 12:00 noon lunch   Additonal Notes: Return in about 2 months (around 3/31/2025).     [facility]   Orthopedics, Podiatry, Sports Medicine, Ent ,Eye , Audiology, Adult Endocrine & Diabetes, Nutrition & Medication Therapy Management Specialties   [facility]

## 2025-02-05 ENCOUNTER — LAB (OUTPATIENT)
Dept: LAB | Facility: CLINIC | Age: 65
End: 2025-02-05
Payer: MEDICARE

## 2025-02-05 DIAGNOSIS — N18.31 STAGE 3A CHRONIC KIDNEY DISEASE (H): ICD-10-CM

## 2025-02-05 DIAGNOSIS — E26.9 HIGH ALDOSTERONE TO RENIN RATIO: ICD-10-CM

## 2025-02-05 DIAGNOSIS — I10 HYPERTENSION, UNSPECIFIED TYPE: ICD-10-CM

## 2025-02-05 DIAGNOSIS — Z86.39 HISTORY OF HYPOKALEMIA: ICD-10-CM

## 2025-02-05 DIAGNOSIS — R73.03 PREDIABETES: ICD-10-CM

## 2025-02-05 LAB
ALBUMIN SERPL BCG-MCNC: 4.2 G/DL (ref 3.5–5.2)
ANION GAP SERPL CALCULATED.3IONS-SCNC: 11 MMOL/L (ref 7–15)
BUN SERPL-MCNC: 22 MG/DL (ref 8–23)
CALCIUM SERPL-MCNC: 9.3 MG/DL (ref 8.8–10.4)
CHLORIDE SERPL-SCNC: 104 MMOL/L (ref 98–107)
CREAT SERPL-MCNC: 1.54 MG/DL (ref 0.67–1.17)
EGFRCR SERPLBLD CKD-EPI 2021: 49 ML/MIN/1.73M2
EST. AVERAGE GLUCOSE BLD GHB EST-MCNC: 131 MG/DL
GLUCOSE SERPL-MCNC: 102 MG/DL (ref 70–99)
HBA1C MFR BLD: 6.2 % (ref 0–5.6)
HCO3 SERPL-SCNC: 22 MMOL/L (ref 22–29)
PHOSPHATE SERPL-MCNC: 3.5 MG/DL (ref 2.5–4.5)
POTASSIUM SERPL-SCNC: 4.3 MMOL/L (ref 3.4–5.3)
SODIUM SERPL-SCNC: 137 MMOL/L (ref 135–145)

## 2025-02-05 PROCEDURE — 83036 HEMOGLOBIN GLYCOSYLATED A1C: CPT

## 2025-02-05 PROCEDURE — 80069 RENAL FUNCTION PANEL: CPT

## 2025-02-05 PROCEDURE — 84244 ASSAY OF RENIN: CPT | Mod: 90

## 2025-02-05 PROCEDURE — 36415 COLL VENOUS BLD VENIPUNCTURE: CPT

## 2025-02-05 PROCEDURE — 99000 SPECIMEN HANDLING OFFICE-LAB: CPT

## 2025-02-08 DIAGNOSIS — I10 ESSENTIAL HYPERTENSION: ICD-10-CM

## 2025-02-08 LAB
ALDOST/RENIN PLAS-RTO: 102 {RATIO} (ref 0–25)
RENIN PLAS-CCNC: 0.2 NG/ML/HR

## 2025-02-09 DIAGNOSIS — N18.31 STAGE 3A CHRONIC KIDNEY DISEASE (H): ICD-10-CM

## 2025-02-09 DIAGNOSIS — E26.09 PRIMARY ALDOSTERONISM: Primary | ICD-10-CM

## 2025-02-10 ENCOUNTER — TELEPHONE (OUTPATIENT)
Dept: ENDOCRINOLOGY | Facility: CLINIC | Age: 65
End: 2025-02-10
Payer: MEDICARE

## 2025-02-10 RX ORDER — CLONIDINE HYDROCHLORIDE 0.1 MG/1
TABLET ORAL
Qty: 360 TABLET | Refills: 0 | Status: SHIPPED | OUTPATIENT
Start: 2025-02-10

## 2025-02-10 NOTE — TELEPHONE ENCOUNTER
2/10 Called and left voicemail, provided phone number [phone] to schedule a 1200 appointment before 3/31/25 with ct prior.     [facility]   Orthopedics, Podiatry, Sports Medicine, Ent ,Eye , Audiology, Adult Endocrine & Diabetes, Nutrition & Medication Therapy Management Specialties   [facility] [place]          ----- Message from Deedee MUSTAFA sent at 2/10/2025  8:32 AM [facility] -----  Eric Bliss MD  P [facility] Endocrinology Adult [place]  Please schedule a follow-up appointment by 3/31/25 using 12 noon.  Please assist to schedule CT adrenal prior to follow-up appointment.

## 2025-02-20 DIAGNOSIS — R60.9 EDEMA, UNSPECIFIED TYPE: ICD-10-CM

## 2025-02-20 DIAGNOSIS — I10 ESSENTIAL HYPERTENSION: ICD-10-CM

## 2025-02-20 RX ORDER — FUROSEMIDE 20 MG/1
20 TABLET ORAL DAILY
Qty: 90 TABLET | Refills: 0 | Status: SHIPPED | OUTPATIENT
Start: 2025-02-20

## 2025-03-26 ENCOUNTER — OFFICE VISIT (OUTPATIENT)
Dept: FAMILY MEDICINE | Facility: CLINIC | Age: 65
End: 2025-03-26
Payer: MEDICARE

## 2025-03-26 ENCOUNTER — ANCILLARY PROCEDURE (OUTPATIENT)
Dept: GENERAL RADIOLOGY | Facility: CLINIC | Age: 65
End: 2025-03-26
Attending: FAMILY MEDICINE
Payer: MEDICARE

## 2025-03-26 VITALS
DIASTOLIC BLOOD PRESSURE: 68 MMHG | RESPIRATION RATE: 20 BRPM | OXYGEN SATURATION: 97 % | HEART RATE: 47 BPM | SYSTOLIC BLOOD PRESSURE: 120 MMHG | BODY MASS INDEX: 22.94 KG/M2 | TEMPERATURE: 98 F | WEIGHT: 140 LBS

## 2025-03-26 DIAGNOSIS — E78.00 HYPERCHOLESTEROLEMIA: Primary | ICD-10-CM

## 2025-03-26 DIAGNOSIS — R63.4 WEIGHT LOSS: ICD-10-CM

## 2025-03-26 DIAGNOSIS — I13.0 HYPERTENSIVE HEART AND CHRONIC KIDNEY DISEASE WITH HEART FAILURE AND STAGE 1 THROUGH STAGE 4 CHRONIC KIDNEY DISEASE, OR UNSPECIFIED CHRONIC KIDNEY DISEASE (H): ICD-10-CM

## 2025-03-26 DIAGNOSIS — H34.8322 STABLE BRANCH RETINAL VEIN OCCLUSION OF LEFT EYE (H): ICD-10-CM

## 2025-03-26 DIAGNOSIS — Z29.11 NEED FOR VACCINATION AGAINST RESPIRATORY SYNCYTIAL VIRUS: ICD-10-CM

## 2025-03-26 DIAGNOSIS — N62 GYNECOMASTIA: ICD-10-CM

## 2025-03-26 DIAGNOSIS — Z23 NEED FOR SHINGLES VACCINE: ICD-10-CM

## 2025-03-26 DIAGNOSIS — K21.9 GASTROESOPHAGEAL REFLUX DISEASE WITHOUT ESOPHAGITIS: ICD-10-CM

## 2025-03-26 DIAGNOSIS — D12.6 BENIGN NEOPLASM OF COLON, UNSPECIFIED PART OF COLON: ICD-10-CM

## 2025-03-26 DIAGNOSIS — G80.9 INFANTILE CEREBRAL PALSY (H): ICD-10-CM

## 2025-03-26 DIAGNOSIS — K21.00 GASTROESOPHAGEAL REFLUX DISEASE WITH ESOPHAGITIS WITHOUT HEMORRHAGE: ICD-10-CM

## 2025-03-26 PROCEDURE — 99214 OFFICE O/P EST MOD 30 MIN: CPT | Performed by: FAMILY MEDICINE

## 2025-03-26 PROCEDURE — 71046 X-RAY EXAM CHEST 2 VIEWS: CPT | Mod: TC | Performed by: RADIOLOGY

## 2025-03-26 PROCEDURE — 90480 ADMN SARSCOV2 VAC 1/ONLY CMP: CPT | Performed by: FAMILY MEDICINE

## 2025-03-26 PROCEDURE — 91320 SARSCV2 VAC 30MCG TRS-SUC IM: CPT | Performed by: FAMILY MEDICINE

## 2025-03-26 PROCEDURE — 3078F DIAST BP <80 MM HG: CPT | Performed by: FAMILY MEDICINE

## 2025-03-26 PROCEDURE — 3074F SYST BP LT 130 MM HG: CPT | Performed by: FAMILY MEDICINE

## 2025-03-26 PROCEDURE — G2211 COMPLEX E/M VISIT ADD ON: HCPCS | Performed by: FAMILY MEDICINE

## 2025-03-26 RX ORDER — OMEPRAZOLE 20 MG/1
20 CAPSULE, DELAYED RELEASE ORAL DAILY PRN
Qty: 90 CAPSULE | Refills: 2 | Status: SHIPPED | OUTPATIENT
Start: 2025-03-26

## 2025-03-26 NOTE — ASSESSMENT & PLAN NOTE
Question about proper dosage, previously was on omeprazole 40 mg a day.  More recently prescribed 20 but does not feel like he has any heartburn symptoms currently.  Recommend using this on an as-needed basis, if he is having spells of problems, go ahead and use it for 2 or 3 weeks at a time, then stop.

## 2025-03-26 NOTE — ASSESSMENT & PLAN NOTE
Bilateral breast pain likely related to spironolactone.  Pain is very minimal, breast buds are barely detectable.  Reassured.

## 2025-03-26 NOTE — ASSESSMENT & PLAN NOTE
10 pounds since November.  Looks like about 30 pounds in the last year  Unexplained, feels like he gets to a certain point with eating and does not have appetite beyond that.  No fever or chills.  Patient's kidney function has significantly worsened in the last year.  Denies mood is a problem despite loss of vision  Adequate food supply    Plan is to check chest x-ray today as part of workup.  See back for annual wellness visit in May to reassess ongoing weight loss.  If persistent, consider further evaluation and referral to GI.

## 2025-03-26 NOTE — PROGRESS NOTES
Assessment/ Plan  Esophageal reflux  Question about proper dosage, previously was on omeprazole 40 mg a day.  More recently prescribed 20 but does not feel like he has any heartburn symptoms currently.  Recommend using this on an as-needed basis, if he is having spells of problems, go ahead and use it for 2 or 3 weeks at a time, then stop.    Benign neoplasm of colon  Referral for follow-up colonoscopy.  Sister would drive    Gynecomastia  Bilateral breast pain likely related to spironolactone.  Pain is very minimal, breast buds are barely detectable.  Reassured.    Weight loss  10 pounds since November.  Looks like about 30 pounds in the last year  Unexplained, feels like he gets to a certain point with eating and does not have appetite beyond that.  No fever or chills.  Patient's kidney function has significantly worsened in the last year.  Denies mood is a problem despite loss of vision  Adequate food supply    Plan is to check chest x-ray today as part of workup.  See back for annual wellness visit in May to reassess ongoing weight loss.  If persistent, consider further evaluation and referral to GI.    Stable branch retinal vein occlusion of left eye (H)  Vision did not return with corneal transplant unfortunately.    Cerebral Palsy  Stable   Subjective  CC:  chief complaint  HPI:  Wt Readings from Last 3 Encounters:   03/26/25 63.5 kg (140 lb)   11/11/24 66.7 kg (147 lb)   11/11/24 68.3 kg (150 lb 9.2 oz)   Bright is here with a few questions.  See discussion above.  Main concern is that he has been having some breast tenderness for the last 2 months.  Really severe at first, now much less severe.  Indicates that he is noticed some lumps.  Reviewed note from endocrinology who noted abnormal kidney labs and referred him to nephrology.  Due for colonoscopy.  Patient notes that he is lost weight.  Feels like he gets to a point where he does not want to eat anymore when he is having a meal.  No abdominal pain.  No  nausea or vomiting.  No fevers or chills.  Has had change in his health and that his kidney function is worsened.  Patient's corneal transplant while technically successful did not return to site.    Prince was too damaged for this  Answers submitted by the patient for this visit:  CKD Visit (Submitted on 3/26/2025)  Chief Complaint: Chronic problems general questions HPI Form  Do you take any over the counter pain medicine?  : No  General Questionnaire (Submitted on 3/26/2025)  Chief Complaint: Chronic problems general questions HPI Form  What is the reason for your visit today? : pain in breast and kidney  How many servings of fruits and vegetables do you eat daily?: 0-1  On average, how many sweetened beverages do you drink each day (Examples: soda, juice, sweet tea, etc.  Do NOT count diet or artificially sweetened beverages)?: 0  How many minutes a day do you exercise enough to make your heart beat faster?: 9 or less  How many days a week do you exercise enough to make your heart beat faster?: 3 or less  How many days per week do you miss taking your medication?: 0  Questionnaire about: Chronic problems general questions HPI Form (Submitted on 3/26/2025)  Chief Complaint: Chronic problems general questions HPI Form  PFSH:  Patient Active Problem List   Diagnosis    Unspecified glaucoma    Neuritis    Essential hypertension    Osteoarthritis Of The Knee    Cerebral Palsy    Left Ventricular Hypertrophy    Esophageal reflux    Aortic Sclerosis    Benign Adenomatous Polyp Of The Large Intestine    Diverticulosis    Acute bilateral low back pain without sciatica    Proteinuria    Headache    Cough    Urinary frequency    Healthcare maintenance    Benign prostatic hyperplasia with lower urinary tract symptoms, symptom details unspecified    Daytime sleepiness    Cervical lymphadenopathy    Hyperglycemia    Stage 3a chronic kidney disease (H)    Edema, unspecified type    Heart failure with preserved ejection fraction  (H)    Left ventricular hypertrophy    Personal history of colon polyps, unspecified    Hypertensive heart and chronic kidney disease with heart failure and stage 1 through stage 4 chronic kidney disease, or unspecified chronic kidney disease (H)    Hearing loss, unspecified hearing loss type, unspecified laterality    Abnormal aldosterone to renin ratio    Corneal opacity of left eye    Tactile hallucinations    High aldosterone to renin ratio    Gynecomastia    Weight loss    Stable branch retinal vein occlusion of left eye (H)     Current Outpatient Medications   Medication Sig Dispense Refill    atorvastatin (LIPITOR) 20 MG tablet Take 1 tablet (20 mg) by mouth at bedtime 90 tablet 3    cloNIDine (CATAPRES) 0.1 MG tablet TAKE 2 TABLETS(0.2 MG) BY MOUTH TWICE DAILY 360 tablet 0    dorzolamide-timolol (COSOPT) 2-0.5 % ophthalmic solution Place 1 drop Into the left eye 2 times daily. 10 mL 5    finasteride (PROSCAR) 5 MG tablet TAKE ONE TABLET BY MOUTH ONCE DAILY 90 tablet 2    furosemide (LASIX) 20 MG tablet TAKE 1 TABLET(20 MG) BY MOUTH DAILY 90 tablet 0    losartan (COZAAR) 100 MG tablet TAKE 1 TABLET(100 MG) BY MOUTH DAILY 90 tablet 3    metoprolol succinate ER (TOPROL XL) 100 MG 24 hr tablet Take 1 tablet (100 mg) by mouth daily. 90 tablet 3    omeprazole (PRILOSEC) 20 MG DR capsule Take 1 capsule (20 mg) by mouth daily as needed. 90 capsule 2    oxyBUTYnin (DITROPAN) 5 MG tablet Take 1 tablet (5 mg) by mouth at bedtime. 90 tablet 3    prednisoLONE acetate (PRED FORTE) 1 % ophthalmic suspension Place 1-2 drops Into the left eye 4 times daily. 10 mL 2    prednisoLONE acetate (PRED FORTE) 1 % ophthalmic suspension Apply 1 drop to eye 4 times daily. 5 mL 1    spironolactone (ALDACTONE) 25 MG tablet Take 1 tablet (25 mg) by mouth daily 90 tablet 3    tamsulosin (FLOMAX) 0.4 MG capsule TAKE 1 CAPSULE BY MOUTH DAILY AFTER BREAKFAST 90 capsule 3     No current facility-administered medications for this visit.         History   Smoking Status    Never   Smokeless Tobacco    Never     Social History     Social History Narrative    Not on file     Patient Care Team:  Ladarius Soria MD as PCP - General (Family Medicine)  Ladarius Soria MD as Assigned PCP  Flori Hamilton MD as MD (Ophthalmology)  Leeanne Brooke MD as MD (Ophthalmology)  Dre Layton MD as Assigned Surgical Provider  Clint Gonzales MD as MD (Urology)  Eric Bliss MD as Assigned Endocrinology Provider        Objective  Physical Exam  Vitals:    03/26/25 1557 03/26/25 1602   BP: (!) 149/65 120/68   BP Location: Left arm    Patient Position: Sitting    Cuff Size: Adult Regular    Pulse: (!) 47    Resp: 20    Temp: 98  F (36.7  C)    TempSrc: Temporal    SpO2: 97%    Weight: 63.5 kg (140 lb)      Body mass index is 22.94 kg/m .  Gen- alert, oriented/ appropriately responsive, in wheelchair, hard of hearing.  HEENT- normal cephalic, atraumatic.  Artificial eye on the right, normal-appearing eye on the left  Chest- Normal inspiration and expiration.    Clear to ascultation.    No chest wall deformity or scar.  CV- Heart regular rate and rhythm  normal tones, no murmurs   No gallops or rubs.  Ext- appear well perfused, 1+ lower extremity edema  Skin- warm and dry,   no visualized rash    Diagnostics:   No results found for any visits on 03/26/25.        Please note: Voice recognition software was used in this dictation.  It may therefore contain typographical errors.

## 2025-03-26 NOTE — PROGRESS NOTES
{PROVIDER CHARTING PREFERENCE:230596}    Gabriela Novoa is a 65 year old, presenting for the following health issues:  Breast Pain (Right side, x2 months )      3/26/2025     3:55 PM   Additional Questions   Roomed by Tia   Accompanied by Sister Leeanne     History of Present Illness       CKD: He is not using over the counter pain medicine.     Reason for visit:  Pain in breast and kidney    He eats 0-1 servings of fruits and vegetables daily.He consumes 0 sweetened beverage(s) daily.He exercises with enough effort to increase his heart rate 9 or less minutes per day.  He exercises with enough effort to increase his heart rate 3 or less days per week.   He is taking medications regularly.        {MA/LPN/RN Pre-Provider Visit Orders- hCG/UA/Strep (Optional):189850}  {SUPERLIST (Optional):172386}  {additonal problems for provider to add (Optional):128451}    {ROS Picklists (Optional):370316}      Objective    BP (!) 149/65 (BP Location: Left arm, Patient Position: Sitting, Cuff Size: Adult Regular)   Pulse (!) 47   Temp 98  F (36.7  C) (Temporal)   Resp 20   Wt 63.5 kg (140 lb)   SpO2 97%   BMI 22.94 kg/m    Body mass index is 22.94 kg/m .  Physical Exam   {Exam List (Optional):153853}    {Diagnostic Test Results (Optional):673194}        Signed Electronically by: Ladarius Soria MD  {Email feedback regarding this note to [email or web address]   :[id number]}

## 2025-03-26 NOTE — PROGRESS NOTES
Prior to immunization administration, verified patients identity using patient s name and date of birth. Please see Immunization Activity for additional information.     Screening Questionnaire for Adult Immunization    Are you sick today?   No   Do you have allergies to medications, food, a vaccine component or latex?   Yes   Have you ever had a serious reaction after receiving a vaccination?   No   Do you have a long-term health problem with heart, lung, kidney, or metabolic disease (e.g., diabetes), asthma, a blood disorder, no spleen, complement component deficiency, a cochlear implant, or a spinal fluid leak?  Are you on long-term aspirin therapy?   Yes   Do you have cancer, leukemia, HIV/AIDS, or any other immune system problem?   No   Do you have a parent, brother, or sister with an immune system problem?   No   In the past 3 months, have you taken medications that affect  your immune system, such as prednisone, other steroids, or anticancer drugs; drugs for the treatment of rheumatoid arthritis, Crohn s disease, or psoriasis; or have you had radiation treatments?   Don't Know   Have you had a seizure, or a brain or other nervous system problem?   No   During the past year, have you received a transfusion of blood or blood    products, or been given immune (gamma) globulin or antiviral drug?   Don't Know   For women: Are you pregnant or is there a chance you could become       pregnant during the next month?   No   Have you received any vaccinations in the past 4 weeks?   No     Immunization questionnaire was positive for at least one answer.  Notified .      Patient instructed to remain in clinic for 15 minutes afterwards, and to report any adverse reactions.     Screening performed by Deneen Elizondo CMA on 3/26/2025 at 4:01 PM.        Answers submitted by the patient for this visit:  CKD Visit (Submitted on 3/26/2025)  Chief Complaint: Chronic problems general questions HPI Form  Do you take any  over the counter pain medicine?  : No  General Questionnaire (Submitted on 3/26/2025)  Chief Complaint: Chronic problems general questions HPI Form  What is the reason for your visit today? : pain in breast and kidney  How many servings of fruits and vegetables do you eat daily?: 0-1  On average, how many sweetened beverages do you drink each day (Examples: soda, juice, sweet tea, etc.  Do NOT count diet or artificially sweetened beverages)?: 0  How many minutes a day do you exercise enough to make your heart beat faster?: 9 or less  How many days a week do you exercise enough to make your heart beat faster?: 3 or less  How many days per week do you miss taking your medication?: 0  Questionnaire about: Chronic problems general questions HPI Form (Submitted on 3/26/2025)  Chief Complaint: Chronic problems general questions HPI Form

## 2025-03-27 ENCOUNTER — TELEPHONE (OUTPATIENT)
Dept: FAMILY MEDICINE | Facility: CLINIC | Age: 65
End: 2025-03-27
Payer: MEDICARE

## 2025-03-27 NOTE — RESULT ENCOUNTER NOTE
Please contact Bright and let him know that his chest x-ray looked okay.  I am asking you to call because he is vision impaired

## 2025-03-27 NOTE — TELEPHONE ENCOUNTER
----- Message from Ladarius Soria sent at 3/27/2025  8:08 AM CDT -----  Please contact Bright and let him know that his chest x-ray looked okay.  I am asking you to call because he is vision impaired

## 2025-03-27 NOTE — LETTER
March 31, 2025      Bright Lockett  [address] [place] [address]        Dear ,    We are writing to inform you of your test results.    chest x-ray looked okay     Resulted Orders   XR Chest 2 Views    Narrative    EXAM: XR CHEST 2 VIEWS  LOCATION: [facility]  DATE: 3/26/2025    INDICATION:  Weight loss  COMPARISON: Chest x-ray June 7, 2022, January 15, 2018, July 24, 2013.      Impression    IMPRESSION: Asymmetrically enlarged left hilum which appears to represent the pulmonary artery appears similar as on each of the prior exams. Otherwise negative chest x-ray.       If you have any questions or concerns, please call the clinic at the number listed above.       Sincerely,      Ladarius Soria MD    Electronically signed

## 2025-04-11 ENCOUNTER — HOSPITAL ENCOUNTER (EMERGENCY)
Facility: HOSPITAL | Age: 65
Discharge: HOME | End: 2025-04-11
Attending: EMERGENCY MEDICINE | Admitting: EMERGENCY MEDICINE
Payer: MEDICARE

## 2025-04-11 ENCOUNTER — APPOINTMENT (OUTPATIENT)
Dept: CT IMAGING | Facility: HOSPITAL | Age: 65
End: 2025-04-11
Attending: EMERGENCY MEDICINE
Payer: MEDICARE

## 2025-04-11 VITALS
HEART RATE: 54 BPM | TEMPERATURE: 96.7 F | RESPIRATION RATE: 16 BRPM | DIASTOLIC BLOOD PRESSURE: 60 MMHG | WEIGHT: 145 LBS | OXYGEN SATURATION: 98 % | BODY MASS INDEX: 23.76 KG/M2 | SYSTOLIC BLOOD PRESSURE: 118 MMHG

## 2025-04-11 DIAGNOSIS — R10.13 ABDOMINAL PAIN, EPIGASTRIC: ICD-10-CM

## 2025-04-11 DIAGNOSIS — K21.00 GASTROESOPHAGEAL REFLUX DISEASE WITH ESOPHAGITIS WITHOUT HEMORRHAGE: ICD-10-CM

## 2025-04-11 DIAGNOSIS — K86.2 CYSTIC MASS OF PANCREAS: ICD-10-CM

## 2025-04-11 LAB
ALBUMIN SERPL BCG-MCNC: 3.9 G/DL (ref 3.5–5.2)
ALBUMIN UR-MCNC: NEGATIVE MG/DL
ALP SERPL-CCNC: 77 U/L (ref 40–150)
ALT SERPL W P-5'-P-CCNC: 9 U/L (ref 0–70)
ANION GAP SERPL CALCULATED.3IONS-SCNC: 7 MMOL/L (ref 7–15)
APPEARANCE UR: CLEAR
AST SERPL W P-5'-P-CCNC: 18 U/L (ref 0–45)
BACTERIA #/AREA URNS HPF: ABNORMAL /HPF
BASOPHILS # BLD AUTO: 0.1 10E3/UL (ref 0–0.2)
BASOPHILS NFR BLD AUTO: 1 %
BILIRUB SERPL-MCNC: 1 MG/DL
BILIRUB UR QL STRIP: NEGATIVE
BUN SERPL-MCNC: 34.6 MG/DL (ref 8–23)
CALCIUM SERPL-MCNC: 8.9 MG/DL (ref 8.8–10.4)
CHLORIDE SERPL-SCNC: 104 MMOL/L (ref 98–107)
COLOR UR AUTO: ABNORMAL
CREAT SERPL-MCNC: 1.69 MG/DL (ref 0.67–1.17)
D DIMER PPP FEU-MCNC: 0.66 UG/ML FEU (ref 0–0.5)
EGFRCR SERPLBLD CKD-EPI 2021: 44 ML/MIN/1.73M2
EOSINOPHIL # BLD AUTO: 0.2 10E3/UL (ref 0–0.7)
EOSINOPHIL NFR BLD AUTO: 2 %
ERYTHROCYTE [DISTWIDTH] IN BLOOD BY AUTOMATED COUNT: 11.9 % (ref 10–15)
GLUCOSE SERPL-MCNC: 96 MG/DL (ref 70–99)
GLUCOSE UR STRIP-MCNC: NEGATIVE MG/DL
HCO3 SERPL-SCNC: 26 MMOL/L (ref 22–29)
HCT VFR BLD AUTO: 46.6 % (ref 40–53)
HGB BLD-MCNC: 15.3 G/DL (ref 13.3–17.7)
HGB UR QL STRIP: NEGATIVE
HYALINE CASTS: 4 /LPF
IMM GRANULOCYTES # BLD: 0 10E3/UL
IMM GRANULOCYTES NFR BLD: 0 %
KETONES UR STRIP-MCNC: NEGATIVE MG/DL
LEUKOCYTE ESTERASE UR QL STRIP: NEGATIVE
LIPASE SERPL-CCNC: 60 U/L (ref 13–60)
LYMPHOCYTES # BLD AUTO: 2.1 10E3/UL (ref 0.8–5.3)
LYMPHOCYTES NFR BLD AUTO: 33 %
MAGNESIUM SERPL-MCNC: 2.1 MG/DL (ref 1.7–2.3)
MCH RBC QN AUTO: 29.5 PG (ref 26.5–33)
MCHC RBC AUTO-ENTMCNC: 32.8 G/DL (ref 31.5–36.5)
MCV RBC AUTO: 90 FL (ref 78–100)
MONOCYTES # BLD AUTO: 0.4 10E3/UL (ref 0–1.3)
MONOCYTES NFR BLD AUTO: 6 %
MUCOUS THREADS #/AREA URNS LPF: PRESENT /LPF
NEUTROPHILS # BLD AUTO: 3.6 10E3/UL (ref 1.6–8.3)
NEUTROPHILS NFR BLD AUTO: 57 %
NITRATE UR QL: NEGATIVE
NRBC # BLD AUTO: 0 10E3/UL
NRBC BLD AUTO-RTO: 0 /100
PH UR STRIP: 5 [PH] (ref 5–7)
PLATELET # BLD AUTO: 174 10E3/UL (ref 150–450)
POTASSIUM SERPL-SCNC: 4.6 MMOL/L (ref 3.4–5.3)
PROT SERPL-MCNC: 7.3 G/DL (ref 6.4–8.3)
RBC # BLD AUTO: 5.19 10E6/UL (ref 4.4–5.9)
RBC URINE: 0 /HPF
SODIUM SERPL-SCNC: 137 MMOL/L (ref 135–145)
SP GR UR STRIP: 1.02 (ref 1–1.03)
TROPONIN T SERPL HS-MCNC: 19 NG/L
TROPONIN T SERPL HS-MCNC: 21 NG/L
UROBILINOGEN UR STRIP-MCNC: NORMAL MG/DL
WBC # BLD AUTO: 6.3 10E3/UL (ref 4–11)
WBC URINE: 1 /HPF

## 2025-04-11 PROCEDURE — 250N000011 HC RX IP 250 OP 636: Performed by: EMERGENCY MEDICINE

## 2025-04-11 PROCEDURE — 83690 ASSAY OF LIPASE: CPT | Performed by: EMERGENCY MEDICINE

## 2025-04-11 PROCEDURE — 81001 URINALYSIS AUTO W/SCOPE: CPT | Performed by: EMERGENCY MEDICINE

## 2025-04-11 PROCEDURE — 82565 ASSAY OF CREATININE: CPT | Performed by: EMERGENCY MEDICINE

## 2025-04-11 PROCEDURE — 83735 ASSAY OF MAGNESIUM: CPT | Performed by: EMERGENCY MEDICINE

## 2025-04-11 PROCEDURE — 74177 CT ABD & PELVIS W/CONTRAST: CPT

## 2025-04-11 PROCEDURE — 84484 ASSAY OF TROPONIN QUANT: CPT | Performed by: EMERGENCY MEDICINE

## 2025-04-11 PROCEDURE — 96360 HYDRATION IV INFUSION INIT: CPT | Mod: 59

## 2025-04-11 PROCEDURE — 82947 ASSAY GLUCOSE BLOOD QUANT: CPT | Performed by: EMERGENCY MEDICINE

## 2025-04-11 PROCEDURE — 85379 FIBRIN DEGRADATION QUANT: CPT | Performed by: EMERGENCY MEDICINE

## 2025-04-11 PROCEDURE — 85004 AUTOMATED DIFF WBC COUNT: CPT | Performed by: EMERGENCY MEDICINE

## 2025-04-11 PROCEDURE — 36415 COLL VENOUS BLD VENIPUNCTURE: CPT | Performed by: EMERGENCY MEDICINE

## 2025-04-11 PROCEDURE — 93005 ELECTROCARDIOGRAM TRACING: CPT | Performed by: EMERGENCY MEDICINE

## 2025-04-11 PROCEDURE — 99285 EMERGENCY DEPT VISIT HI MDM: CPT | Mod: 25

## 2025-04-11 PROCEDURE — 258N000003 HC RX IP 258 OP 636: Performed by: EMERGENCY MEDICINE

## 2025-04-11 RX ORDER — MAGNESIUM HYDROXIDE/ALUMINUM HYDROXICE/SIMETHICONE 120; 1200; 1200 MG/30ML; MG/30ML; MG/30ML
15 SUSPENSION ORAL ONCE
Status: COMPLETED | OUTPATIENT
Start: 2025-04-11 | End: 2025-04-11

## 2025-04-11 RX ORDER — IOPAMIDOL 755 MG/ML
80 INJECTION, SOLUTION INTRAVASCULAR ONCE
Status: COMPLETED | OUTPATIENT
Start: 2025-04-11 | End: 2025-04-11

## 2025-04-11 RX ORDER — FAMOTIDINE 20 MG/1
20 TABLET, FILM COATED ORAL 2 TIMES DAILY PRN
Qty: 30 TABLET | Refills: 0 | Status: SHIPPED | OUTPATIENT
Start: 2025-04-11

## 2025-04-11 RX ORDER — ONDANSETRON 2 MG/ML
4 INJECTION INTRAMUSCULAR; INTRAVENOUS EVERY 30 MIN PRN
Status: DISCONTINUED | OUTPATIENT
Start: 2025-04-11 | End: 2025-04-11 | Stop reason: HOSPADM

## 2025-04-11 RX ADMIN — SODIUM CHLORIDE 500 ML: 9 INJECTION, SOLUTION INTRAVENOUS at 05:18

## 2025-04-11 RX ADMIN — IOPAMIDOL 80 ML: 755 INJECTION, SOLUTION INTRAVENOUS at 04:57

## 2025-04-11 ASSESSMENT — ENCOUNTER SYMPTOMS
ABDOMINAL PAIN: 1
VOMITING: 0
NAUSEA: 0
SHORTNESS OF BREATH: 0

## 2025-04-11 ASSESSMENT — COLUMBIA-SUICIDE SEVERITY RATING SCALE - C-SSRS
1. IN THE PAST MONTH, HAVE YOU WISHED YOU WERE DEAD OR WISHED YOU COULD GO TO SLEEP AND NOT WAKE UP?: NO
6. HAVE YOU EVER DONE ANYTHING, STARTED TO DO ANYTHING, OR PREPARED TO DO ANYTHING TO END YOUR LIFE?: NO
2. HAVE YOU ACTUALLY HAD ANY THOUGHTS OF KILLING YOURSELF IN THE PAST MONTH?: NO

## 2025-04-11 ASSESSMENT — ACTIVITIES OF DAILY LIVING (ADL)
ADLS_ACUITY_SCORE: 42

## 2025-04-11 NOTE — ED PROVIDER NOTES
NAME: Bright Lockett  AGE: 65 year old male  YOB: 1960  MRN: [id number]  EVALUATION DATE & TIME: No admission date for patient encounter.    PCP: Ladarius Soria    ED PROVIDER: Vadim Joseph M.D.      Chief Complaint   Patient presents with    Abdominal Pain         FINAL IMPRESSION:  1. Gastroesophageal reflux disease with esophagitis without hemorrhage    2. Cystic mass of pancreas    3. Abdominal pain, epigastric        MEDICAL DECISION MAKING:    3:17 AM I met with the patient, obtained history, performed an initial exam, and discussed options and plan for diagnostics and treatment here in the ED.   Patient was clinically assessed and consented to treatment. After assessment, medical decision making and workup were discussed with the patient. The patient was agreeable to plan for testing, workup, and treatment.  Pertinent Labs & Imaging studies reviewed. (See chart for details)  5:42 AM I updated the patient on their results. His pain improved. Plan to get a urine sample.         Medical Decision Making  Care impacted by Hyperlipidemia and Hypertension  Discharge. I prescribed additional prescription strength medication(s) as charted. See documentation for any additional details.    MIPS (CTPE, Dental pain, Lyles, Sinusitis, Asthma/COPD, Head Trauma): Not Applicable    SEPSIS: None        Bright Lockett is a 65 year old male who presents with abdominal pain.   Differential diagnosis includes but not limited to GERD, esophagitis, gastritis, acute coronary syndrome, pulmonary embolism, pneumonia, biliary colic.  Patient is a 65-year-old male with history of GERD.  Patient taking Prilosec but it was not helping and likely because he is only taking as needed needs to be taken more regularly.  Patient describes some pain starting on the right side and then moved towards the center.  Patient will be plan for workup for labs and imaging.  Initial labs showed stable hemoglobin, unremarkable  leukocytosis and electrolytes did show slight elevation creatinine patient was given IV fluids.  Troponin was equivocal we will plan to repeat it.  Otherwise unremarkable labs.  Patient did have D-dimer but was age negative.  Patient had no oxygenation problems and no tachycardia.  Patient sent for CT of the abdomen and showed no acute findings.  Diverticulosis though and a pancreatic nodule which we did discuss with follow-up recommendations.  Patient did have some bladder inflammation but urine was negative and repeat troponin unremarkable.  Patient relieved of symptoms after GI cocktail.  Following discussion with patient he did feel comfortable going home.  I will start him on Prilosec to be taken as needed every 12 hours for reflux disease rather than the Prilosec or in addition to the Prilosec if he needs to.  Patient comfortable with the plan and will be discharged home.    0 minutes of critical care time    MEDICATIONS GIVEN IN THE EMERGENCY:  Medications   ondansetron (ZOFRAN) injection 4 mg (has no administration in time range)   alum & mag hydroxide-simethicone (MAALOX) suspension 15 mL (15 mLs Oral Not Given 4/11/25 7548)   sodium chloride 0.9% BOLUS 500 mL (0 mLs Intravenous Stopped 4/11/25 3155)   iopamidol (ISOVUE-370) solution 80 mL (80 mLs Intravenous $Given 4/11/25 6873)       NEW PRESCRIPTIONS STARTED AT TODAY'S ER VISIT:  New Prescriptions    FAMOTIDINE (PEPCID) 20 MG TABLET    Take 1 tablet (20 mg) by mouth 2 times daily as needed (Heartburn).          =================================================================    HPI    Patient information was obtained from: The patient    Use of : N/A         Bright Lockett is a 65 year old male with a past medical history of HLD, HTN, who presents with abdominal pain.    The patient started having epigastric to right-sided abdominal pain 2 days ago. His pain tonight is more localized to the epigastrium and is hurting constantly. The pain  feels similar to his previous heart burn. He takes one dose of Prilosec for as needed and reports taking 6 doses since yesterday with mild relief initially. He has a history of small bowel obstructions.    No complaints of nausea, vomiting, diaphoresis, black or bloody stools, chest pain, shortness of breath, or any other associated symptoms.      REVIEW OF SYSTEMS   Review of Systems   Respiratory:  Negative for shortness of breath.    Cardiovascular:  Negative for chest pain.   Gastrointestinal:  Positive for abdominal pain (right-sided and epigastric pain). Negative for nausea and vomiting.        PAST MEDICAL HISTORY:  Past Medical History:   Diagnosis Date    Aortic sclerosis     Back pain     Benign neoplasm of adenomatous polyp     of the large intestine    Carpal tunnel syndrome     Cerebral palsy (H)     Diverticulosis     Esophageal reflux     Glaucoma     Hyperlipidemia     Hypertension     Left ventricular hypertrophy     Leg weakness     left    Neuritis     Osteoarthritis of knee        PAST SURGICAL HISTORY:  Past Surgical History:   Procedure Laterality Date    IMPLANT VALVE EYE Left 12/18/2023    Procedure: INSERTION, GLAUCOMA SHUNT IMPLANT, EYE - Left;  Surgeon: Flori Hamilton MD;  [facility]    KERATOPLASTY PENETRATING Left 11/11/2024    Procedure: LEFT TREPHINATION AND EXCISION SCARRED CORNEA, TEMPORARY KERATOPROSTHESIS, REMOVAL OF POSTERIOR CHAMBER INTRAOCULAR LENS, REMOVAL OF TEMPORARY PROSTHESIS AND PLACEMENT OF PENETRATING KERATOPROSTHESIS;  Surgeon: Kobi Archibald MD;  [facility]    PHACOEMULSIFICATION WITH STANDARD INTRAOCULAR LENS IMPLANT Left 12/18/2023    Procedure: LEFT - Phacoemulsificaiton  CATARACT,  WITH INTRAOCULAR LENS IMPLANT INSERTION, CTR, iridectomy;  Surgeon: Dre Layton MD;  [facility]    REPOSITION INTRAOCULAR LENS Left 4/29/2024    Procedure: LEFT INTRAOCULAR REPOSITIONING, ANTERIOR CHAMBER REFORMATION;  Surgeon: Madan Yepez  MD Dre;  [facility]    REVISE GLAUCOMA SHUNT Left 11/11/2024    Procedure: LEFT SHUNT REVISION WITH CORNEAL PATCH GRAFT;  Surgeon: Flori Hamilton MD;  [facility]    VITRECTOMY ANTERIOR Left 4/29/2024    Procedure: LEFT ANTERIOR VITRECTOMY;  Surgeon: Dre Layton MD;  [facility]    VITRECTOMY PARSPLANA WITH 25 GAUGE SYSTEM Left 11/11/2024    Procedure: LEFT VITRECTOMY, PARS PLANA APPROACH, USING 25-GAUGE INSTRUMENTS, ENDOLASER;  Surgeon: Dre Layton MD;  [facility]    ZZC EXPLORATORY OF ABDOMEN      Description: Exploratory Laparotomy;  Recorded: 09/25/2008;  Comments: small bowel obstruction       CURRENT MEDICATIONS:      Current Facility-Administered Medications:     ondansetron (ZOFRAN) injection 4 mg, 4 mg, Intravenous, Q30 Min PRN, Vadim Joseph MD    Current Outpatient Medications:     famotidine (PEPCID) 20 MG tablet, Take 1 tablet (20 mg) by mouth 2 times daily as needed (Heartburn)., Disp: 30 tablet, Rfl: 0    atorvastatin (LIPITOR) 20 MG tablet, Take 1 tablet (20 mg) by mouth at bedtime, Disp: 90 tablet, Rfl: 3    cloNIDine (CATAPRES) 0.1 MG tablet, TAKE 2 TABLETS(0.2 MG) BY MOUTH TWICE DAILY, Disp: 360 tablet, Rfl: 0    dorzolamide-timolol (COSOPT) 2-0.5 % ophthalmic solution, Place 1 drop Into the left eye 2 times daily., Disp: 10 mL, Rfl: 5    finasteride (PROSCAR) 5 MG tablet, TAKE ONE TABLET BY MOUTH ONCE DAILY, Disp: 90 tablet, Rfl: 2    furosemide (LASIX) 20 MG tablet, TAKE 1 TABLET(20 MG) BY MOUTH DAILY, Disp: 90 tablet, Rfl: 0    losartan (COZAAR) 100 MG tablet, TAKE 1 TABLET(100 MG) BY MOUTH DAILY, Disp: 90 tablet, Rfl: 3    metoprolol succinate ER (TOPROL XL) 100 MG 24 hr tablet, Take 1 tablet (100 mg) by mouth daily., Disp: 90 tablet, Rfl: 3    omeprazole (PRILOSEC) 20 MG DR capsule, Take 1 capsule (20 mg) by mouth daily as needed., Disp: 90 capsule, Rfl: 2    oxyBUTYnin (DITROPAN) 5 MG tablet, Take 1 tablet (5 mg) by mouth  at bedtime., Disp: 90 tablet, Rfl: 3    prednisoLONE acetate (PRED FORTE) 1 % ophthalmic suspension, Place 1-2 drops Into the left eye 4 times daily., Disp: 10 mL, Rfl: 2    prednisoLONE acetate (PRED FORTE) 1 % ophthalmic suspension, Apply 1 drop to eye 4 times daily., Disp: 5 mL, Rfl: 1    spironolactone (ALDACTONE) 25 MG tablet, Take 1 tablet (25 mg) by mouth daily, Disp: 90 tablet, Rfl: 3    tamsulosin (FLOMAX) 0.4 MG capsule, TAKE 1 CAPSULE BY MOUTH DAILY AFTER BREAKFAST, Disp: 90 capsule, Rfl: 3    ALLERGIES:  Allergies   Allergen Reactions    Morphine Nausea and Vomiting       FAMILY HISTORY:  Family History   Problem Relation Age of Onset    Glaucoma Mother     Alcoholism Mother     Dementia Mother     Arthritis Mother     Cancer Paternal Grandmother     Arthritis Sister     Arthritis Maternal Aunt     Arthritis Maternal Uncle     Arthritis Paternal Aunt     Arthritis Paternal Uncle     Macular Degeneration No family hx of        SOCIAL HISTORY:   Social History     Socioeconomic History    Marital status: Single   Tobacco Use    Smoking status: Never     Passive exposure: Never    Smokeless tobacco: Never   Vaping Use    Vaping status: Never Used   Substance and Sexual Activity    Alcohol use: No    Drug use: No     Social Drivers of Health     Financial Resource Strain: Low Risk  (5/28/2024)    Financial Resource Strain     Within the past 12 months, have you or your family members you live with been unable to get utilities (heat, electricity) when it was really needed?: No   Food Insecurity: Low Risk  (5/28/2024)    Food Insecurity     Within the past 12 months, did you worry that your food would run out before you got money to buy more?: No     Within the past 12 months, did the food you bought just not last and you didn t have money to get more?: No   Transportation Needs: High Risk (5/28/2024)    Transportation Needs     Within the past 12 months, has lack of transportation kept you from medical  appointments, getting your medicines, non-medical meetings or appointments, work, or from getting things that you need?: Yes   Physical Activity: Inactive (5/28/2024)    Exercise Vital Sign     Days of Exercise per Week: 0 days     Minutes of Exercise per Session: 0 min   Stress: No Stress Concern Present (5/28/2024)    [employer] [place] [employer] - Occupational Stress Questionnaire     Feeling of Stress : Not at all   Social Connections: Unknown (5/28/2024)    Social Connection and Isolation Panel [NHANES]     Frequency of Social Gatherings with Friends and Family: Once a week   Interpersonal Safety: Low Risk  (11/11/2024)    Interpersonal Safety     Do you feel physically and emotionally safe where you currently live?: Yes     Within the past 12 months, have you been hit, slapped, kicked or otherwise physically hurt by someone?: No     Within the past 12 months, have you been humiliated or emotionally abused in other ways by your partner or ex-partner?: No   Housing Stability: Low Risk  (5/28/2024)    Housing Stability     Do you have housing? : Yes     Are you worried about losing your housing?: No       PHYSICAL EXAM:    Vitals: /56   Pulse 51   Temp (!) 96.7  F (35.9  C) (Temporal)   Resp 30   Wt 65.8 kg (145 lb)   SpO2 98%   BMI 23.76 kg/m     Physical Exam  Vitals and nursing note reviewed.   Constitutional:       General: He is not in acute distress.     Appearance: He is well-developed and normal weight. He is not ill-appearing or toxic-appearing.   HENT:      Head: Normocephalic.   Cardiovascular:      Rate and Rhythm: Normal rate and regular rhythm.      Heart sounds: Normal heart sounds.   Pulmonary:      Effort: Pulmonary effort is normal. No respiratory distress.      Breath sounds: Normal breath sounds.   Chest:      Chest wall: No tenderness.   Abdominal:      General: Abdomen is flat.      Palpations: Abdomen is soft.      Tenderness: There is no abdominal tenderness. There  is no right CVA tenderness, left CVA tenderness, guarding or rebound.      Hernia: No hernia is present.   Skin:     General: Skin is warm and dry.      Coloration: Skin is not pale.   Neurological:      General: No focal deficit present.      Mental Status: He is alert.   Psychiatric:         Behavior: Behavior normal.           LAB:  All pertinent labs reviewed and interpreted.  Labs Ordered and Resulted from Time of ED Arrival to Time of ED Departure   COMPREHENSIVE METABOLIC PANEL - Abnormal       Result Value    Sodium 137      Potassium 4.6      Carbon Dioxide (CO2) 26      Anion Gap 7      Urea Nitrogen 34.6 (*)     Creatinine 1.69 (*)     GFR Estimate 44 (*)     Calcium 8.9      Chloride 104      Glucose 96      Alkaline Phosphatase 77      AST 18      ALT 9      Protein Total 7.3      Albumin 3.9      Bilirubin Total 1.0     D DIMER QUANTITATIVE - Abnormal    D-Dimer Quantitative 0.66 (*)    ROUTINE UA WITH MICROSCOPIC REFLEX TO CULTURE - Abnormal    Color Urine Light Yellow      Appearance Urine Clear      Glucose Urine Negative      Bilirubin Urine Negative      Ketones Urine Negative      Specific Gravity Urine 1.019      Blood Urine Negative      pH Urine 5.0      Protein Albumin Urine Negative      Urobilinogen Urine Normal      Nitrite Urine Negative      Leukocyte Esterase Urine Negative      Bacteria Urine None Seen      Mucus Urine Present (*)     RBC Urine 0      WBC Urine 1      Hyaline Casts Urine 4 (*)    LIPASE - Normal    Lipase 60     TROPONIN T, HIGH SENSITIVITY - Normal    Troponin T, High Sensitivity 21     MAGNESIUM - Normal    Magnesium 2.1     TROPONIN T, HIGH SENSITIVITY - Normal    Troponin T, High Sensitivity 19     CBC WITH PLATELETS AND DIFFERENTIAL    WBC Count 6.3      RBC Count 5.19      Hemoglobin 15.3      Hematocrit 46.6      MCV 90      MCH 29.5      MCHC 32.8      RDW 11.9      Platelet Count 174      % Neutrophils 57      % Lymphocytes 33      % Monocytes 6      %  Eosinophils 2      % Basophils 1      % Immature Granulocytes 0      NRBCs per 100 WBC 0      Absolute Neutrophils 3.6      Absolute Lymphocytes 2.1      Absolute Monocytes 0.4      Absolute Eosinophils 0.2      Absolute Basophils 0.1      Absolute Immature Granulocytes 0.0      Absolute NRBCs 0.0         RADIOLOGY:  CT Abdomen Pelvis w Contrast   Final Result   IMPRESSION:    1.  No inflammatory change, bowel obstruction or abscess.   2.  Diverticulosis.   3.  Bladder wall trabeculation. Mild bladder wall thickening/cystitis not excluded.   4.  8 mm cystic lesion pancreatic body. Follow-up according guidelines below.   5.  Pulmonary arteries are enlarged. Correlate for pulmonary hypertension.      REFERENCE:   Revisions of international consensus [place] guidelines for the management of IPMN of the pancreas. Pancreatology 2017;17(5):738-753.      Largest cyst less than 10 mm: CT or MRI/MRCP in 6 months and then every 2 years if no change.   1.               EKG:   Performed at: 04/11/2025 at 03:37  Impression: Sinus bradycardia, initial artifact and PVC but infrequent, multiple T wave inversions however consistent with previous EKG from November last year and no acute changes compared to previous EKGs.  No acute ST elevation.  Rate: 46 bpm  Rhythm: Sinus bradycardia with premature supraventricular complexes and fusion complexes  QRS Interval: 80 ms  QTc Interval: 428 ms  Comparison: Compared with ECG of 11/07/2024    I have independently reviewed and interpreted the EKG(s) documented above.     PROCEDURES:   Procedures       I, Daniel Roberto, am serving as a scribe to document services personally performed by Dr. Vadim Joseph  based on my observation and the provider's statements to me. I, Vadim Joseph MD attest that Daniel Roberto is acting in a scribe capacity, has observed my performance of the services and has documented them in accordance with my direction.      Vadim Joseph M.D.  Emergency Medicine  [facility]  [facility] Emergency Department       Vadim Joseph MD  04/11/25 0893

## 2025-04-11 NOTE — ED TRIAGE NOTES
Pt states having epigastric pain 1/10.  Pt has been taking prilosec multiple times throughout the past couple days with little relief.  Pt denies shortness of breath.

## 2025-04-12 LAB
ATRIAL RATE - MUSE: 46 BPM
DIASTOLIC BLOOD PRESSURE - MUSE: 58 MMHG
INTERPRETATION ECG - MUSE: NORMAL
P AXIS - MUSE: 79 DEGREES
PR INTERVAL - MUSE: 144 MS
QRS DURATION - MUSE: 80 MS
QT - MUSE: 490 MS
QTC - MUSE: 428 MS
R AXIS - MUSE: 86 DEGREES
SYSTOLIC BLOOD PRESSURE - MUSE: 118 MMHG
T AXIS - MUSE: 188 DEGREES
VENTRICULAR RATE- MUSE: 46 BPM

## 2025-05-07 ENCOUNTER — OFFICE VISIT (OUTPATIENT)
Dept: OPHTHALMOLOGY | Facility: CLINIC | Age: 65
End: 2025-05-07
Attending: OPHTHALMOLOGY
Payer: MEDICARE

## 2025-05-07 DIAGNOSIS — Z94.7 PENETRATING KERATOPLASTY GRAFT IN PLACE: Primary | ICD-10-CM

## 2025-05-07 PROCEDURE — G0463 HOSPITAL OUTPT CLINIC VISIT: HCPCS | Performed by: OPHTHALMOLOGY

## 2025-05-07 ASSESSMENT — CONF VISUAL FIELD
OD_INFERIOR_TEMPORAL_RESTRICTION: 1
OS_INFERIOR_NASAL_RESTRICTION: 1
OS_SUPERIOR_TEMPORAL_RESTRICTION: 1
OD_SUPERIOR_NASAL_RESTRICTION: 1
OD_SUPERIOR_TEMPORAL_RESTRICTION: 1
OS_INFERIOR_TEMPORAL_RESTRICTION: 1
OD_INFERIOR_NASAL_RESTRICTION: 1
OS_SUPERIOR_NASAL_RESTRICTION: 1

## 2025-05-07 ASSESSMENT — TONOMETRY
IOP_METHOD: ICARE
OS_IOP_MMHG: 8
OD_IOP_MMHG: PROS

## 2025-05-07 ASSESSMENT — VISUAL ACUITY
METHOD: SNELLEN - LINEAR
OS_SC: NLP

## 2025-05-07 NOTE — PATIENT INSTRUCTIONS
Prednisolone 2 times per day     Preservative free artificial tears every 3 hours left eye    Brimonidine and cosopt two times per day left eye

## 2025-05-07 NOTE — PROGRESS NOTES
Chief complaint   s/p PKP OS    HPI    Bright Lockett 65 year old male presents for evaluation      Interval hx 05/07/2025  Chief Complaint(s) and History of Present Illness(es)    Interval hx 05/07/2025  Chief Complaint(s) and History of Present Illness(es)       Follow Up    In both eyes.  Associated symptoms include eye pain (mild).  Treatments tried include eye drops.  Pain was noted as 2/10. Additional comments: right eye prosthesis, left eye Reiger's anomaly               Comments    Patient tells me that he has little to no vision.  He has some irritation in his left eye.    He uses Prednisolone drops in his left eye every time he uses the bathroom and a house mate assists him to use his other drop (Cosopt)  twice a day.    Simi Tomlinson, COT 1:15 PM  May 7, 2025           Past ocular history   Prior eye surgery/laser/Trauma:   S/P left eye CE IOL - zonulysis was present, CTR was placed (Madan) + Glaucoma ahmed tube shunt (Alfonso) 12/18/23   S/p left eye AC washout and vitrectomy 4/29/24.       PMH     Past Medical History:   Diagnosis Date    Aortic sclerosis     Back pain     Benign neoplasm of adenomatous polyp     of the large intestine    Carpal tunnel syndrome     Cerebral palsy (H)     Diverticulosis     Esophageal reflux     Glaucoma     Hyperlipidemia     Hypertension     Left ventricular hypertrophy     Leg weakness     left    Neuritis     Osteoarthritis of knee        PSH     Past Surgical History:   Procedure Laterality Date    IMPLANT VALVE EYE Left 12/18/2023    Procedure: INSERTION, GLAUCOMA SHUNT IMPLANT, EYE - Left;  Surgeon: Flori Hamilton MD;  [facility]    KERATOPLASTY PENETRATING Left 11/11/2024    Procedure: LEFT TREPHINATION AND EXCISION SCARRED CORNEA, TEMPORARY KERATOPROSTHESIS, REMOVAL OF POSTERIOR CHAMBER INTRAOCULAR LENS, REMOVAL OF TEMPORARY PROSTHESIS AND PLACEMENT OF PENETRATING KERATOPROSTHESIS;  Surgeon: Kobi Archibald MD;  [facility]    PHACOEMULSIFICATION  WITH STANDARD INTRAOCULAR LENS IMPLANT Left 12/18/2023    Procedure: LEFT - Phacoemulsificaiton  CATARACT,  WITH INTRAOCULAR LENS IMPLANT INSERTION, CTR, iridectomy;  Surgeon: Dre Layton MD;  [facility]    REPOSITION INTRAOCULAR LENS Left 4/29/2024    Procedure: LEFT INTRAOCULAR REPOSITIONING, ANTERIOR CHAMBER REFORMATION;  Surgeon: Dre Layton MD;  [facility]    REVISE GLAUCOMA SHUNT Left 11/11/2024    Procedure: LEFT SHUNT REVISION WITH CORNEAL PATCH GRAFT;  Surgeon: Flori Hamilton MD;  [facility]    VITRECTOMY ANTERIOR Left 4/29/2024    Procedure: LEFT ANTERIOR VITRECTOMY;  Surgeon: Dre Layton MD;  [facility]    VITRECTOMY PARSPLANA WITH 25 GAUGE SYSTEM Left 11/11/2024    Procedure: LEFT VITRECTOMY, PARS PLANA APPROACH, USING 25-GAUGE INSTRUMENTS, ENDOLASER;  Surgeon: Dre Layton MD;  [facility]    ZZC EXPLORATORY OF ABDOMEN      Description: Exploratory Laparotomy;  Recorded: 09/25/2008;  Comments: small bowel obstruction       Meds     Current Outpatient Medications   Medication Sig Dispense Refill    atorvastatin (LIPITOR) 20 MG tablet Take 1 tablet (20 mg) by mouth at bedtime 90 tablet 3    cloNIDine (CATAPRES) 0.1 MG tablet TAKE 2 TABLETS(0.2 MG) BY MOUTH TWICE DAILY 360 tablet 0    dorzolamide-timolol (COSOPT) 2-0.5 % ophthalmic solution Place 1 drop Into the left eye 2 times daily. 10 mL 5    famotidine (PEPCID) 20 MG tablet Take 1 tablet (20 mg) by mouth 2 times daily as needed (Heartburn). 30 tablet 0    finasteride (PROSCAR) 5 MG tablet TAKE ONE TABLET BY MOUTH ONCE DAILY 90 tablet 2    furosemide (LASIX) 20 MG tablet TAKE 1 TABLET(20 MG) BY MOUTH DAILY 90 tablet 0    losartan (COZAAR) 100 MG tablet TAKE 1 TABLET(100 MG) BY MOUTH DAILY 90 tablet 3    metoprolol succinate ER (TOPROL XL) 100 MG 24 hr tablet Take 1 tablet (100 mg) by mouth daily. 90 tablet 3    omeprazole (PRILOSEC) 20 MG DR capsule Take 1 capsule (20  "mg) by mouth daily as needed. 90 capsule 2    oxyBUTYnin (DITROPAN) 5 MG tablet Take 1 tablet (5 mg) by mouth at bedtime. 90 tablet 3    prednisoLONE acetate (PRED FORTE) 1 % ophthalmic suspension Place 1-2 drops Into the left eye 4 times daily. 10 mL 2    tamsulosin (FLOMAX) 0.4 MG capsule TAKE 1 CAPSULE BY MOUTH DAILY AFTER BREAKFAST 90 capsule 3    prednisoLONE acetate (PRED FORTE) 1 % ophthalmic suspension Apply 1 drop to eye 4 times daily. 5 mL 1    spironolactone (ALDACTONE) 25 MG tablet Take 1 tablet (25 mg) by mouth daily (Patient not taking: Reported on 5/7/2025) 90 tablet 3     No current facility-administered medications for this visit.       Drops Currently Taking  5/7/2025   Cosopt ? left eye   Brimonidine ? left eye  Prednisolone and Ofloxacin  ? 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  S/p cataract sx in January   S/p PKP/IOL removal/tube reposition/vitrectomy left eye (Dr. Hamilton, Dr. Archibald, and Dr. 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 IOL was shifted anteriorly, Tube was at the cornea and extending paracentrally  6/26/24: cornea is more opaque centrally  11/22/24: POW1 s/p PKP/IOL removal/tube reposition/vitrectomy left eye (Dr. Hamilton, Dr. Archibald, and Dr. Hager). Patient doing well. [facility] ?LP. IOP 6.   12/13/24: POM1 s/p PKP/IOL removal/tube reposition/vitrectomy left eye (Dr. Hamilton, Dr. Archibald, and Dr. Hager). Patient doing well. [facility] ?LP; needs more PFAT left eye; Memo -ve, suture all intact  1/17/25 clear graft with few D folds, no loose sutures  5/7/25: POM6 s/p PKP/IOL removal/tube reposition/vitrectomy left eye (Dr. Hamilton, Dr. Archibald, and Dr." Madan).  Patient does not know how many times he is taking which eye drops; good post-op appearance: clear cornea, AC D/Q, sutures intact, Memo -ve      Plan  5/7/2025  Prednisolone TID OS  Brimonidine and cosopt BID left eye  F/up 2 months and to remove 4 sutures next visit    Radha Monique MD  Cornea and External Disease Fellow  [facility]    Attending Physician Attestation:  Complete documentation of historical and exam elements from today's encounter can be found in the full encounter summary report (not reduplicated in this progress note).  I personally obtained the chief complaint(s) and history of present illness.  I confirmed and edited as necessary the review of systems, past medical/surgical history, family history, social history, and examination findings as documented by others; and I examined the patient myself.  I personally reviewed the relevant tests, images, and reports as documented above.  I formulated and edited as necessary the assessment and plan and discussed the findings and management plan with the patient and family. - Kobi Archibald MD

## 2025-05-07 NOTE — NURSING NOTE
Chief Complaints and History of Present Illnesses   Patient presents with    Follow Up     right eye prosthesis, left eye Reiger's anomaly       Chief Complaint(s) and History of Present Illness(es)       Follow Up              Laterality: both eyes    Associated symptoms: eye pain (mild)    Treatments tried: eye drops    Pain scale: 2/10    Comments: right eye prosthesis, left eye Reiger's anomaly                Comments    Patient tells me that he has little to no vision.  He has some irritation in his left eye.    He uses Prednisolone drops in his left eye every time he uses the bathroom and a house mate assists him to use his other drop (Cosopt)  twice a day.    Simi Tomlinson, COT 1:15 PM  May 7, 2025

## 2025-05-13 ENCOUNTER — OFFICE VISIT (OUTPATIENT)
Dept: OPHTHALMOLOGY | Facility: CLINIC | Age: 65
End: 2025-05-13
Attending: OPHTHALMOLOGY
Payer: MEDICARE

## 2025-05-13 DIAGNOSIS — Z94.7 PENETRATING KERATOPLASTY GRAFT IN PLACE: Primary | ICD-10-CM

## 2025-05-13 DIAGNOSIS — Q13.81 AXENFELD-RIEGER SYNDROME: ICD-10-CM

## 2025-05-13 DIAGNOSIS — H40.52X3 GLAUCOMA ASSOCIATED WITH OCULAR DISORDER, LEFT, SEVERE STAGE: ICD-10-CM

## 2025-05-13 DIAGNOSIS — Q13.81: ICD-10-CM

## 2025-05-13 PROCEDURE — G0463 HOSPITAL OUTPT CLINIC VISIT: HCPCS | Performed by: OPHTHALMOLOGY

## 2025-05-13 ASSESSMENT — TONOMETRY
OS_IOP_MMHG: 9
OD_IOP_MMHG: PROSTHETIC
IOP_METHOD: TONOPEN
IOP_METHOD: TONOPEN
OS_IOP_MMHG: 8

## 2025-05-13 ASSESSMENT — CONF VISUAL FIELD
OD_SUPERIOR_NASAL_RESTRICTION: 0
OS_INFERIOR_NASAL_RESTRICTION: 1
OS_SUPERIOR_NASAL_RESTRICTION: 1
OS_SUPERIOR_TEMPORAL_RESTRICTION: 1
OD_SUPERIOR_TEMPORAL_RESTRICTION: 0
OD_INFERIOR_TEMPORAL_RESTRICTION: 0
METHOD: COUNTING FINGERS
OS_INFERIOR_TEMPORAL_RESTRICTION: 1

## 2025-05-13 ASSESSMENT — SLIT LAMP EXAM - LIDS: COMMENTS: NORMAL

## 2025-05-13 ASSESSMENT — VISUAL ACUITY
OD_SC: PROSTHETIC
METHOD: TUMBLING E 'S

## 2025-05-13 ASSESSMENT — CUP TO DISC RATIO: OS_RATIO: 0.7

## 2025-05-13 NOTE — PROGRESS NOTES
Chief Complaint/Presenting Concern: Glaucoma     History of Present Illness:   Bright Lockett is a 63 year old patient who presents for post op. Per chart note from [facility] on 07/07/23, he previously was followed by Dr. Ritter, but had not been on drops for years at that time. At that visit, his eye pressure was in the 30s. He has a history of glaucoma, Reiger's anomaly, and a cataract in the left eye.     Today: s/p PKP/IOL removal/tube reposition/vitrectomy left eye 11/11/24 (Dr. Hamilton, Dr. Archibald, and Dr. Hager). Vision remains low.      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 Dillon); Per patient, has had at least two surgeries in the left eye  CEIOL/Ahmed 12/18/23  Maximum intraocular pressure x/35 mmHg  Currently Meds: Cosopt BID, Brimonidine TID, Prednisolone QID, Ofloxacin QID left eye   Family history: positive: Possibly mother  CCT (um): 11/14/2023: x/555  Gonio: 11/14/2023:   Trauma history: negative  Steroid exposure: negative  Vasospastic disease: Migrane/Raynaud phenomenon: positive, Migraines  A past hemodynamic crisis or Low BP:: positive: History of GI surgery to remove blockage, patient has to be resuscitated on table  Meds AEs/intolerance: None  PMHx: Heart failure, Chronic  kidney disease  Anticoagulants: None  Previous testing:  Visual field November 14, 2023: Unable to obtain   OCT Optic Nerve RNFL Spectralis November 14, 2023  right eye: X  left eye: Unable to obtain adequate image    Today's testing IOP 9 left eye     Additional Ocular History:   Enucleation, right eye (~1975)    Kisha's anomaly, left eye    Pseudophakia, left eye    CRVO left eye saw Madan who did not recommend JORDAN 12/11/24 for CRVO left eye     Plan/Recommendations:  Discussed findings with patient.  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   Underwent CEIOL/JOYCE on 12/18/23 with post-op hyphema. Inferior haptic appeared displaced into the AC.    Today 05/13/2025:  s/p PKP/IOL removal/tube reposition/vitrectomy left eye (Dr. Hamilton, Dr. Archibald, and Dr. Hager). IOP down today on drops, tube patent.   Continue Cosopt BID left eye and okay to drop to once daily once cornea team drops the prednisolone to once daily   Postop precautions and instructions given to patient.     RTC in 5 months [facility], IOP       Physician Attestation     Attending Physician Attestation:  Complete documentation of historical and exam elements from today's encounter can be found in the full encounter summary report (not reduplicated in this progress note). I personally obtained the chief complaint(s) and history of present illness. I confirmed and edited as necessary the review of systems, past medical/surgical history, family history, social history, and examination findings as documented by others; and I examined the patient myself. I personally reviewed the relevant tests, images, and reports as documented above. I formulated and edited as necessary the assessment and plan and discussed the findings and management plan with the patient and any family members present at the time of the visit.  Flori Hamilton M.D., Glaucoma, May 13, 2025

## 2025-05-13 NOTE — PATIENT INSTRUCTIONS
Prednisolone 2 times per day     Preservative free artificial tears as needed     Cosopt two times per day left eye

## 2025-05-13 NOTE — NURSING NOTE
Chief Complaints and History of Present Illnesses   Patient presents with    Glaucoma Follow Up     Prosthetic right eye     Chief Complaint(s) and History of Present Illness(es)       Glaucoma Follow Up              Laterality: left eye    Comments: Prosthetic right eye              Comments    Patient is severely hard of hearing. Patient is seeing about the same. No ocular pain. No headache or nausea. Taking eye drops. Is appreciative for the change of weather but concerned about not seeing well.    Leia Bean on 5/13/2025 at 1:51 PM

## 2025-05-23 DIAGNOSIS — I10 ESSENTIAL HYPERTENSION: ICD-10-CM

## 2025-05-23 DIAGNOSIS — E87.6 HYPOKALEMIA: ICD-10-CM

## 2025-05-27 RX ORDER — CLONIDINE HYDROCHLORIDE 0.1 MG/1
TABLET ORAL
Qty: 360 TABLET | Refills: 3 | Status: SHIPPED | OUTPATIENT
Start: 2025-05-27

## 2025-05-27 RX ORDER — SPIRONOLACTONE 25 MG/1
25 TABLET ORAL DAILY
Qty: 90 TABLET | Refills: 3 | Status: SHIPPED | OUTPATIENT
Start: 2025-05-27

## 2025-06-02 ENCOUNTER — PATIENT OUTREACH (OUTPATIENT)
Dept: GASTROENTEROLOGY | Facility: CLINIC | Age: 65
End: 2025-06-02
Payer: MEDICARE

## 2025-06-02 DIAGNOSIS — Z12.11 SPECIAL SCREENING FOR MALIGNANT NEOPLASMS, COLON: Primary | ICD-10-CM

## 2025-06-02 NOTE — PROGRESS NOTES
"CRC Screening Colonoscopy Referral Review    Patient meets the inclusion criteria for screening colonoscopy standing order.    Ordering/Referring Provider:  Ladarius Soria      BMI: Estimated body mass index is 23.76 kg/m  as calculated from the following:    Height as of 11/11/24: 1.664 m (5' 5.5\").    Weight as of 4/11/25: 65.8 kg (145 lb).     Sedation:  Does patient have any of the following conditions affecting sedation?  No medical conditions affecting sedation. PULM HTN    Previous Scopes:  Any previous recommendations or follow up needs based on previous scope?  na / No recommendations.    Medical Concerns to Postpone Order:  Does patient have any of the following medical concerns that should postpone/delay colonoscopy referral?  No medical conditions affecting colonoscopy referral.    Final Referral Details:  Based on patient's medical history patient is appropriate for referral order with MAC/deep sedation.   BMI<= 45 45 < BMI <= 48 48 < BMI < = 50  BMI > 50   No Restrictions No [facility]  No [facility]  [facility] with exceptions Hospital Only OR Only     "

## 2025-06-13 ENCOUNTER — MYC MEDICAL ADVICE (OUTPATIENT)
Dept: FAMILY MEDICINE | Facility: CLINIC | Age: 65
End: 2025-06-13
Payer: MEDICARE

## 2025-06-13 DIAGNOSIS — I10 ESSENTIAL HYPERTENSION: ICD-10-CM

## 2025-06-13 DIAGNOSIS — R60.9 EDEMA, UNSPECIFIED TYPE: ICD-10-CM

## 2025-06-16 RX ORDER — FUROSEMIDE 20 MG/1
20 TABLET ORAL DAILY
Qty: 90 TABLET | Refills: 0 | Status: SHIPPED | OUTPATIENT
Start: 2025-06-16

## 2025-06-16 NOTE — TELEPHONE ENCOUNTER
Medication Refill    What medication are you calling about (include dose and sig)?: furosemide (LASIX) 20 MG tablet     Preferred Pharmacy:  [id number] - [place], [place] - [address] [place] [address]  Phone: [phone] Fax: [phone]    Who prescribed the medication?: Indigo Rivera, BINDU     Do you need a refill? Yes    Do you have any questions or concerns?  Yes: Dr. Yang Hylton.     I need my furosimide prescription renewed and I'm completely out.  Could you please send it to the [facility].?  Thank you!    Could we send this information to you in K[employer]io or would you prefer to receive a phone call?:   Patient would like to be contacted via Kovio     Routed to Dr. Soria to review.  Pended.    Manuel HUTCHINS RN  [facility]

## 2025-07-16 ENCOUNTER — OFFICE VISIT (OUTPATIENT)
Dept: OPHTHALMOLOGY | Facility: CLINIC | Age: 65
End: 2025-07-16
Attending: OPHTHALMOLOGY
Payer: MEDICARE

## 2025-07-16 DIAGNOSIS — Z94.7 PENETRATING KERATOPLASTY GRAFT IN PLACE: Primary | ICD-10-CM

## 2025-07-16 PROCEDURE — G0463 HOSPITAL OUTPT CLINIC VISIT: HCPCS | Performed by: OPHTHALMOLOGY

## 2025-07-16 ASSESSMENT — VISUAL ACUITY
METHOD: SNELLEN - LINEAR
OD_SC: PROSTHETIC

## 2025-07-16 ASSESSMENT — SLIT LAMP EXAM - LIDS: COMMENTS: NORMAL

## 2025-07-16 ASSESSMENT — TONOMETRY
IOP_METHOD: ICARE
OD_IOP_MMHG: PROSTHESIS
OS_IOP_MMHG: 12

## 2025-07-16 ASSESSMENT — PACHYMETRY: OS_CT(UM): 555

## 2025-07-16 NOTE — PROGRESS NOTES
Chief complaint   s/p PKP OS    HPI    Bright Lockett 65 year old male presents for evaluation      Interval hx 07/16/2025  Chief Complaint(s) and History of Present Illness(es)       PK graft in place os                Comments    Vision stable os   Irritation os 1 to 1.5/10 - wondering if sutures causing this.     Patient reports has new LED light at home that is able to see if on or off  (just got it 1 mth ago).   Denies pain.   Gtts:   Cosopt  bid left eye   Prednisolone bid  left eye   Brimonidine bid os     Refresh Tears os tid     Sister and power-of-, Leeanne, present today.     Simi KAPLAN, July 16, 2025 1:07 PM                           Past ocular history   Prior eye surgery/laser/Trauma:   S/P left eye CE IOL - zonulysis was present, CTR was placed (Madan) + Glaucoma ahmed tube shunt (Alfonso) 12/18/23   S/p left eye AC washout and vitrectomy 4/29/24.       PMH     Past Medical History:   Diagnosis Date    Aortic sclerosis     Back pain     Benign neoplasm of adenomatous polyp     of the large intestine    Carpal tunnel syndrome     Cerebral palsy (H)     Diverticulosis     Esophageal reflux     Glaucoma     Hyperlipidemia     Hypertension     Left ventricular hypertrophy     Leg weakness     left    Neuritis     Osteoarthritis of knee        PSH     Past Surgical History:   Procedure Laterality Date    IMPLANT VALVE EYE Left 12/18/2023    Procedure: INSERTION, GLAUCOMA SHUNT IMPLANT, EYE - Left;  Surgeon: Flori Hamilton MD;  Location: [facility] OR    KERATOPLASTY PENETRATING Left 11/11/2024    Procedure: LEFT TREPHINATION AND EXCISION SCARRED CORNEA, TEMPORARY KERATOPROSTHESIS, REMOVAL OF POSTERIOR CHAMBER INTRAOCULAR LENS, REMOVAL OF TEMPORARY PROSTHESIS AND PLACEMENT OF PENETRATING KERATOPROSTHESIS;  Surgeon: Kobi Archibald MD;  Location: [facility] OR    PHACOEMULSIFICATION WITH STANDARD INTRAOCULAR LENS IMPLANT Left 12/18/2023    Procedure: LEFT - Phacoemulsificaiton  CATARACT,  WITH INTRAOCULAR  LENS IMPLANT INSERTION, CTR, iridectomy;  Surgeon: Dre Layton MD;  Location: [facility] OR    REPOSITION INTRAOCULAR LENS Left 4/29/2024    Procedure: LEFT INTRAOCULAR REPOSITIONING, ANTERIOR CHAMBER REFORMATION;  Surgeon: Dre Layton MD;  [facility]: [facility] OR    REVISE GLAUCOMA SHUNT Left 11/11/2024    Procedure: LEFT SHUNT REVISION WITH CORNEAL PATCH GRAFT;  Surgeon: Flori Hamilton MD;  [facility] OR    VITRECTOMY ANTERIOR Left 4/29/2024    Procedure: LEFT ANTERIOR VITRECTOMY;  Surgeon: Dre Layton MD;  [facility] OR    VITRECTOMY PARSPLANA WITH 25 GAUGE SYSTEM Left 11/11/2024    Procedure: LEFT VITRECTOMY, PARS PLANA APPROACH, USING 25-GAUGE INSTRUMENTS, ENDOLASER;  Surgeon: Dre Layton MD;  Location: [facility] OR    ZZC EXPLORATORY OF ABDOMEN      Description: Exploratory Laparotomy;  Recorded: 09/25/2008;  Comments: small bowel obstruction       Meds     Current Outpatient Medications   Medication Sig Dispense Refill    atorvastatin (LIPITOR) 20 MG tablet TAKE 1 TABLET(20 MG) BY MOUTH AT BEDTIME 90 tablet 1    Carboxymethylcellul-Glycerin (REFRESH OPTIVE OP) Apply to eye. Taking one drop as needed left eye      cloNIDine (CATAPRES) 0.1 MG tablet TAKE 2 TABLETS(0.2 MG) BY MOUTH TWICE DAILY 360 tablet 3    dorzolamide-timolol (COSOPT) 2-0.5 % ophthalmic solution Place 1 drop Into the left eye 2 times daily. 10 mL 5    famotidine (PEPCID) 20 MG tablet Take 1 tablet (20 mg) by mouth 2 times daily as needed (Heartburn). 30 tablet 0    finasteride (PROSCAR) 5 MG tablet TAKE ONE TABLET BY MOUTH ONCE DAILY 90 tablet 2    furosemide (LASIX) 20 MG tablet Take 1 tablet (20 mg) by mouth daily. 90 tablet 0    losartan (COZAAR) 100 MG tablet TAKE 1 TABLET(100 MG) BY MOUTH DAILY 90 tablet 3    metoprolol succinate ER (TOPROL XL) 100 MG 24 hr tablet Take 1 tablet (100 mg) by mouth daily. 90 tablet 3    omeprazole (PRILOSEC) 20 MG DR capsule Take 1 capsule (20 mg) by mouth daily as  "needed. 90 capsule 2    oxyBUTYnin (DITROPAN) 5 MG tablet Take 1 tablet (5 mg) by mouth at bedtime. 90 tablet 3    prednisoLONE acetate (PRED FORTE) 1 % ophthalmic suspension Place 1-2 drops Into the left eye 4 times daily. (Patient taking differently: Place 1-2 drops Into the left eye 4 times daily. Taking twice daily left eye) 10 mL 2    prednisoLONE acetate (PRED FORTE) 1 % ophthalmic suspension Apply 1 drop to eye 4 times daily. 5 mL 1    spironolactone (ALDACTONE) 25 MG tablet TAKE 1 TABLET(25 MG) BY MOUTH DAILY 90 tablet 3    tamsulosin (FLOMAX) 0.4 MG capsule TAKE 1 CAPSULE BY MOUTH DAILY AFTER BREAKFAST 90 capsule 3     No current facility-administered medications for this visit.       Drops Currently Taking  5/7/2025   Cosopt ? left eye   Brimonidine ? left eye  Prednisolone and Ofloxacin  ? \"unknown\" number of drops per day left eye     Assessment/Plan 07/16/2025   # right eye prosthesis   # left eye Reiger's anomaly  # advanced stage secondary glaucoma (in the setting of reiger's anomaly) left eye   #pseudophakia   S/p left eye AC washout and vitrectomy 4/29/24.  S/p cataract sx in January   S/p PKP/IOL removal/tube reposition/vitrectomy left eye (Dr. Hamilton, Dr. Archibald, and Dr. 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 IOL was shifted anteriorly, Tube was at the cornea and extending paracentrally  6/26/24: cornea is more opaque centrally  11/22/24: POW1 s/p PKP/IOL removal/tube reposition/vitrectomy left eye (Dr. Hamilton, Dr. Archibald, and Dr. Hager). Patient doing well. [facility] ?LP. IOP 6.   12/13/24: POM1 s/p PKP/IOL removal/tube reposition/vitrectomy left eye (Dr. Hamilton, Dr. Archibald, and Dr. Hager). Patient doing well. [facility] ?LP; needs more PFAT left eye; Memo -ve, suture all intact  1/17/25 clear graft with few D folds, no loose sutures  5/7/25: POM6 s/p PKP/IOL removal/tube reposition/vitrectomy left eye ( " Alfonso, Dr. Archibald, and Dr. Hager).  Patient does not know how many times he is taking which eye drops; good post-op appearance: clear cornea, AC D/Q, sutures intact, Memo -ve  7/16/25 clear cornea sutures in place     Plan    Prednisolone TID OS  Brimonidine and cosopt BID left eye  F/up 2 months and to remove 8 sutures next time    Radha Monique MD  Cornea and External Disease Fellow  [facility]    Attending Physician Attestation:  Complete documentation of historical and exam elements from today's encounter can be found in the full encounter summary report (not reduplicated in this progress note).  I personally obtained the chief complaint(s) and history of present illness.  I confirmed and edited as necessary the review of systems, past medical/surgical history, family history, social history, and examination findings as documented by others; and I examined the patient myself.  I personally reviewed the relevant tests, images, and reports as documented above.  I formulated and edited as necessary the assessment and plan and discussed the findings and management plan with the patient and family. - Kobi Archibald MD

## 2025-08-06 ENCOUNTER — OFFICE VISIT (OUTPATIENT)
Dept: FAMILY MEDICINE | Facility: CLINIC | Age: 65
End: 2025-08-06
Payer: MEDICARE

## 2025-08-06 ENCOUNTER — ORDERS ONLY (AUTO-RELEASED) (OUTPATIENT)
Dept: FAMILY MEDICINE | Facility: CLINIC | Age: 65
End: 2025-08-06

## 2025-08-06 VITALS
DIASTOLIC BLOOD PRESSURE: 52 MMHG | BODY MASS INDEX: 21.47 KG/M2 | OXYGEN SATURATION: 96 % | HEART RATE: 65 BPM | WEIGHT: 131 LBS | TEMPERATURE: 97.3 F | RESPIRATION RATE: 16 BRPM | SYSTOLIC BLOOD PRESSURE: 94 MMHG

## 2025-08-06 DIAGNOSIS — Z12.11 COLON CANCER SCREENING: ICD-10-CM

## 2025-08-06 DIAGNOSIS — D12.6 BENIGN NEOPLASM OF COLON, UNSPECIFIED PART OF COLON: ICD-10-CM

## 2025-08-06 DIAGNOSIS — Z11.4 ENCOUNTER FOR SCREENING FOR HUMAN IMMUNODEFICIENCY VIRUS (HIV): ICD-10-CM

## 2025-08-06 DIAGNOSIS — G80.9 INFANTILE CEREBRAL PALSY (H): Chronic | ICD-10-CM

## 2025-08-06 DIAGNOSIS — R73.9 HYPERGLYCEMIA: ICD-10-CM

## 2025-08-06 DIAGNOSIS — I13.0 HYPERTENSIVE HEART AND CHRONIC KIDNEY DISEASE WITH HEART FAILURE AND STAGE 1 THROUGH STAGE 4 CHRONIC KIDNEY DISEASE, OR UNSPECIFIED CHRONIC KIDNEY DISEASE (H): ICD-10-CM

## 2025-08-06 DIAGNOSIS — Z11.59 ENCOUNTER FOR SCREENING FOR OTHER VIRAL DISEASES: ICD-10-CM

## 2025-08-06 DIAGNOSIS — Z23 NEED FOR VACCINATION: ICD-10-CM

## 2025-08-06 DIAGNOSIS — I10 ESSENTIAL HYPERTENSION: ICD-10-CM

## 2025-08-06 DIAGNOSIS — N18.31 STAGE 3A CHRONIC KIDNEY DISEASE (H): ICD-10-CM

## 2025-08-06 DIAGNOSIS — R63.4 WEIGHT LOSS: ICD-10-CM

## 2025-08-06 DIAGNOSIS — M54.50 ACUTE BILATERAL LOW BACK PAIN WITHOUT SCIATICA: ICD-10-CM

## 2025-08-06 DIAGNOSIS — H17.9 CORNEAL OPACITY OF LEFT EYE: ICD-10-CM

## 2025-08-06 DIAGNOSIS — Z00.00 HEALTHCARE MAINTENANCE: Primary | ICD-10-CM

## 2025-08-06 DIAGNOSIS — N18.32 CHRONIC KIDNEY DISEASE, STAGE 3B (H): ICD-10-CM

## 2025-08-06 LAB
ALBUMIN UR-MCNC: NEGATIVE MG/DL
APPEARANCE UR: CLEAR
BACTERIA #/AREA URNS HPF: ABNORMAL /HPF
BILIRUB UR QL STRIP: NEGATIVE
COLOR UR AUTO: YELLOW
CRP SERPL-MCNC: <3 MG/L
ERYTHROCYTE [DISTWIDTH] IN BLOOD BY AUTOMATED COUNT: 12.4 % (ref 10–15)
ERYTHROCYTE [SEDIMENTATION RATE] IN BLOOD BY WESTERGREN METHOD: 9 MM/HR (ref 0–20)
GLUCOSE UR STRIP-MCNC: NEGATIVE MG/DL
HCT VFR BLD AUTO: 46 % (ref 40–53)
HCV AB SERPL QL IA: REACTIVE
HGB BLD-MCNC: 14.9 G/DL (ref 13.3–17.7)
HGB UR QL STRIP: NEGATIVE
HIV 1+2 AB+HIV1 P24 AG SERPL QL IA: NONREACTIVE
KETONES UR STRIP-MCNC: NEGATIVE MG/DL
LEUKOCYTE ESTERASE UR QL STRIP: ABNORMAL
MCH RBC QN AUTO: 29.7 PG (ref 26.5–33)
MCHC RBC AUTO-ENTMCNC: 32.4 G/DL (ref 31.5–36.5)
MCV RBC AUTO: 92 FL (ref 78–100)
NITRATE UR QL: NEGATIVE
PH UR STRIP: 5.5 [PH] (ref 5–8)
PLATELET # BLD AUTO: 180 10E3/UL (ref 150–450)
RBC # BLD AUTO: 5.02 10E6/UL (ref 4.4–5.9)
RBC #/AREA URNS AUTO: ABNORMAL /HPF
SP GR UR STRIP: 1.01 (ref 1–1.03)
SQUAMOUS #/AREA URNS AUTO: ABNORMAL /LPF
UROBILINOGEN UR STRIP-ACNC: 0.2 E.U./DL
WBC # BLD AUTO: 6.3 10E3/UL (ref 4–11)
WBC #/AREA URNS AUTO: ABNORMAL /HPF

## 2025-08-06 PROCEDURE — 3078F DIAST BP <80 MM HG: CPT | Performed by: FAMILY MEDICINE

## 2025-08-06 PROCEDURE — 87389 HIV-1 AG W/HIV-1&-2 AB AG IA: CPT | Performed by: FAMILY MEDICINE

## 2025-08-06 PROCEDURE — 87522 HEPATITIS C REVRS TRNSCRPJ: CPT | Performed by: FAMILY MEDICINE

## 2025-08-06 PROCEDURE — 82043 UR ALBUMIN QUANTITATIVE: CPT | Performed by: FAMILY MEDICINE

## 2025-08-06 PROCEDURE — 85027 COMPLETE CBC AUTOMATED: CPT | Performed by: FAMILY MEDICINE

## 2025-08-06 PROCEDURE — 3074F SYST BP LT 130 MM HG: CPT | Performed by: FAMILY MEDICINE

## 2025-08-06 PROCEDURE — G0438 PPPS, INITIAL VISIT: HCPCS | Performed by: FAMILY MEDICINE

## 2025-08-06 PROCEDURE — 85652 RBC SED RATE AUTOMATED: CPT | Performed by: FAMILY MEDICINE

## 2025-08-06 PROCEDURE — 82570 ASSAY OF URINE CREATININE: CPT | Performed by: FAMILY MEDICINE

## 2025-08-06 PROCEDURE — 86140 C-REACTIVE PROTEIN: CPT | Performed by: FAMILY MEDICINE

## 2025-08-06 PROCEDURE — 81001 URINALYSIS AUTO W/SCOPE: CPT | Performed by: FAMILY MEDICINE

## 2025-08-06 PROCEDURE — 86803 HEPATITIS C AB TEST: CPT | Performed by: FAMILY MEDICINE

## 2025-08-06 PROCEDURE — 36415 COLL VENOUS BLD VENIPUNCTURE: CPT | Performed by: FAMILY MEDICINE

## 2025-08-06 PROCEDURE — 99214 OFFICE O/P EST MOD 30 MIN: CPT | Mod: 25 | Performed by: FAMILY MEDICINE

## 2025-08-06 RX ORDER — CLONIDINE HYDROCHLORIDE 0.1 MG/1
0.1 TABLET ORAL 2 TIMES DAILY
Qty: 360 TABLET | Refills: 3 | Status: SHIPPED | OUTPATIENT
Start: 2025-08-06

## 2025-08-06 SDOH — HEALTH STABILITY: PHYSICAL HEALTH: ON AVERAGE, HOW MANY MINUTES DO YOU ENGAGE IN EXERCISE AT THIS LEVEL?: 10 MIN

## 2025-08-06 SDOH — HEALTH STABILITY: PHYSICAL HEALTH: ON AVERAGE, HOW MANY DAYS PER WEEK DO YOU ENGAGE IN MODERATE TO STRENUOUS EXERCISE (LIKE A BRISK WALK)?: 0 DAYS

## 2025-08-06 ASSESSMENT — ENCOUNTER SYMPTOMS: BACK PAIN: 1

## 2025-08-06 ASSESSMENT — SOCIAL DETERMINANTS OF HEALTH (SDOH): HOW OFTEN DO YOU GET TOGETHER WITH FRIENDS OR RELATIVES?: PATIENT DECLINED

## 2025-08-07 LAB
CREAT UR-MCNC: 95.4 MG/DL
HCV RNA SERPL NAA+PROBE-ACNC: NOT DETECTED IU/ML
MICROALBUMIN UR-MCNC: 18.9 MG/L
MICROALBUMIN/CREAT UR: 19.81 MG/G CR (ref 0–17)

## 2025-08-09 ENCOUNTER — MYC MEDICAL ADVICE (OUTPATIENT)
Dept: FAMILY MEDICINE | Facility: CLINIC | Age: 65
End: 2025-08-09
Payer: MEDICARE

## 2025-08-11 ENCOUNTER — RESULTS FOLLOW-UP (OUTPATIENT)
Dept: FAMILY MEDICINE | Facility: CLINIC | Age: 65
End: 2025-08-11
Payer: MEDICARE

## 2025-08-11 DIAGNOSIS — R63.4 WEIGHT LOSS: Primary | ICD-10-CM

## 2025-08-19 ENCOUNTER — HOSPITAL ENCOUNTER (OUTPATIENT)
Dept: ULTRASOUND IMAGING | Facility: HOSPITAL | Age: 65
Discharge: HOME | End: 2025-08-19
Attending: FAMILY MEDICINE
Payer: MEDICARE

## 2025-08-19 ENCOUNTER — RESULTS FOLLOW-UP (OUTPATIENT)
Dept: FAMILY MEDICINE | Facility: CLINIC | Age: 65
End: 2025-08-19
Payer: MEDICARE

## 2025-08-19 DIAGNOSIS — R63.4 WEIGHT LOSS: ICD-10-CM

## 2025-08-19 PROCEDURE — 76700 US EXAM ABDOM COMPLETE: CPT

## 2025-08-25 ENCOUNTER — VIRTUAL VISIT (OUTPATIENT)
Dept: ENDOCRINOLOGY | Facility: CLINIC | Age: 65
End: 2025-08-25
Payer: MEDICARE

## 2025-08-25 ENCOUNTER — TELEPHONE (OUTPATIENT)
Dept: ENDOCRINOLOGY | Facility: CLINIC | Age: 65
End: 2025-08-25

## 2025-08-25 DIAGNOSIS — Z86.39 HISTORY OF HYPOKALEMIA: ICD-10-CM

## 2025-08-25 DIAGNOSIS — I10 HYPERTENSION, UNSPECIFIED TYPE: ICD-10-CM

## 2025-08-25 DIAGNOSIS — E26.09 PRIMARY ALDOSTERONISM: Primary | ICD-10-CM

## 2025-08-25 DIAGNOSIS — N18.32 STAGE 3B CHRONIC KIDNEY DISEASE (H): ICD-10-CM

## 2025-08-25 PROCEDURE — 98007 SYNCH AUDIO-VIDEO EST HI 40: CPT | Performed by: INTERNAL MEDICINE

## 2025-08-25 PROCEDURE — G2211 COMPLEX E/M VISIT ADD ON: HCPCS | Performed by: INTERNAL MEDICINE

## 2025-08-25 PROCEDURE — 1126F AMNT PAIN NOTED NONE PRSNT: CPT | Mod: 95 | Performed by: INTERNAL MEDICINE

## 2025-08-25 ASSESSMENT — PAIN SCALES - GENERAL: PAINLEVEL_OUTOF10: NO PAIN (0)

## 2025-09-05 ENCOUNTER — LAB (OUTPATIENT)
Dept: LAB | Facility: CLINIC | Age: 65
End: 2025-09-05
Payer: MEDICARE

## 2025-09-05 DIAGNOSIS — N18.32 CHRONIC KIDNEY DISEASE, STAGE 3B (H): Primary | ICD-10-CM

## (undated) DEVICE — EYE SOL BSS 500ML BAG 0065-1795-04

## (undated) DEVICE — SU ETHILON 8-0 TG175-8 12" 1716G

## (undated) DEVICE — EYE SHIELD PLASTIC CLEAR UNIVERSAL K9-6050

## (undated) DEVICE — EYE CANN IRR 30GA  ANTERIOR CHAMBER 581273

## (undated) DEVICE — TUBING SUCTION MEDI-VAC SOFT 3/16"X20' N520A

## (undated) DEVICE — ESU CORD BIPOLAR GREEN 10-4000

## (undated) DEVICE — SPONGE SPEAR WECK CEL 6/PKG 0008680

## (undated) DEVICE — EYE PROBE LASER 25GA FLEX RFID 8065751114

## (undated) DEVICE — NDL 23GA 1" 305145

## (undated) DEVICE — DRAPE INCISE 51X51" 1060

## (undated) DEVICE — BLADE KNIFE BEAVER MICROSHARP GREEN 377515

## (undated) DEVICE — EYE PACK 25GA CONSTELLATION 10,000 CPM PPK9380-04

## (undated) DEVICE — SYR 03ML LL W/O NDL 309657

## (undated) DEVICE — MARKING PEN ULTRA-FINE WITH RULER 1436SR-100

## (undated) DEVICE — CONTAINER SPECIMEN 4OZ STERILE 17099

## (undated) DEVICE — EYE SHIELD OPHTHALMIC 8MM VISITEC 581062

## (undated) DEVICE — SYR 05ML LL W/O NDL

## (undated) DEVICE — LINEN TOWEL PACK X5 5464

## (undated) DEVICE — SYR 01ML LL W/O NDL LATEX FREE 309628

## (undated) DEVICE — SU VICRYL 8-0 BV130-5 5" J401G

## (undated) DEVICE — TAPE TRANSPORE 1"X1.5YD 1534S-1

## (undated) DEVICE — POSITIONER ARMBOARD FOAM 1PAIR LF FP-ARMB1

## (undated) DEVICE — APPLICATORS COTTON TIP 6"X2 STERILE LF C15053-006

## (undated) DEVICE — EYE CANN IRRIG CORTICAL CLVNG HYDRDISCTR 27GAX7/8" BC585158

## (undated) DEVICE — SOL NACL 0.9% 10ML VIAL 0409-4888-02

## (undated) DEVICE — SU ETHILON 10-0 CS160-6 12" 9000G

## (undated) DEVICE — EYE TIP IRRIGATION & ASPIRATION POLYMER CVD 0.3MM 8065751512

## (undated) DEVICE — GLOVE BIOGEL PI MICRO SZ 7.5 48575

## (undated) DEVICE — TAPE DURAPORE 3" SILK 1538-3

## (undated) DEVICE — EYE PUNCH VACUUM BARRON 7.5MM K20-2106

## (undated) DEVICE — EYE PACK CUSTOM ANTERIOR 30DEG TIP CENTURION PPK6682-04

## (undated) DEVICE — STRAP KNEE/BODY 31143004

## (undated) DEVICE — ATTENTION CASE CART PLEASE PICK

## (undated) DEVICE — EYE MARKING PAD 581057

## (undated) DEVICE — Device

## (undated) DEVICE — EYE CANN IRR 25GA CYSTOTOME 581610

## (undated) DEVICE — PACK CATARACT UMMC

## (undated) DEVICE — DRSG EYE PAD STERILE 1.63X2.63" NON21600

## (undated) DEVICE — EYE CANN SOFT TIP 25GA FOR VALVED SET 8065149530

## (undated) DEVICE — EYE KNIFE MVR 20GA .9MM STR 376630

## (undated) DEVICE — ESU HOLSTER PLASTIC DISP E2400

## (undated) DEVICE — SU PLAIN 6-0 TG140-8 18" 1735G

## (undated) DEVICE — APPLICATORS COTTON-TIPPED 3" PKG OF 2 C15050-003

## (undated) DEVICE — EYE CANN IRR 27GA ANTERIOR CHAMBER 581280

## (undated) DEVICE — SU ETHILON 10-0 TG-160-6 DA 12" 7756G

## (undated) DEVICE — SU VICRYL 7-0 TG140-8DA 18" J546G

## (undated) DEVICE — EYE CANN IRR 20GA ACM LEWICKY 585061

## (undated) DEVICE — PEN MARKING SKIN VISIMARK 1424SR

## (undated) DEVICE — GLOVE SENSICARE 7.0 MSG1070 LATEX FREE

## (undated) DEVICE — SU ETHILON 10-0 BV75-3 5" 2870

## (undated) DEVICE — EYE DRAPE MICROSCOPE UNIVERSAL (BIOM FULL) 08-MK55140

## (undated) DEVICE — EYE KNIFE STILETTO VISITEC 1.1MM ANG 45DEG SIDEPORT 376620

## (undated) DEVICE — DRAPE IOBAN INCISE 13X13" 6640EZ

## (undated) DEVICE — NDL 30GA 0.5" 305106

## (undated) DEVICE — EYE KNIFE SLIT XSTAR VISITEC 2.8MM 45DEG 373728

## (undated) DEVICE — EYE DRAPE PANEL/UTILITY DRAPE CENTURION 8065103820

## (undated) DEVICE — EYE PREP BETADINE 5% SOLUTION 30ML 0065-0411-30

## (undated) DEVICE — NDL 18GA 1.5" 305196

## (undated) DEVICE — SYR 10ML LL W/O NDL 302995

## (undated) DEVICE — EYE PROBE ESU DIATHERMY DSP 25GA 339.21

## (undated) DEVICE — EYE SHIELD OPHTHALMIC 8MM MEROCEL 400106

## (undated) DEVICE — EYE LENS CARTRIDGE D ALCON MONARCH

## (undated) DEVICE — EYE HANDPIECE 23GA VITRECTOMY CENTURION 8065752134

## (undated) DEVICE — SU ETHILON 9-0 CS160-6DA 12" 9011G

## (undated) DEVICE — EYE TIP BIPOLAR 18GA ERASER 221250

## (undated) DEVICE — PACK VITRECTOMY/RET CUSTOM ASC PQ15VRU12

## (undated) DEVICE — EYE DRAPE POUCH 9562

## (undated) DEVICE — EYE BLADE 19GA IOL CUTTER

## (undated) DEVICE — GLOVE BIOGEL PI MICRO SZ 7.0 48570

## (undated) DEVICE — EYE STRIP FLUORESCEIN TEST 1MG BIO-GLO HUO900-11

## (undated) RX ORDER — OXYCODONE HYDROCHLORIDE 5 MG/1
TABLET ORAL
Status: DISPENSED
Start: 2023-12-18

## (undated) RX ORDER — ONDANSETRON 2 MG/ML
INJECTION INTRAMUSCULAR; INTRAVENOUS
Status: DISPENSED
Start: 2023-12-18

## (undated) RX ORDER — FENTANYL CITRATE 50 UG/ML
INJECTION, SOLUTION INTRAMUSCULAR; INTRAVENOUS
Status: DISPENSED
Start: 2023-12-18

## (undated) RX ORDER — PROPOFOL 10 MG/ML
INJECTION, EMULSION INTRAVENOUS
Status: DISPENSED
Start: 2024-04-29

## (undated) RX ORDER — PROPOFOL 10 MG/ML
INJECTION, EMULSION INTRAVENOUS
Status: DISPENSED
Start: 2024-11-11

## (undated) RX ORDER — METOPROLOL TARTRATE 1 MG/ML
INJECTION, SOLUTION INTRAVENOUS
Status: DISPENSED
Start: 2023-12-18

## (undated) RX ORDER — HYDROMORPHONE HYDROCHLORIDE 1 MG/ML
INJECTION, SOLUTION INTRAMUSCULAR; INTRAVENOUS; SUBCUTANEOUS
Status: DISPENSED
Start: 2024-11-11

## (undated) RX ORDER — LABETALOL HYDROCHLORIDE 5 MG/ML
INJECTION, SOLUTION INTRAVENOUS
Status: DISPENSED
Start: 2023-12-18

## (undated) RX ORDER — ONDANSETRON 2 MG/ML
INJECTION INTRAMUSCULAR; INTRAVENOUS
Status: DISPENSED
Start: 2024-04-29

## (undated) RX ORDER — DEXAMETHASONE SODIUM PHOSPHATE 4 MG/ML
INJECTION, SOLUTION INTRA-ARTICULAR; INTRALESIONAL; INTRAMUSCULAR; INTRAVENOUS; SOFT TISSUE
Status: DISPENSED
Start: 2024-11-11

## (undated) RX ORDER — PROPARACAINE HYDROCHLORIDE 5 MG/ML
SOLUTION/ DROPS OPHTHALMIC
Status: DISPENSED
Start: 2023-12-18

## (undated) RX ORDER — EPHEDRINE SULFATE 50 MG/ML
INJECTION, SOLUTION INTRAMUSCULAR; INTRAVENOUS; SUBCUTANEOUS
Status: DISPENSED
Start: 2023-12-18

## (undated) RX ORDER — FENTANYL CITRATE 50 UG/ML
INJECTION, SOLUTION INTRAMUSCULAR; INTRAVENOUS
Status: DISPENSED
Start: 2024-04-29

## (undated) RX ORDER — FENTANYL CITRATE-0.9 % NACL/PF 10 MCG/ML
PLASTIC BAG, INJECTION (ML) INTRAVENOUS
Status: DISPENSED
Start: 2024-11-11

## (undated) RX ORDER — FENTANYL CITRATE 50 UG/ML
INJECTION, SOLUTION INTRAMUSCULAR; INTRAVENOUS
Status: DISPENSED
Start: 2024-11-11

## (undated) RX ORDER — ONDANSETRON 2 MG/ML
INJECTION INTRAMUSCULAR; INTRAVENOUS
Status: DISPENSED
Start: 2024-11-11

## (undated) RX ORDER — CYCLOPENTOLAT/TROPIC/PHENYLEPH 1%-1%-2.5%
DROPS (EA) OPHTHALMIC (EYE)
Status: DISPENSED
Start: 2024-11-11

## (undated) RX ORDER — GLYCOPYRROLATE 0.2 MG/ML
INJECTION, SOLUTION INTRAMUSCULAR; INTRAVENOUS
Status: DISPENSED
Start: 2024-11-11

## (undated) RX ORDER — CYCLOPENTOLAT/TROPIC/PHENYLEPH 1%-1%-2.5%
DROPS (EA) OPHTHALMIC (EYE)
Status: DISPENSED
Start: 2024-04-29

## (undated) RX ORDER — DEXAMETHASONE SODIUM PHOSPHATE 4 MG/ML
INJECTION, SOLUTION INTRA-ARTICULAR; INTRALESIONAL; INTRAMUSCULAR; INTRAVENOUS; SOFT TISSUE
Status: DISPENSED
Start: 2024-04-29

## (undated) RX ORDER — CYCLOPENTOLAT/TROPIC/PHENYLEPH 1%-1%-2.5%
DROPS (EA) OPHTHALMIC (EYE)
Status: DISPENSED
Start: 2023-12-18

## (undated) RX ORDER — PROPARACAINE HYDROCHLORIDE 5 MG/ML
SOLUTION/ DROPS OPHTHALMIC
Status: DISPENSED
Start: 2024-04-29

## (undated) RX ORDER — PROPARACAINE HYDROCHLORIDE 5 MG/ML
SOLUTION/ DROPS OPHTHALMIC
Status: DISPENSED
Start: 2024-11-11